# Patient Record
Sex: MALE | Race: WHITE
[De-identification: names, ages, dates, MRNs, and addresses within clinical notes are randomized per-mention and may not be internally consistent; named-entity substitution may affect disease eponyms.]

---

## 2021-09-03 ENCOUNTER — HOSPITAL ENCOUNTER (INPATIENT)
Dept: HOSPITAL 47 - EC | Age: 61
LOS: 6 days | Discharge: HOME HEALTH | DRG: 377 | End: 2021-09-09
Attending: HOSPITALIST | Admitting: HOSPITALIST
Payer: MEDICARE

## 2021-09-03 DIAGNOSIS — Z83.3: ICD-10-CM

## 2021-09-03 DIAGNOSIS — K31.819: ICD-10-CM

## 2021-09-03 DIAGNOSIS — Z86.14: ICD-10-CM

## 2021-09-03 DIAGNOSIS — J18.9: ICD-10-CM

## 2021-09-03 DIAGNOSIS — F17.210: ICD-10-CM

## 2021-09-03 DIAGNOSIS — I50.32: ICD-10-CM

## 2021-09-03 DIAGNOSIS — K22.10: ICD-10-CM

## 2021-09-03 DIAGNOSIS — I87.8: ICD-10-CM

## 2021-09-03 DIAGNOSIS — Z82.49: ICD-10-CM

## 2021-09-03 DIAGNOSIS — E87.2: ICD-10-CM

## 2021-09-03 DIAGNOSIS — K44.9: ICD-10-CM

## 2021-09-03 DIAGNOSIS — Z79.51: ICD-10-CM

## 2021-09-03 DIAGNOSIS — K42.9: ICD-10-CM

## 2021-09-03 DIAGNOSIS — L03.115: ICD-10-CM

## 2021-09-03 DIAGNOSIS — J44.0: ICD-10-CM

## 2021-09-03 DIAGNOSIS — K57.31: Primary | ICD-10-CM

## 2021-09-03 DIAGNOSIS — Z80.9: ICD-10-CM

## 2021-09-03 DIAGNOSIS — M19.012: ICD-10-CM

## 2021-09-03 DIAGNOSIS — K21.9: ICD-10-CM

## 2021-09-03 DIAGNOSIS — E87.1: ICD-10-CM

## 2021-09-03 DIAGNOSIS — D62: ICD-10-CM

## 2021-09-03 DIAGNOSIS — K80.20: ICD-10-CM

## 2021-09-03 DIAGNOSIS — J44.1: ICD-10-CM

## 2021-09-03 DIAGNOSIS — K64.0: ICD-10-CM

## 2021-09-03 DIAGNOSIS — D12.5: ICD-10-CM

## 2021-09-03 DIAGNOSIS — G89.29: ICD-10-CM

## 2021-09-03 DIAGNOSIS — E11.9: ICD-10-CM

## 2021-09-03 DIAGNOSIS — J96.01: ICD-10-CM

## 2021-09-03 DIAGNOSIS — E66.01: ICD-10-CM

## 2021-09-03 DIAGNOSIS — R00.0: ICD-10-CM

## 2021-09-03 DIAGNOSIS — Z79.899: ICD-10-CM

## 2021-09-03 LAB
ALBUMIN SERPL-MCNC: 3.5 G/DL (ref 3.5–5)
ALP SERPL-CCNC: 92 U/L (ref 38–126)
ALT SERPL-CCNC: 17 U/L (ref 4–49)
ANION GAP SERPL CALC-SCNC: 9 MMOL/L
APTT BLD: 25.7 SEC (ref 22–30)
AST SERPL-CCNC: 23 U/L (ref 17–59)
BASOPHILS # BLD AUTO: 0.1 K/UL (ref 0–0.2)
BASOPHILS NFR BLD AUTO: 0 %
BUN SERPL-SCNC: 30 MG/DL (ref 9–20)
CALCIUM SPEC-MCNC: 8.3 MG/DL (ref 8.4–10.2)
CHLORIDE SERPL-SCNC: 87 MMOL/L (ref 98–107)
CO2 SERPL-SCNC: 33 MMOL/L (ref 22–30)
EOSINOPHIL # BLD AUTO: 0 K/UL (ref 0–0.7)
EOSINOPHIL NFR BLD AUTO: 0 %
ERYTHROCYTE [DISTWIDTH] IN BLOOD BY AUTOMATED COUNT: 4.65 M/UL (ref 4.3–5.9)
ERYTHROCYTE [DISTWIDTH] IN BLOOD: 14.2 % (ref 11.5–15.5)
GLUCOSE SERPL-MCNC: 262 MG/DL (ref 74–99)
HCT VFR BLD AUTO: 43.9 % (ref 39–53)
HGB BLD-MCNC: 14.5 GM/DL (ref 13–17.5)
INR PPP: 1 (ref ?–1.2)
LIPASE SERPL-CCNC: 79 U/L (ref 23–300)
LYMPHOCYTES # SPEC AUTO: 1 K/UL (ref 1–4.8)
LYMPHOCYTES NFR SPEC AUTO: 8 %
MAGNESIUM SPEC-SCNC: 1.4 MG/DL (ref 1.6–2.3)
MCH RBC QN AUTO: 31.3 PG (ref 25–35)
MCHC RBC AUTO-ENTMCNC: 33.1 G/DL (ref 31–37)
MCV RBC AUTO: 94.3 FL (ref 80–100)
MONOCYTES # BLD AUTO: 0.5 K/UL (ref 0–1)
MONOCYTES NFR BLD AUTO: 4 %
NEUTROPHILS # BLD AUTO: 10.8 K/UL (ref 1.3–7.7)
NEUTROPHILS NFR BLD AUTO: 86 %
PLATELET # BLD AUTO: 459 K/UL (ref 150–450)
POTASSIUM SERPL-SCNC: 4.1 MMOL/L (ref 3.5–5.1)
PROT SERPL-MCNC: 7.2 G/DL (ref 6.3–8.2)
PT BLD: 10.7 SEC (ref 9–12)
SODIUM SERPL-SCNC: 129 MMOL/L (ref 137–145)
WBC # BLD AUTO: 12.6 K/UL (ref 3.8–10.6)

## 2021-09-03 PROCEDURE — 84484 ASSAY OF TROPONIN QUANT: CPT

## 2021-09-03 PROCEDURE — 93005 ELECTROCARDIOGRAM TRACING: CPT

## 2021-09-03 PROCEDURE — 74177 CT ABD & PELVIS W/CONTRAST: CPT

## 2021-09-03 PROCEDURE — 85025 COMPLETE CBC W/AUTO DIFF WBC: CPT

## 2021-09-03 PROCEDURE — 83690 ASSAY OF LIPASE: CPT

## 2021-09-03 PROCEDURE — 99285 EMERGENCY DEPT VISIT HI MDM: CPT

## 2021-09-03 PROCEDURE — 71046 X-RAY EXAM CHEST 2 VIEWS: CPT

## 2021-09-03 PROCEDURE — 88305 TISSUE EXAM BY PATHOLOGIST: CPT

## 2021-09-03 PROCEDURE — 87040 BLOOD CULTURE FOR BACTERIA: CPT

## 2021-09-03 PROCEDURE — 85730 THROMBOPLASTIN TIME PARTIAL: CPT

## 2021-09-03 PROCEDURE — 80053 COMPREHEN METABOLIC PANEL: CPT

## 2021-09-03 PROCEDURE — 83880 ASSAY OF NATRIURETIC PEPTIDE: CPT

## 2021-09-03 PROCEDURE — 45385 COLONOSCOPY W/LESION REMOVAL: CPT

## 2021-09-03 PROCEDURE — 94760 N-INVAS EAR/PLS OXIMETRY 1: CPT

## 2021-09-03 PROCEDURE — 85027 COMPLETE CBC AUTOMATED: CPT

## 2021-09-03 PROCEDURE — 43235 EGD DIAGNOSTIC BRUSH WASH: CPT

## 2021-09-03 PROCEDURE — 94640 AIRWAY INHALATION TREATMENT: CPT

## 2021-09-03 PROCEDURE — 80048 BASIC METABOLIC PNL TOTAL CA: CPT

## 2021-09-03 PROCEDURE — 83605 ASSAY OF LACTIC ACID: CPT

## 2021-09-03 PROCEDURE — 85610 PROTHROMBIN TIME: CPT

## 2021-09-03 PROCEDURE — 36415 COLL VENOUS BLD VENIPUNCTURE: CPT

## 2021-09-03 PROCEDURE — 83735 ASSAY OF MAGNESIUM: CPT

## 2021-09-03 PROCEDURE — 71045 X-RAY EXAM CHEST 1 VIEW: CPT

## 2021-09-03 PROCEDURE — 83036 HEMOGLOBIN GLYCOSYLATED A1C: CPT

## 2021-09-03 PROCEDURE — 82272 OCCULT BLD FECES 1-3 TESTS: CPT

## 2021-09-03 PROCEDURE — 99213 OFFICE O/P EST LOW 20 MIN: CPT

## 2021-09-03 RX ADMIN — MAGNESIUM SULFATE IN DEXTROSE SCH MLS/HR: 10 INJECTION, SOLUTION INTRAVENOUS at 23:43

## 2021-09-03 RX ADMIN — MAGNESIUM SULFATE IN DEXTROSE SCH MLS/HR: 10 INJECTION, SOLUTION INTRAVENOUS at 21:41

## 2021-09-03 NOTE — XR
EXAMINATION TYPE: XR chest 2V

 

DATE OF EXAM: 9/3/2021

 

COMPARISON: 11/5/2013

 

HISTORY: Short of breath

 

TECHNIQUE:

 

FINDINGS: There is a 3 cm patch of infiltrate in the right lower lobe. There is also some infiltrate 
in the lateral left upper lobe. Heart and mediastinum are normal. There are no hilar masses. There is
 no pleural effusion. Bony thorax is intact. There are chest leads.

 

IMPRESSION: There is evidence for some mild pneumonia right lower lobe and left upper lobe that is a 
change compared to old exam. Normal heart. No heart failure.

## 2021-09-03 NOTE — ED
GI Bleed HPI





- General


Chief complaint: GI Bleed


Stated complaint: GI Bleed


Time Seen by Provider: 09/03/21 18:05


Source: patient, EMS


Mode of arrival: EMS


Limitations: no limitations





- History of Present Illness


Initial comments: 





60-year-old male past medical history of heart failure, COPD, diabetes presents 

emergency department with reported bright red blood per rectum.  Patient states 

that since 11 AM he has had 4 episodes of bright red blood.  Denies previous 

history of GI bleeds.  No peptic ulcer disease.  Patient does not drink.  States

that he has been been taking Naprosyn twice daily for left shoulder pain.  

Denies alcohol use.  No fevers or chills.  Denies any abdominal pain.  No rectal

pain.  Has had an endoscopy however it was greater than 10 years ago.  No fevers

or chills.  No nausea or vomiting.  Upon evaluation of the patient in his room 

he does have notable shortness of breath.  Patient does not appear to be 

bothered by his work of breathing however he appears significantly in distress 

after moving around to complete the rectal exam. Patient states that he has a 

history of COPD and asthma.  He also has a history of heart failure.  He was 

recently placed on a new diuretic.  He denies chest pain.  No fevers, chills or 

cough.  Denies any worsening lower extremity edema. Came to the hospital today 

for wound care evaluation and felt as if he was doing well ambulating without 

shortness of breath. No alleviating, precipitating or modifying factors





- Related Data


                                Home Medications











 Medication  Instructions  Recorded  Confirmed


 


Furosemide [Lasix] 40 mg PO BID 07/19/16 09/03/21


 


Sulfamethox-Tmp 800-160Mg [Bactrim 1 tab PO Q12HR 07/19/16 09/03/21





-160 mg]   


 


Acetaminophen Tab [Tylenol Tab] 1,000 mg PO Q6HR PRN 09/03/21 09/03/21


 


Albuterol Sulfate [Ventolin HFA] 1 - 2 puff INHALATION RT-Q6H PRN 09/03/21 09/03/21


 


Naproxen 500 mg PO BID PRN 09/03/21 09/03/21


 


Oxymetazoline HCl [Afrin 0.05%] 1 spray EA NOSTRIL DAILY PRN 09/03/21 09/03/21


 


metOLazone [Zaroxolyn] 5 mg PO DAILY 09/03/21 09/03/21











                                    Allergies











Allergy/AdvReac Type Severity Reaction Status Date / Time


 


No Known Allergies Allergy   Verified 09/03/21 18:29














Review of Systems


ROS Statement: 


Those systems with pertinent positive or pertinent negative responses have been 

documented in the HPI.





ROS Other: All systems not noted in ROS Statement are negative.





Past Medical History


Past Medical History: Heart Failure, COPD, Diabetes Mellitus


History of Any Multi-Drug Resistant Organisms: MRSA


Date of last positivie culture/infection: 6/23/16


MDRO Source:: Left Foot


Past Surgical History: Hernia Repair, Orthopedic Surgery, Tonsillectomy


Additional Past Surgical History / Comment(s): lt. 5th toe amp


Past Psychological History: No Psychological Hx Reported


Smoking Status: Current every day smoker


Past Alcohol Use History: None Reported


Past Drug Use History: None Reported





General Exam


Limitations: no limitations


General appearance: alert, in no apparent distress


Head exam: Present: atraumatic, normocephalic, normal inspection


Eye exam: Present: normal appearance, PERRL, EOMI.  Absent: scleral icterus, 

conjunctival injection, periorbital swelling


ENT exam: Present: normal exam, mucous membranes moist


Respiratory exam: Present: respiratory distress, wheezes, accessory muscle use, 

decreased breath sounds


Cardiovascular Exam: Present: normal rhythm, tachycardia


GI/Abdominal exam: Present: soft, normal bowel sounds.  Absent: distended, 

tenderness, guarding, rebound, rigid


Rectal exam: Present: bloody stool.  Absent: hemorrhoids, mass, tenderness, 

prostate tenderness


Extremities exam: Present: other (b/l le erythema, brawny edema with chronic 

venous stasis)


Neurological exam: Present: alert, oriented X3, CN II-XII intact


Psychiatric exam: Present: normal affect, normal mood





Course


                                   Vital Signs











  09/03/21 09/03/21 09/03/21





  18:03 19:06 19:30


 


Temperature 98.2 F  


 


Pulse Rate 114 H 110 H 112 H


 


Respiratory 19 18 





Rate   


 


Blood Pressure 107/77 112/80 


 


O2 Sat by Pulse 98 100 





Oximetry   














  09/03/21 09/03/21 09/04/21





  19:39 21:15 01:00


 


Temperature   


 


Pulse Rate 114 H 112 H 98


 


Respiratory  18 18





Rate   


 


Blood Pressure  96/72 114/74


 


O2 Sat by Pulse  99 100





Oximetry   














  09/04/21 09/04/21 09/04/21





  02:47 07:43 07:59


 


Temperature   


 


Pulse Rate 96 100 98


 


Respiratory 18  





Rate   


 


Blood Pressure 114/77  


 


O2 Sat by Pulse 100  





Oximetry   














  09/04/21 09/04/21 09/04/21





  10:24 11:39 11:49


 


Temperature 99.0 F  


 


Pulse Rate 96 86 92


 


Respiratory 18  





Rate   


 


Blood Pressure 136/86  


 


O2 Sat by Pulse 96  





Oximetry   














Medical Decision Making





- Medical Decision Making





Upon arrival patient was placed into room 1.  Rectal exam is performed and 

demonstrates small streaks of bright red blood.  Patient hemodynamically stable.

  He is placed on 6 L of oxygen due to his increased respiratory status.  

Laboratory studies were conducted.  Hemoglobin is 14.5.  Lactic acid 2.4.  

Sodium 129.  Creatinine 1.2.  Occult is positive.  Chest x-ray does demonstrate 

mild pneumonia right lower lobe and left upper lobe.  Blood cultures obtained.  

Patient given a dose of Levaquin and Vanco.  Patient also given 80 mg Protonix. 

 Recommended admission in order to trend his troponins.  Spoke with Dr. Yarbrough 

who refused to consult on the patient. He states "I don't do GI bleeds".  

Because of this I did call and speak with Dr. Saldaña who agreed to consult on

 the patient. He will be admitted to medicine. She would like any DVT 

prophylaxis held.  Repeat CBC ordered for midnight.  DuoNeb treatments are 

ordered for every 4 hours.  Patient remained in hemodynamically stable condition

 awaiting a bed on the floor





- Lab Data


Result diagrams: 


                                 09/04/21 13:05





                                 09/04/21 02:52


                                   Lab Results











  09/03/21 09/03/21 09/03/21 Range/Units





  19:08 19:08 19:08 


 


WBC  12.6 H    (3.8-10.6)  k/uL


 


RBC  4.65    (4.30-5.90)  m/uL


 


Hgb  14.5    (13.0-17.5)  gm/dL


 


Hct  43.9    (39.0-53.0)  %


 


MCV  94.3    (80.0-100.0)  fL


 


MCH  31.3    (25.0-35.0)  pg


 


MCHC  33.1    (31.0-37.0)  g/dL


 


RDW  14.2    (11.5-15.5)  %


 


Plt Count  459 H    (150-450)  k/uL


 


MPV  7.5    


 


Neutrophils %  86    %


 


Lymphocytes %  8    %


 


Monocytes %  4    %


 


Eosinophils %  0    %


 


Basophils %  0    %


 


Neutrophils #  10.8 H    (1.3-7.7)  k/uL


 


Lymphocytes #  1.0    (1.0-4.8)  k/uL


 


Monocytes #  0.5    (0-1.0)  k/uL


 


Eosinophils #  0.0    (0-0.7)  k/uL


 


Basophils #  0.1    (0-0.2)  k/uL


 


PT    10.7  (9.0-12.0)  sec


 


INR    1.0  (<1.2)  


 


APTT    25.7  (22.0-30.0)  sec


 


Sodium     (137-145)  mmol/L


 


Potassium     (3.5-5.1)  mmol/L


 


Chloride     ()  mmol/L


 


Carbon Dioxide     (22-30)  mmol/L


 


Anion Gap     mmol/L


 


BUN     (9-20)  mg/dL


 


Creatinine     (0.66-1.25)  mg/dL


 


Est GFR (CKD-EPI)AfAm     (>60 ml/min/1.73 sqM)  


 


Est GFR (CKD-EPI)NonAf     (>60 ml/min/1.73 sqM)  


 


Glucose     (74-99)  mg/dL


 


Lactic Ac Sepsis Rflx     


 


Plasma Lactic Acid Paul     (0.7-2.0)  mmol/L


 


Calcium     (8.4-10.2)  mg/dL


 


Magnesium     (1.6-2.3)  mg/dL


 


Total Bilirubin     (0.2-1.3)  mg/dL


 


AST     (17-59)  U/L


 


ALT     (4-49)  U/L


 


Alkaline Phosphatase     ()  U/L


 


Troponin I     (0.000-0.034)  ng/mL


 


NT-Pro-B Natriuret Pep     pg/mL


 


Total Protein     (6.3-8.2)  g/dL


 


Albumin     (3.5-5.0)  g/dL


 


Lipase     ()  U/L


 


Stool Occult Blood   Positive   (Negative)  














  09/03/21 09/03/21 09/03/21 Range/Units





  19:08 19:08 19:08 


 


WBC     (3.8-10.6)  k/uL


 


RBC     (4.30-5.90)  m/uL


 


Hgb     (13.0-17.5)  gm/dL


 


Hct     (39.0-53.0)  %


 


MCV     (80.0-100.0)  fL


 


MCH     (25.0-35.0)  pg


 


MCHC     (31.0-37.0)  g/dL


 


RDW     (11.5-15.5)  %


 


Plt Count     (150-450)  k/uL


 


MPV     


 


Neutrophils %     %


 


Lymphocytes %     %


 


Monocytes %     %


 


Eosinophils %     %


 


Basophils %     %


 


Neutrophils #     (1.3-7.7)  k/uL


 


Lymphocytes #     (1.0-4.8)  k/uL


 


Monocytes #     (0-1.0)  k/uL


 


Eosinophils #     (0-0.7)  k/uL


 


Basophils #     (0-0.2)  k/uL


 


PT     (9.0-12.0)  sec


 


INR     (<1.2)  


 


APTT     (22.0-30.0)  sec


 


Sodium  129 L    (137-145)  mmol/L


 


Potassium  4.1    (3.5-5.1)  mmol/L


 


Chloride  87 L    ()  mmol/L


 


Carbon Dioxide  33 H    (22-30)  mmol/L


 


Anion Gap  9    mmol/L


 


BUN  30 H    (9-20)  mg/dL


 


Creatinine  1.24    (0.66-1.25)  mg/dL


 


Est GFR (CKD-EPI)AfAm  72    (>60 ml/min/1.73 sqM)  


 


Est GFR (CKD-EPI)NonAf  62    (>60 ml/min/1.73 sqM)  


 


Glucose  262 H    (74-99)  mg/dL


 


Lactic Ac Sepsis Rflx     


 


Plasma Lactic Acid Paul   2.4 H*   (0.7-2.0)  mmol/L


 


Calcium  8.3 L    (8.4-10.2)  mg/dL


 


Magnesium  1.4 L    (1.6-2.3)  mg/dL


 


Total Bilirubin  0.7    (0.2-1.3)  mg/dL


 


AST  23    (17-59)  U/L


 


ALT  17    (4-49)  U/L


 


Alkaline Phosphatase  92    ()  U/L


 


Troponin I    <0.012  (0.000-0.034)  ng/mL


 


NT-Pro-B Natriuret Pep     pg/mL


 


Total Protein  7.2    (6.3-8.2)  g/dL


 


Albumin  3.5    (3.5-5.0)  g/dL


 


Lipase  79    ()  U/L


 


Stool Occult Blood     (Negative)  














  09/03/21 09/03/21 Range/Units





  19:08 19:36 


 


WBC    (3.8-10.6)  k/uL


 


RBC    (4.30-5.90)  m/uL


 


Hgb    (13.0-17.5)  gm/dL


 


Hct    (39.0-53.0)  %


 


MCV    (80.0-100.0)  fL


 


MCH    (25.0-35.0)  pg


 


MCHC    (31.0-37.0)  g/dL


 


RDW    (11.5-15.5)  %


 


Plt Count    (150-450)  k/uL


 


MPV    


 


Neutrophils %    %


 


Lymphocytes %    %


 


Monocytes %    %


 


Eosinophils %    %


 


Basophils %    %


 


Neutrophils #    (1.3-7.7)  k/uL


 


Lymphocytes #    (1.0-4.8)  k/uL


 


Monocytes #    (0-1.0)  k/uL


 


Eosinophils #    (0-0.7)  k/uL


 


Basophils #    (0-0.2)  k/uL


 


PT    (9.0-12.0)  sec


 


INR    (<1.2)  


 


APTT    (22.0-30.0)  sec


 


Sodium    (137-145)  mmol/L


 


Potassium    (3.5-5.1)  mmol/L


 


Chloride    ()  mmol/L


 


Carbon Dioxide    (22-30)  mmol/L


 


Anion Gap    mmol/L


 


BUN    (9-20)  mg/dL


 


Creatinine    (0.66-1.25)  mg/dL


 


Est GFR (CKD-EPI)AfAm    (>60 ml/min/1.73 sqM)  


 


Est GFR (CKD-EPI)NonAf    (>60 ml/min/1.73 sqM)  


 


Glucose    (74-99)  mg/dL


 


Lactic Ac Sepsis Rflx   Y  


 


Plasma Lactic Acid Paul    (0.7-2.0)  mmol/L


 


Calcium    (8.4-10.2)  mg/dL


 


Magnesium    (1.6-2.3)  mg/dL


 


Total Bilirubin    (0.2-1.3)  mg/dL


 


AST    (17-59)  U/L


 


ALT    (4-49)  U/L


 


Alkaline Phosphatase    ()  U/L


 


Troponin I    (0.000-0.034)  ng/mL


 


NT-Pro-B Natriuret Pep  43   pg/mL


 


Total Protein    (6.3-8.2)  g/dL


 


Albumin    (3.5-5.0)  g/dL


 


Lipase    ()  U/L


 


Stool Occult Blood    (Negative)  














- EKG Data


EKG Comments: 





EKG demonstrates a sinus tachycardia with a ventricular rate of 112. NC interval

 152.  .  QTC of 469.  No acute ST segment elevations or depressions 

concerning for ischemic changes





Disposition


Clinical Impression: 


 Hematochezia, CAP (community acquired pneumonia)





Disposition: ADMITTED AS IP TO THIS HOSP


Condition: Stable


Is patient prescribed a controlled substance at d/c from ED?: No


Decision to Admit Reason: Admit from EC


Decision Date: 09/03/21


Decision Time: 20:59

## 2021-09-04 LAB
ANION GAP SERPL CALC-SCNC: 6 MMOL/L
BASOPHILS # BLD AUTO: 0.1 K/UL (ref 0–0.2)
BASOPHILS NFR BLD AUTO: 1 %
BUN SERPL-SCNC: 32 MG/DL (ref 9–20)
CALCIUM SPEC-MCNC: 8.4 MG/DL (ref 8.4–10.2)
CHLORIDE SERPL-SCNC: 88 MMOL/L (ref 98–107)
CO2 SERPL-SCNC: 35 MMOL/L (ref 22–30)
EOSINOPHIL # BLD AUTO: 0 K/UL (ref 0–0.7)
EOSINOPHIL NFR BLD AUTO: 0 %
ERYTHROCYTE [DISTWIDTH] IN BLOOD BY AUTOMATED COUNT: 3.75 M/UL (ref 4.3–5.9)
ERYTHROCYTE [DISTWIDTH] IN BLOOD BY AUTOMATED COUNT: 4.06 M/UL (ref 4.3–5.9)
ERYTHROCYTE [DISTWIDTH] IN BLOOD BY AUTOMATED COUNT: 4.34 M/UL (ref 4.3–5.9)
ERYTHROCYTE [DISTWIDTH] IN BLOOD: 14.2 % (ref 11.5–15.5)
ERYTHROCYTE [DISTWIDTH] IN BLOOD: 14.3 % (ref 11.5–15.5)
ERYTHROCYTE [DISTWIDTH] IN BLOOD: 14.3 % (ref 11.5–15.5)
GLUCOSE SERPL-MCNC: 194 MG/DL (ref 74–99)
HCT VFR BLD AUTO: 35.7 % (ref 39–53)
HCT VFR BLD AUTO: 38.6 % (ref 39–53)
HCT VFR BLD AUTO: 41.1 % (ref 39–53)
HGB BLD-MCNC: 12 GM/DL (ref 13–17.5)
HGB BLD-MCNC: 12.8 GM/DL (ref 13–17.5)
HGB BLD-MCNC: 13.5 GM/DL (ref 13–17.5)
LYMPHOCYTES # SPEC AUTO: 1.7 K/UL (ref 1–4.8)
LYMPHOCYTES NFR SPEC AUTO: 16 %
MCH RBC QN AUTO: 31 PG (ref 25–35)
MCH RBC QN AUTO: 31.4 PG (ref 25–35)
MCH RBC QN AUTO: 31.9 PG (ref 25–35)
MCHC RBC AUTO-ENTMCNC: 32.7 G/DL (ref 31–37)
MCHC RBC AUTO-ENTMCNC: 33.1 G/DL (ref 31–37)
MCHC RBC AUTO-ENTMCNC: 33.5 G/DL (ref 31–37)
MCV RBC AUTO: 94.8 FL (ref 80–100)
MCV RBC AUTO: 95 FL (ref 80–100)
MCV RBC AUTO: 95.3 FL (ref 80–100)
MONOCYTES # BLD AUTO: 0.5 K/UL (ref 0–1)
MONOCYTES NFR BLD AUTO: 5 %
NEUTROPHILS # BLD AUTO: 8.3 K/UL (ref 1.3–7.7)
NEUTROPHILS NFR BLD AUTO: 76 %
PLATELET # BLD AUTO: 357 K/UL (ref 150–450)
PLATELET # BLD AUTO: 383 K/UL (ref 150–450)
PLATELET # BLD AUTO: 411 K/UL (ref 150–450)
POTASSIUM SERPL-SCNC: 4.5 MMOL/L (ref 3.5–5.1)
SODIUM SERPL-SCNC: 129 MMOL/L (ref 137–145)
WBC # BLD AUTO: 10.9 K/UL (ref 3.8–10.6)
WBC # BLD AUTO: 10.9 K/UL (ref 3.8–10.6)
WBC # BLD AUTO: 8.5 K/UL (ref 3.8–10.6)

## 2021-09-04 RX ADMIN — IPRATROPIUM BROMIDE AND ALBUTEROL SULFATE SCH: .5; 3 SOLUTION RESPIRATORY (INHALATION) at 19:21

## 2021-09-04 RX ADMIN — IPRATROPIUM BROMIDE AND ALBUTEROL SULFATE PRN ML: .5; 3 SOLUTION RESPIRATORY (INHALATION) at 11:39

## 2021-09-04 RX ADMIN — PANTOPRAZOLE SODIUM SCH MG: 40 INJECTION, POWDER, FOR SOLUTION INTRAVENOUS at 08:48

## 2021-09-04 RX ADMIN — IPRATROPIUM BROMIDE AND ALBUTEROL SULFATE PRN ML: .5; 3 SOLUTION RESPIRATORY (INHALATION) at 07:43

## 2021-09-04 RX ADMIN — CEFAZOLIN SCH MLS/HR: 330 INJECTION, POWDER, FOR SOLUTION INTRAMUSCULAR; INTRAVENOUS at 15:07

## 2021-09-04 RX ADMIN — CEFAZOLIN SCH MLS/HR: 330 INJECTION, POWDER, FOR SOLUTION INTRAMUSCULAR; INTRAVENOUS at 02:42

## 2021-09-04 RX ADMIN — IPRATROPIUM BROMIDE AND ALBUTEROL SULFATE SCH ML: .5; 3 SOLUTION RESPIRATORY (INHALATION) at 15:17

## 2021-09-04 RX ADMIN — AZITHROMYCIN SCH MG: 500 TABLET, FILM COATED ORAL at 15:07

## 2021-09-04 RX ADMIN — PANTOPRAZOLE SODIUM SCH MG: 40 INJECTION, POWDER, FOR SOLUTION INTRAVENOUS at 22:01

## 2021-09-04 RX ADMIN — FORMOTEROL FUMARATE DIHYDRATE SCH: 20 SOLUTION RESPIRATORY (INHALATION) at 19:21

## 2021-09-04 RX ADMIN — BUDESONIDE SCH: 1 SUSPENSION RESPIRATORY (INHALATION) at 19:21

## 2021-09-04 NOTE — P.CNPUL
History of Present Illness


Consult date: 09/04/21


Requesting physician: Yumiko Aden


Reason for consult: dyspnea, abnormal CXR/CT


Chief complaint: Rectal bleeding, shortness of breath


History of present illness: 





This is a 60-year-old male patient who follows with Dr. Arora as his primary

care provider.  He has history of diabetes mellitus, diastolic congestive heart 

failure, chronic obstructive pulmonary disease, chronic and ongoing tobacco 

dependence.  He presented to the emergency room last evening with reports of 

bright red blood per rectum.  He had approximate 4 episode yesterday.  No 

previous history of GI bleed.  Denies excessive alcohol use.  His been taking 

Naprosyn twice daily for left shoulder pain.  Chest x-ray revealed some mild 

infiltrates in the right lower lobe and left upper lobe.  We're consulted for 

the same.  He is seen in the emergency room.  Currently sitting up on the 

stretcher.  Awake and alert in no acute distress.  He is requiring 5 L high flow

nasal cannula to maintain O2 saturations in the 90s.  He does have home oxygen. 

He has a nebulizer with albuterol treatments.  He had not been seen by pulmon

ologist in the past.  He has been smoking for approximately 42 years.  White 

count 8.5.  Hemoglobin 12.0.  Sodium 129.  Potassium 4.5.  Bicarb 35.  

Creatinine 1.15.  Initial lactic 2.9.  Currently 1.5.  ProBNP 43.  Stool for 

occult blood positive.  He's been initiated on vancomycin, DuoNeb inhalations, 

Protonix.  0.9 normal saline at 75 ML's per hour.





Review of Systems





REVIEW OF SYSTEMS:


CONSTITUTIONAL: Denies any recent significant weight loss or weight gain.


EYES: Denies change in vision.


EARS, NOSE, MOUTH, THROAT: Denies headaches, denies sore throat.


CARDIOVASCULAR: Denies chest pain, palpitations or syncopal episodes.


RESPIRATORY: Positive for shortness of breath, cough, congestion no hemoptysis.


GASTROINTESTINAL: Positive for bright red blood per rectum


GENITOURINARY: Denies hematuria, denies infections.


MUSKULOSKELETAL: Denies pain, denies swelling.


INTEGUMENTARY: Denies rash, denies eczema.


NEUROLOGICAL: Denies recent memory loss, no recent seizure activity. 


PSYCHIATRIC: Denies anxiety, denies depression.


HEMATOLOGIC/LYMPHATIC: Denies anemia, denies enlarged lymph nodes.








Past Medical History


Past Medical History: Heart Failure, COPD, Diabetes Mellitus


History of Any Multi-Drug Resistant Organisms: MRSA


Date of last positivie culture/infection: 6/23/16


MDRO Source:: Left Foot


Past Surgical History: Hernia Repair, Orthopedic Surgery, Tonsillectomy


Additional Past Surgical History / Comment(s): lt. 5th toe amp


Past Psychological History: No Psychological Hx Reported


Smoking Status: Current every day smoker


Past Alcohol Use History: None Reported


Past Drug Use History: None Reported





Medications and Allergies


                                Home Medications











 Medication  Instructions  Recorded  Confirmed  Type


 


Furosemide [Lasix] 40 mg PO BID 07/19/16 09/03/21 History


 


Sulfamethox-Tmp 800-160Mg [Bactrim 1 tab PO Q12HR 07/19/16 09/03/21 History





-160 mg]    


 


Acetaminophen Tab [Tylenol Tab] 1,000 mg PO Q6HR PRN 09/03/21 09/03/21 History


 


Albuterol Sulfate [Ventolin HFA] 1 - 2 puff INHALATION RT-Q6H PRN 09/03/21 09/03/21 History


 


Naproxen 500 mg PO BID PRN 09/03/21 09/03/21 History


 


Oxymetazoline HCl [Afrin 0.05%] 1 spray EA NOSTRIL DAILY PRN 09/03/21 09/03/21 

History


 


metOLazone [Zaroxolyn] 5 mg PO DAILY 09/03/21 09/03/21 History








                                    Allergies











Allergy/AdvReac Type Severity Reaction Status Date / Time


 


No Known Allergies Allergy   Verified 09/03/21 18:29














Physical Exam


Vitals: 


                                   Vital Signs











  Temp Pulse Resp BP Pulse Ox


 


 09/04/21 11:49   92   


 


 09/04/21 11:39   86   


 


 09/04/21 10:24  99.0 F  96  18  136/86  96


 


 09/04/21 07:59   98   


 


 09/04/21 07:43   100   


 


 09/04/21 02:47   96  18  114/77  100


 


 09/04/21 01:00   98  18  114/74  100


 


 09/03/21 21:15   112 H  18  96/72  99


 


 09/03/21 19:39   114 H   


 


 09/03/21 19:30   112 H   


 


 09/03/21 19:06   110 H  18  112/80  100


 


 09/03/21 18:03  98.2 F  114 H  19  107/77  98








                                Intake and Output











 09/03/21 09/04/21 09/04/21





 22:59 06:59 14:59


 


Output Total 150  


 


Balance -150  


 


Output:   


 


  Stool 150  


 


Other:   


 


  # Bowel Movements 1  


 


  Weight 145.15 kg  














GENERAL EXAM: Alert, pleasant 60-year-old gentleman, on 5 L nasal cannula, 

fairly comfortable in no apparent distress.


HEAD: Normocephalic.


EYES: Normal reaction of pupils, equal size.


NOSE: Clear with pink turbinates.


THROAT: No erythema or exudates.


NECK: No masses, no JVD.


CHEST: No chest wall deformity.


LUNGS: Equal air entry with scattered rhonchi, end expiratory wheeze, 

diminished.


CVS: S1 and S2 normal with no audible murmur, regular rhythm.


ABDOMEN: No hepatosplenomegaly, normal bowel sounds, no guarding or rigidity.


SPINE: No scoliosis or deformity


SKIN: No rashes


CENTRAL NERVOUS SYSTEM: No focal deficits, tone is normal in all 4 extremities.


EXTREMITIES: There is no peripheral edema.  No clubbing, no cyanosis.  

Peripheral pulses are intact.





Results





- Laboratory Findings


CBC and BMP: 


                                 09/04/21 13:05





                                 09/04/21 02:52


PT/INR, D-dimer











PT  10.7 sec (9.0-12.0)   09/03/21  19:08    


 


INR  1.0  (<1.2)   09/03/21  19:08    








Abnormal lab findings: 


                                  Abnormal Labs











  09/03/21 09/03/21 09/03/21





  19:08 19:08 19:08


 


WBC  12.6 H  


 


RBC   


 


Hgb   


 


Hct   


 


Plt Count  459 H  


 


Neutrophils #  10.8 H  


 


Sodium   129 L 


 


Chloride   87 L 


 


Carbon Dioxide   33 H 


 


BUN   30 H 


 


Glucose   262 H 


 


Plasma Lactic Acid Paul    2.4 H*


 


Calcium   8.3 L 


 


Magnesium   1.4 L 














  09/03/21 09/04/21 09/04/21





  22:45 02:46 02:52


 


WBC   10.9 H 


 


RBC   4.06 L 


 


Hgb   12.8 L 


 


Hct   38.6 L 


 


Plt Count   


 


Neutrophils #   


 


Sodium    129 L


 


Chloride    88 L


 


Carbon Dioxide    35 H


 


BUN    32 H


 


Glucose    194 H


 


Plasma Lactic Acid Paul  2.9 H*  


 


Calcium   


 


Magnesium   














  09/04/21 09/04/21





  02:52 13:05


 


WBC  10.9 H 


 


RBC   3.75 L


 


Hgb   12.0 L


 


Hct   35.7 L


 


Plt Count  


 


Neutrophils #  8.3 H 


 


Sodium  


 


Chloride  


 


Carbon Dioxide  


 


BUN  


 


Glucose  


 


Plasma Lactic Acid Paul  


 


Calcium  


 


Magnesium  














- Diagnostic Findings


Chest x-ray: image reviewed





Assessment and Plan


Assessment: 





1 Acute lower GI bleed with bright red blood per rectum





2 Acute hypoxemic respiratory failure secondary to an acute community-acquired 

pneumonia





3 Acute exacerbation of chronic obstructive pulmonary disease secondary to above





4 Chronic and ongoing tobacco dependence of greater than 40 years





5 History of diastolic congestive heart failure, maintained on Lasix and 

Zaroxolyn in the outpatient setting





6 Chronic left shoulder pain taking Naprosyn twice daily





7 Diabetes mellitus





Plan:





The patient was seen and evaluated by Dr. Marin


Chest x-ray and labs reviewed


Discontinue vancomycin


Initiate ceftriaxone and azithromycin


Initiate DuoNeb inhalations, Pulmicort and Perforomist inhalations, IV Solu-

Medrol


Educated regarding the importance of complete smoking cessation


Continue to need to monitor hemoglobin and rectal bleeding


Remains on IV Protonix


Titrate the FiO2 as tolerated


We will continue to follow and make further recommendations based on his 

clinical status





I, the cosigning physician, performed a history & physical examination of the 

patient. Lungs sounds with bilateral scattered rhonchi, end expiratory wheeze, 

diminished.  Maintaining good O2 saturations in the 90s on 5 L/m per nasal 

cannula.  I discussed the assessment and plan of care with my nurse 

practitioner, Sandra Bustillos. I attest to the above infiltration as dictated by her.


Time with Patient: Greater than 30

## 2021-09-04 NOTE — CT
EXAMINATION TYPE: CT abdomen pelvis w con

 

DATE OF EXAM: 9/4/2021

 

COMPARISON: None

 

HISTORY: GI Bleed

 

CT DLP: 2731.8 mGycm

Automated exposure control for dose reduction was used.

 

CONTRAST: 

Performed with IV Contrast, patient injected with 80 mL of Isovue 300.

 

 

Images obtained from the diaphragm to the floor the pelvis with oral and IV contrast.

 

Heart is normal. There is no pericardial effusion. Lung bases are clear. There is no pleural effusion
. There is minimal subsegmental atelectasis right lung base. Liver spleen stomach pancreas gallbladde
r appear intact. There are small calcified gallstones. Bile ducts are not dilated.

 

There is no adrenal mass. Kidneys show satisfactory contrast opacification. There is no hydronephrosi
s. Delayed images show very little contrast in the renal collecting systems. There is no retroperiton
eal adenopathy. Ureters are not dilated. Bladder distends smoothly. There is no inguinal hernia. Ther
e is fat containing umbilical hernia that measures 5.6 cm.

 

There is no mesenteric edema. There is no ascites or free air. There is no sign of a bowel obstructio
n. There is oral contrast material extending to the rectum. There is no sign of intestinal wall thick
ening. There is very short appendix which is not dilated. There is atherosclerotic vascular calcifica
tion.

 

Lumbar vertebra have normal alignment. There is no compression fracture. Bony pelvis is intact. Hip j
oints are intact. Sacroiliac joints are intact. There is 3.5 cm exostosis on the lateral right ilium.
 This could be osteochondroma.

 

IMPRESSION:

No evidence of renal stone or obstruction. Decreased contrast seen on the delayed images that could r
elate to some degree of renal failure.

 

No evidence of bowel obstruction. I do not see a cause for GI bleeding.

 

Umbilical hernia. Cholelithiasis.

## 2021-09-04 NOTE — P.HPIM
History of Present Illness


H&P Date: 09/04/21


Chief Complaint: Rectal bleed





60-year-old male patient who follows with Dr. Arora as his primary care 

provider.  He has history of diabetes mellitus, diastolic congestive heart 

failure, chronic obstructive pulmonary disease, chronic and ongoing tobacco 

dependence.  He presented to the emergency room last evening with reports of 

bright red blood per rectum.  He had approximate 4 episode yesterday.  No 

previous history of GI bleed.  Denies excessive alcohol use.  His been taking 

Naprosyn twice daily for left shoulder pain.  Chest x-ray revealed some mild 

infiltrates in the right lower lobe and left upper lobe.  We're consulted for 

the same.  He is seen in the emergency room.  Currently sitting up on the 

stretcher.  Awake and alert in no acute distress.  He is requiring 5 L high flow

nasal cannula to maintain O2 saturations in the 90s.  He does have home oxygen. 

He has a nebulizer with albuterol treatments.  He had not been seen by 

pulmonologist in the past.  He has been smoking for approximately 42 years.  

White count 8.5.  Hemoglobin 12.0.  Sodium 129.  Potassium 4.5.  Bicarb 35.  

Creatinine 1.15.  Initial lactic 2.9.  Currently 1.5.  ProBNP 43.  Stool for 

occult blood positive. 





Review of Systems











REVIEW OF SYSTEMS: 


CONSTITUTIONAL: No fever, no malaise, no fatigue. 


HEENT: No recent visual problems or hearing problems. Denied any sore throat. 


CARDIOVASCULAR: No chest pain, orthopnea, PND, no palpitations, no syncope. 


PULMONARY: No shortness of breath, no cough, no hemoptysis. 


GASTROINTESTINAL: No diarrhea, no nausea, no vomiting, no abdominal pain. 


NEUROLOGICAL: No headaches, no weakness, no numbness. 


HEMATOLOGICAL: Denies any bleeding or petechiae. 


GENITOURINARY: Denies any burning micturition, frequency, or urgency. 


MUSCULOSKELETAL/RHEUMATOLOGICAL: Denies any joint pain, swelling, or any muscle 

pain. 


ENDOCRINE: Denies any polyuria or polydipsia. 





The rest of the 14-point review of systems is negative.





Past Medical History


Past Medical History: Heart Failure, COPD, Diabetes Mellitus


History of Any Multi-Drug Resistant Organisms: MRSA


Date of last positivie culture/infection: 6/23/16


MDRO Source:: Left Foot


Past Surgical History: Hernia Repair, Orthopedic Surgery, Tonsillectomy


Additional Past Surgical History / Comment(s): lt. 5th toe amp


Past Psychological History: No Psychological Hx Reported


Smoking Status: Current every day smoker


Past Alcohol Use History: None Reported


Past Drug Use History: None Reported





Medications and Allergies


                                Home Medications











 Medication  Instructions  Recorded  Confirmed  Type


 


Furosemide [Lasix] 40 mg PO BID 07/19/16 09/03/21 History


 


Sulfamethox-Tmp 800-160Mg [Bactrim 1 tab PO Q12HR 07/19/16 09/03/21 History





-160 mg]    


 


Acetaminophen Tab [Tylenol Tab] 1,000 mg PO Q6HR PRN 09/03/21 09/03/21 History


 


Albuterol Sulfate [Ventolin HFA] 1 - 2 puff INHALATION RT-Q6H PRN 09/03/21 09/03/21 History


 


Naproxen 500 mg PO BID PRN 09/03/21 09/03/21 History


 


Oxymetazoline HCl [Afrin 0.05%] 1 spray EA NOSTRIL DAILY PRN 09/03/21 09/03/21 

History


 


metOLazone [Zaroxolyn] 5 mg PO DAILY 09/03/21 09/03/21 History








                                    Allergies











Allergy/AdvReac Type Severity Reaction Status Date / Time


 


No Known Allergies Allergy   Verified 09/03/21 18:29














Physical Exam


Vitals: 


                                   Vital Signs











  Temp Pulse Resp BP Pulse Ox


 


 09/04/21 11:49   92   


 


 09/04/21 11:39   86   


 


 09/04/21 10:24  99.0 F  96  18  136/86  96


 


 09/04/21 07:59   98   


 


 09/04/21 07:43   100   


 


 09/04/21 02:47   96  18  114/77  100


 


 09/04/21 01:00   98  18  114/74  100


 


 09/03/21 21:15   112 H  18  96/72  99


 


 09/03/21 19:39   114 H   


 


 09/03/21 19:30   112 H   


 


 09/03/21 19:06   110 H  18  112/80  100


 


 09/03/21 18:03  98.2 F  114 H  19  107/77  98








                                Intake and Output











 09/03/21 09/04/21 09/04/21





 22:59 06:59 14:59


 


Output Total 150  


 


Balance -150  


 


Output:   


 


  Stool 150  


 


Other:   


 


  # Bowel Movements 1  


 


  Weight 145.15 kg  














GENERAL EXAM: Alert, pleasant 60-year-old gentleman, on 5 L nasal cannula, fairl

y comfortable in no apparent distress.


HEAD: Normocephalic.


EYES: Normal reaction of pupils, equal size.


NOSE: Clear with pink turbinates.


THROAT: No erythema or exudates.


NECK: No masses, no JVD.


CHEST: No chest wall deformity.


LUNGS: Equal air entry with scattered rhonchi, end expiratory wheeze, d

iminished.


CVS: S1 and S2 normal with no audible murmur, regular rhythm.


ABDOMEN: No hepatosplenomegaly, normal bowel sounds, no guarding or rigidity.


SPINE: No scoliosis or deformity


SKIN: No rashes


CENTRAL NERVOUS SYSTEM: No focal deficits, tone is normal in all 4 extremities.


EXTREMITIES: There is no peripheral edema.  No clubbing, no cyanosis.  

Peripheral pulses are intact.





Results


CBC & Chem 7: 


                                 09/04/21 13:05





                                 09/04/21 02:52


Labs: 


                  Abnormal Lab Results - Last 24 Hours (Table)











  09/03/21 09/03/21 09/03/21 Range/Units





  19:08 19:08 19:08 


 


WBC  12.6 H    (3.8-10.6)  k/uL


 


RBC     (4.30-5.90)  m/uL


 


Hgb     (13.0-17.5)  gm/dL


 


Hct     (39.0-53.0)  %


 


Plt Count  459 H    (150-450)  k/uL


 


Neutrophils #  10.8 H    (1.3-7.7)  k/uL


 


Sodium   129 L   (137-145)  mmol/L


 


Chloride   87 L   ()  mmol/L


 


Carbon Dioxide   33 H   (22-30)  mmol/L


 


BUN   30 H   (9-20)  mg/dL


 


Glucose   262 H   (74-99)  mg/dL


 


Plasma Lactic Acid Paul    2.4 H*  (0.7-2.0)  mmol/L


 


Calcium   8.3 L   (8.4-10.2)  mg/dL


 


Magnesium   1.4 L   (1.6-2.3)  mg/dL














  09/03/21 09/04/21 09/04/21 Range/Units





  22:45 02:46 02:52 


 


WBC   10.9 H   (3.8-10.6)  k/uL


 


RBC   4.06 L   (4.30-5.90)  m/uL


 


Hgb   12.8 L   (13.0-17.5)  gm/dL


 


Hct   38.6 L   (39.0-53.0)  %


 


Plt Count     (150-450)  k/uL


 


Neutrophils #     (1.3-7.7)  k/uL


 


Sodium    129 L  (137-145)  mmol/L


 


Chloride    88 L  ()  mmol/L


 


Carbon Dioxide    35 H  (22-30)  mmol/L


 


BUN    32 H  (9-20)  mg/dL


 


Glucose    194 H  (74-99)  mg/dL


 


Plasma Lactic Acid Paul  2.9 H*    (0.7-2.0)  mmol/L


 


Calcium     (8.4-10.2)  mg/dL


 


Magnesium     (1.6-2.3)  mg/dL














  09/04/21 09/04/21 Range/Units





  02:52 13:05 


 


WBC  10.9 H   (3.8-10.6)  k/uL


 


RBC   3.75 L  (4.30-5.90)  m/uL


 


Hgb   12.0 L  (13.0-17.5)  gm/dL


 


Hct   35.7 L  (39.0-53.0)  %


 


Plt Count    (150-450)  k/uL


 


Neutrophils #  8.3 H   (1.3-7.7)  k/uL


 


Sodium    (137-145)  mmol/L


 


Chloride    ()  mmol/L


 


Carbon Dioxide    (22-30)  mmol/L


 


BUN    (9-20)  mg/dL


 


Glucose    (74-99)  mg/dL


 


Plasma Lactic Acid Paul    (0.7-2.0)  mmol/L


 


Calcium    (8.4-10.2)  mg/dL


 


Magnesium    (1.6-2.3)  mg/dL














Assessment and Plan


Assessment: 





1.  Acute lower GI bleed; patient had one episode of bright red blood per 

rectum; did have to preceding bowel movements without any bleed


- We will monitor H&H closely; Protonix 40 mg IV daily; we will plan to type 

crossmatch and transfuse if hemoglobin is less than 8.0


- Gen. surgery has been consulted; no GI service will be





2.  Acute hypoxemic respiratory failure; multifactorial; secondary to CPAP, acut

e exacerbation COPD





3.  Community-acquired pneumonia


- Patient received IV Levaquin and vancomycin in ED; pulmonary is consulted and 

have discontinued vancomycin and patient is initiated on ceftriaxone and 

azithromycin


- We will monitor CBC, CMP and pro-calcitonin; blood cultures and sputum culture

s obtained in ED





4.  Acute exacerbation COPD; continue with DuoNeb nebulizer treatments along 

with Pulmicort and Perforomist inhalations; IV Solu-Medrol


- Patient is on antibiotic therapy with Rocephin and ceftriaxone





5.  Diastolic CHF; not in exacerbation; patient remains on diuretic therapy with

 Lasix and Zaroxolyn





6.  Diabetes mellitus; monitor Accu-Cheks every before meals and at bedtime with

 insulin sliding scale





7.  Osteoarthritis; chronic left shoulder pain; patient currently taking N

aprosyn twice a day dose 





DVT prophylaxis; SCDs only due to GI bleed


CODE STATUS; full code

## 2021-09-04 NOTE — P.GSCN
History of Present Illness


Consult date: 09/04/21


History of present illness: 








CHIEF COMPLAINT: Hematochezia





HISTORY OF PRESENT ILLNESS: The patient is a 60 year old male is admitted to the

emergency room with concern for gastrointestinal bleeding.  Reports blood from 

his rectum.  Additional workup from ER included chest x-ray which demonstrated 

pneumonia.  Patient's hemoglobin has been within normal limits.  Due to patient 

concern for GI bleed including presence of occult stool positive blood, general 

surgery is consulted for further assessment.  Patient has not had prior 

colonoscopy in over 10 years.





Family is at bedside and confirms his colonoscopy was without polyps. He also 

reports isela-umbilical pain and hernia precipitating his bloody bowel movements.

He reports at least 4 toilet bowl full of blood. He denies any prior episodes.





PAST MEDICAL HISTORY: 


See list and reviewed





PAST SURGICAL HISTORY: 


See list and reviewed





MEDICATIONS: 


See list and reviewed





ALLERGIES: 


See list and reviewed





SOCIAL HISTORY: See list and reviewed





FAMILY HISTORY:  See list and reviewed





REVIEW OF ORGAN SYSTEMS:


CONSTITUTIONAL:  No fevers or chills.  Has morbid obesity, BMI 41.1.


EYES: Denies any trouble with vision. No glasses.


HEENT:  No difficulties with hearing. No nosebleeds.  No difficulty swallowing. 


RESPIRATORY:  Current pneumonia.  Has troubles with breathing or dyspnea on 

exertion.  Has chronic obstructive pulmonary disease.  Chronic tobacco abuse.


CARDIOVASCULAR: Has congestive heart failure on Zaroxolyn.


GASTROINTESTINAL: Denies fatty food intolerance.  Denies change in bowel habits 

and gas bloat.  


GENITOURINARY:  Denies any blood in urine or increased urinary frequency.  


NEUROLOGICAL:  Denies any numbness or tingling along the distal extremities. No 

seizure disorders or headaches.


MUSCULOSKELETAL:  Has  back pain, stiffness or joint arthritis. 


SKIN: No current skin cancer. No rash.


PSYCHIATRIC:  Denies current depression or suicidal thoughts.


ENDOCRINE:  Denies current thyroid disorders.   Has diabetes type 2.


HEME/LYMPHATIC: Denies any lumps and bumps around the neck. No recent deep 

venous thrombosis.


ALLERGY/IMMUNOLOGY:  No immunoglobulin therapy. No immune deficiencies.


BREAST: Denies current breast lumps, pain or nipple discharge.





PHYSICAL EXAM:


VITALS: Reviewed


CONSTITUTIONAL:  Well developed and in no acute distress. 


EYES:  Conjuctivae without sclera icterus.  Extraocular movements grossly 

intact. 


HEAD, EARS, NOSE, THROAT: Moist buccal mucosa. Head is atraumatic, 

normocephalic. Hears conversational speech. No nasal drainage. 


NECK:  Supple. No JV distention. No thyroidomegaly. Thick neck.


RESPIRATORY:  Non-labored respirations and equal bilateral excursions. No gross 

wheezes. 


CARDIOVASCULAR:  Regular rate and rhythm.  Extremities without moderate edema. 

Palpable 2+ radial pulses.


ABDOMEN:  Obese, no peritonitis. Umbilical hernia with thin skin, over 3 cm.


LYMPH: No neck lymphadenopathy. 


MUSCULOSKELETAL:  Nail and fingers with good capillary refill.


SKIN:  Warm and well perfused with good skin turgor.


NEUROLOGIC: Cranial nerves II through XII grossly intact. Sensation upper and 

extremities intact. No focal or lateralizing signs. 


PSYCH:  Appropriate affect.  Alert and oriented to person, place and time. 

Displays appropriate insight.





CLINCAL LABS: Reviewed.  WBC down 12.6-10.6.  Hemoglobin down 14.5-13.5.  Sodium

low 129.  





RADIOLOGY: Report reviewed a chest x-ray demonstrating pneumonia.





EKG: Abnormal with right bundle jeff block and septal infarct





ASSESSMENT: 


1.  Gastrointestinal bleeding 


2.  Congestive heart failure with pneumonia


3.  Hyponatremia 


4.  Lactic acidosis on admission


5.  Pneumonia


6.  Umbilical hernia





PLAN: 


1.   His clinical history including presenting events are suspicious for 

bleeding diverticulitis that presents in similar nature. Recommend CT of the 

abdomen and pelvis for diverticulitis/diverticulosis.


2.   He did present with bleeding of large amount per his description and may 

benefit from upper lower endoscopies for acute gastrointestinal bleeding


3.   He is elevated risk with congestive heart failure and volume overload from 

GI prep.








Thank you for this kind consultation.





Past Medical History


Past Medical History: Heart Failure, COPD, Diabetes Mellitus


History of Any Multi-Drug Resistant Organisms: MRSA


Year Discovered:: 6/23/16


MDRO Source:: Left Foot


Past Surgical History: Hernia Repair, Orthopedic Surgery, Tonsillectomy


Additional Past Surgical History / Comment(s): lt. 5th toe amp


Past Psychological History: No Psychological Hx Reported


Smoking Status: Current every day smoker


Past Alcohol Use History: None Reported


Past Drug Use History: None Reported





Medications and Allergies


                                Home Medications











 Medication  Instructions  Recorded  Confirmed  Type


 


Furosemide [Lasix] 40 mg PO BID 07/19/16 09/03/21 History


 


Sulfamethox-Tmp 800-160Mg [Bactrim 1 tab PO Q12HR 07/19/16 09/03/21 History





-160 mg]    


 


Acetaminophen Tab [Tylenol Tab] 1,000 mg PO Q6HR PRN 09/03/21 09/03/21 History


 


Albuterol Sulfate [Ventolin HFA] 1 - 2 puff INHALATION RT-Q6H PRN 09/03/21 09/ 03/21 History


 


Naproxen 500 mg PO BID PRN 09/03/21 09/03/21 History


 


Oxymetazoline HCl [Afrin 0.05%] 1 spray EA NOSTRIL DAILY PRN 09/03/21 09/03/21 

History


 


metOLazone [Zaroxolyn] 5 mg PO DAILY 09/03/21 09/03/21 History








                                    Allergies











Allergy/AdvReac Type Severity Reaction Status Date / Time


 


No Known Allergies Allergy   Verified 09/03/21 18:29














Surgical - Exam


                                   Vital Signs











Temp Pulse Resp BP Pulse Ox


 


 98.2 F   114 H  19   107/77   98 


 


 09/03/21 18:03  09/03/21 18:03  09/03/21 18:03  09/03/21 18:03  09/03/21 18:03














Results





- Labs





                                 09/04/21 13:05





                                 09/04/21 02:52


                  Abnormal Lab Results - Last 24 Hours (Table)











  09/03/21 09/03/21 09/03/21 Range/Units





  19:08 19:08 19:08 


 


WBC  12.6 H    (3.8-10.6)  k/uL


 


RBC     (4.30-5.90)  m/uL


 


Hgb     (13.0-17.5)  gm/dL


 


Hct     (39.0-53.0)  %


 


Plt Count  459 H    (150-450)  k/uL


 


Neutrophils #  10.8 H    (1.3-7.7)  k/uL


 


Sodium   129 L   (137-145)  mmol/L


 


Chloride   87 L   ()  mmol/L


 


Carbon Dioxide   33 H   (22-30)  mmol/L


 


BUN   30 H   (9-20)  mg/dL


 


Glucose   262 H   (74-99)  mg/dL


 


Plasma Lactic Acid Paul    2.4 H*  (0.7-2.0)  mmol/L


 


Calcium   8.3 L   (8.4-10.2)  mg/dL


 


Magnesium   1.4 L   (1.6-2.3)  mg/dL














  09/03/21 09/04/21 09/04/21 Range/Units





  22:45 02:46 02:52 


 


WBC   10.9 H   (3.8-10.6)  k/uL


 


RBC   4.06 L   (4.30-5.90)  m/uL


 


Hgb   12.8 L   (13.0-17.5)  gm/dL


 


Hct   38.6 L   (39.0-53.0)  %


 


Plt Count     (150-450)  k/uL


 


Neutrophils #     (1.3-7.7)  k/uL


 


Sodium    129 L  (137-145)  mmol/L


 


Chloride    88 L  ()  mmol/L


 


Carbon Dioxide    35 H  (22-30)  mmol/L


 


BUN    32 H  (9-20)  mg/dL


 


Glucose    194 H  (74-99)  mg/dL


 


Plasma Lactic Acid Paul  2.9 H*    (0.7-2.0)  mmol/L


 


Calcium     (8.4-10.2)  mg/dL


 


Magnesium     (1.6-2.3)  mg/dL














  09/04/21 Range/Units





  02:52 


 


WBC  10.9 H  (3.8-10.6)  k/uL


 


RBC   (4.30-5.90)  m/uL


 


Hgb   (13.0-17.5)  gm/dL


 


Hct   (39.0-53.0)  %


 


Plt Count   (150-450)  k/uL


 


Neutrophils #  8.3 H  (1.3-7.7)  k/uL


 


Sodium   (137-145)  mmol/L


 


Chloride   ()  mmol/L


 


Carbon Dioxide   (22-30)  mmol/L


 


BUN   (9-20)  mg/dL


 


Glucose   (74-99)  mg/dL


 


Plasma Lactic Acid Paul   (0.7-2.0)  mmol/L


 


Calcium   (8.4-10.2)  mg/dL


 


Magnesium   (1.6-2.3)  mg/dL








                                 Diabetes panel











  09/03/21 09/04/21 Range/Units





  19:08 02:52 


 


Sodium  129 L  129 L  (137-145)  mmol/L


 


Potassium  4.1  4.5  (3.5-5.1)  mmol/L


 


Chloride  87 L  88 L  ()  mmol/L


 


Carbon Dioxide  33 H  35 H  (22-30)  mmol/L


 


BUN  30 H  32 H  (9-20)  mg/dL


 


Creatinine  1.24  1.15  (0.66-1.25)  mg/dL


 


Glucose  262 H  194 H  (74-99)  mg/dL


 


Calcium  8.3 L  8.4  (8.4-10.2)  mg/dL


 


AST  23   (17-59)  U/L


 


ALT  17   (4-49)  U/L


 


Alkaline Phosphatase  92   ()  U/L


 


Total Protein  7.2   (6.3-8.2)  g/dL


 


Albumin  3.5   (3.5-5.0)  g/dL








                                  Calcium panel











  09/03/21 09/04/21 Range/Units





  19:08 02:52 


 


Calcium  8.3 L  8.4  (8.4-10.2)  mg/dL


 


Albumin  3.5   (3.5-5.0)  g/dL








                                 Pituitary panel











  09/03/21 09/04/21 Range/Units





  19:08 02:52 


 


Sodium  129 L  129 L  (137-145)  mmol/L


 


Potassium  4.1  4.5  (3.5-5.1)  mmol/L


 


Chloride  87 L  88 L  ()  mmol/L


 


Carbon Dioxide  33 H  35 H  (22-30)  mmol/L


 


BUN  30 H  32 H  (9-20)  mg/dL


 


Creatinine  1.24  1.15  (0.66-1.25)  mg/dL


 


Glucose  262 H  194 H  (74-99)  mg/dL


 


Calcium  8.3 L  8.4  (8.4-10.2)  mg/dL








                                  Adrenal panel











  09/03/21 09/04/21 Range/Units





  19:08 02:52 


 


Sodium  129 L  129 L  (137-145)  mmol/L


 


Potassium  4.1  4.5  (3.5-5.1)  mmol/L


 


Chloride  87 L  88 L  ()  mmol/L


 


Carbon Dioxide  33 H  35 H  (22-30)  mmol/L


 


BUN  30 H  32 H  (9-20)  mg/dL


 


Creatinine  1.24  1.15  (0.66-1.25)  mg/dL


 


Glucose  262 H  194 H  (74-99)  mg/dL


 


Calcium  8.3 L  8.4  (8.4-10.2)  mg/dL


 


Total Bilirubin  0.7   (0.2-1.3)  mg/dL


 


AST  23   (17-59)  U/L


 


ALT  17   (4-49)  U/L


 


Alkaline Phosphatase  92   ()  U/L


 


Total Protein  7.2   (6.3-8.2)  g/dL


 


Albumin  3.5   (3.5-5.0)  g/dL














Assessment and Plan


(1) CAP (community acquired pneumonia)


Current Visit: Yes   Status: Acute   Code(s): J18.9 - PNEUMONIA, UNSPECIFIED 

ORGANISM   SNOMED Code(s): 926976597


   





(2) Hematochezia


Current Visit: Yes   Status: Acute   Code(s): K92.1 - MELENA   SNOMED Code(s): 

841993686


   





(3) Morbid obesity with BMI of 40.0-44.9, adult


Current Visit: No   Status: Acute   Code(s): E66.01 - MORBID (SEVERE) OBESITY 

DUE TO EXCESS CALORIES   SNOMED Code(s): 363249934


   





(4) Type 2 diabetes mellitus


Current Visit: No   Status: Acute   Code(s): E11.9 - TYPE 2 DIABETES MELLITUS 

WITHOUT COMPLICATIONS   SNOMED Code(s): 06244905


   





(5) Umbilical hernia


Current Visit: Yes   Status: Acute   Code(s): K42.9 - UMBILICAL HERNIA WITHOUT 

OBSTRUCTION OR GANGRENE   SNOMED Code(s): 604630535


   





(6) Diverticulosis


Current Visit: Yes   Status: Acute   Code(s): K57.90 - DVRTCLOS OF INTEST, PART 

UNSP, W/O PERF OR ABSCESS W/O BLEED   SNOMED Code(s): 564714588

## 2021-09-05 LAB
ANION GAP SERPL CALC-SCNC: 5 MMOL/L
BASOPHILS # BLD AUTO: 0.1 K/UL (ref 0–0.2)
BASOPHILS NFR BLD AUTO: 1 %
BUN SERPL-SCNC: 20 MG/DL (ref 9–20)
CALCIUM SPEC-MCNC: 8.8 MG/DL (ref 8.4–10.2)
CHLORIDE SERPL-SCNC: 88 MMOL/L (ref 98–107)
CO2 SERPL-SCNC: 38 MMOL/L (ref 22–30)
EOSINOPHIL # BLD AUTO: 0.1 K/UL (ref 0–0.7)
EOSINOPHIL NFR BLD AUTO: 1 %
ERYTHROCYTE [DISTWIDTH] IN BLOOD BY AUTOMATED COUNT: 3.81 M/UL (ref 4.3–5.9)
ERYTHROCYTE [DISTWIDTH] IN BLOOD: 14 % (ref 11.5–15.5)
GLUCOSE SERPL-MCNC: 136 MG/DL (ref 74–99)
HCT VFR BLD AUTO: 35.7 % (ref 39–53)
HGB BLD-MCNC: 11.7 GM/DL (ref 13–17.5)
LYMPHOCYTES # SPEC AUTO: 1.5 K/UL (ref 1–4.8)
LYMPHOCYTES NFR SPEC AUTO: 18 %
MCH RBC QN AUTO: 30.7 PG (ref 25–35)
MCHC RBC AUTO-ENTMCNC: 32.8 G/DL (ref 31–37)
MCV RBC AUTO: 93.6 FL (ref 80–100)
MONOCYTES # BLD AUTO: 0.5 K/UL (ref 0–1)
MONOCYTES NFR BLD AUTO: 6 %
NEUTROPHILS # BLD AUTO: 6.1 K/UL (ref 1.3–7.7)
NEUTROPHILS NFR BLD AUTO: 72 %
PLATELET # BLD AUTO: 398 K/UL (ref 150–450)
POTASSIUM SERPL-SCNC: 3.9 MMOL/L (ref 3.5–5.1)
SODIUM SERPL-SCNC: 131 MMOL/L (ref 137–145)
WBC # BLD AUTO: 8.4 K/UL (ref 3.8–10.6)

## 2021-09-05 RX ADMIN — CEFAZOLIN SCH: 330 INJECTION, POWDER, FOR SOLUTION INTRAMUSCULAR; INTRAVENOUS at 21:15

## 2021-09-05 RX ADMIN — AZITHROMYCIN SCH MG: 500 TABLET, FILM COATED ORAL at 09:31

## 2021-09-05 RX ADMIN — IPRATROPIUM BROMIDE AND ALBUTEROL SULFATE SCH ML: .5; 3 SOLUTION RESPIRATORY (INHALATION) at 19:38

## 2021-09-05 RX ADMIN — FORMOTEROL FUMARATE DIHYDRATE SCH MCG: 20 SOLUTION RESPIRATORY (INHALATION) at 19:38

## 2021-09-05 RX ADMIN — CEFAZOLIN SCH MLS/HR: 330 INJECTION, POWDER, FOR SOLUTION INTRAMUSCULAR; INTRAVENOUS at 06:36

## 2021-09-05 RX ADMIN — IPRATROPIUM BROMIDE AND ALBUTEROL SULFATE SCH ML: .5; 3 SOLUTION RESPIRATORY (INHALATION) at 11:59

## 2021-09-05 RX ADMIN — FORMOTEROL FUMARATE DIHYDRATE SCH MCG: 20 SOLUTION RESPIRATORY (INHALATION) at 08:34

## 2021-09-05 RX ADMIN — BUDESONIDE SCH MG: 1 SUSPENSION RESPIRATORY (INHALATION) at 19:38

## 2021-09-05 RX ADMIN — PANTOPRAZOLE SODIUM SCH MG: 40 INJECTION, POWDER, FOR SOLUTION INTRAVENOUS at 21:14

## 2021-09-05 RX ADMIN — CEFAZOLIN SCH: 330 INJECTION, POWDER, FOR SOLUTION INTRAMUSCULAR; INTRAVENOUS at 04:24

## 2021-09-05 RX ADMIN — IPRATROPIUM BROMIDE AND ALBUTEROL SULFATE SCH ML: .5; 3 SOLUTION RESPIRATORY (INHALATION) at 08:34

## 2021-09-05 RX ADMIN — PANTOPRAZOLE SODIUM SCH MG: 40 INJECTION, POWDER, FOR SOLUTION INTRAVENOUS at 09:31

## 2021-09-05 RX ADMIN — IPRATROPIUM BROMIDE AND ALBUTEROL SULFATE SCH ML: .5; 3 SOLUTION RESPIRATORY (INHALATION) at 16:06

## 2021-09-05 RX ADMIN — BUDESONIDE SCH MG: 1 SUSPENSION RESPIRATORY (INHALATION) at 08:34

## 2021-09-05 RX ADMIN — NICOTINE SCH PATCH: 14 PATCH, EXTENDED RELEASE TRANSDERMAL at 09:31

## 2021-09-05 NOTE — P.PN
Subjective


Progress Note Date: 09/05/21





60-year-old male patient who follows with Dr. Arora as his primary care 

provider.  He has history of diabetes mellitus, diastolic congestive heart 

failure, chronic obstructive pulmonary disease, chronic and ongoing tobacco 

dependence.  He presented to the emergency room last evening with reports of br

ight red blood per rectum.  He had approximate 4 episode yesterday.  No previous

history of GI bleed.  Denies excessive alcohol use.  His been taking Naprosyn 

twice daily for left shoulder pain.  Chest x-ray revealed some mild infiltrates 

in the right lower lobe and left upper lobe.  We're consulted for the same.  He 

is seen in the emergency room.  Currently sitting up on the stretcher.  Awake 

and alert in no acute distress.  He is requiring 5 L high flow nasal cannula to 

maintain O2 saturations in the 90s.  He does have home oxygen.  He has a 

nebulizer with albuterol treatments.  He had not been seen by pulmonologist in 

the past.  He has been smoking for approximately 42 years.  White count 8.5.  

Hemoglobin 12.0.  Sodium 129.  Potassium 4.5.  Bicarb 35.  Creatinine 1.15.  

Initial lactic 2.9.  Currently 1.5.  ProBNP 43.  Stool for occult blood 

positive. 





09/05/2021


Patient is seen and evaluated in room with family members at bedside; report 

some improvement in breathing; has been evaluated by general surgery and is 

recommended colonoscopy


Vital signs remained stable temperature 97.9, pulse 80, respiration 18 and blood

pressure 130/73; lab review shows WBC 8.4, hemoglobin 11.7, platelet count of 

390,000, sodium 131, potassium 3.9, BUN/creatinine of 20/0.85 with lactic acid 

levels of 1.5; chest x-ray reveals right lower lobe and left upper lobe 

pneumonia


Pulmonary is following and recommending to continue with current IV antibiotic 

therapy in form of ceftriaxone and azithromycin; Pulmicort, formoterol, 

albuterol sulfate and prednisone has been added for acute exacerbation COPD


Patient has been evaluated by general surgery and is recommended upper and lower

endoscopy; given risk for CHF patient will receive low-volume prep; continue 

monitor CBC





Objective





- Vital Signs


Vital signs: 


                                   Vital Signs











Temp  97.9 F   09/05/21 08:00


 


Pulse  88   09/05/21 08:58


 


Resp  18   09/05/21 08:00


 


BP  130/73   09/05/21 08:00


 


Pulse Ox  99   09/05/21 08:00








                                 Intake & Output











 09/04/21 09/05/21 09/05/21





 18:59 06:59 18:59


 


Intake Total   880


 


Balance   880


 


Weight  143 kg 


 


Intake:   


 


  Oral   880


 


Other:   


 


  Voiding Method  Toilet 


 


  # Voids  1 


 


  # Bowel Movements  1 














- Exam





- Constitutional


General appearance: Present: average body habitus, cooperative, no acute 

distress


- EENT


Eyes: Present: anicteric sclerae, EOMI, PERRLA, normal appearance


ENT: Present: hearing grossly normal, normal oropharynx


Ears: bilateral: normal


- Neck


Neck: Present: normal ROM.  Absent: lymphadenopathy, rigidity, thyromegaly


Carotids: negative: bruit present


Thyroid: bilateral: normal size, negative: enlarged, nodule


- Respiratory


Respiratory: bilateral: CTA, negative: rales, rhonchi, wheezing


- Cardiovascular


Rhythm: regular


Heart sounds: normal: S1, S2


Abnormal Heart Sounds: Absent: systolic murmur, diastolic murmur


- Gastrointestinal


General gastrointestinal: Present: normal bowel sounds, soft.  Absent: 

distended, organomegaly, tenderness


- Genitourinary


Genitourinary Comment(s): deferred


- Integumentary


Integumentary: Present: normal turgor.  Absent: jaundiced, rash, ulcer


- Neurologic


Neurologic: Present: CNII-XII intact.  Absent: focal deficits


- Musculoskeletal


Musculoskeletal: Present: gait normal, strength equal bilaterally


- Psychiatric


Psychiatric: Present: A&O x's 3, appropriate affect, intact judgment & insight








- Labs


CBC & Chem 7: 


                                 09/05/21 07:47





                                 09/05/21 07:47


Labs: 


                  Abnormal Lab Results - Last 24 Hours (Table)











  09/04/21 09/05/21 09/05/21 Range/Units





  13:05 07:47 07:47 


 


RBC  3.75 L   3.81 L  (4.30-5.90)  m/uL


 


Hgb  12.0 L   11.7 L  (13.0-17.5)  gm/dL


 


Hct  35.7 L   35.7 L  (39.0-53.0)  %


 


Sodium   131 L   (137-145)  mmol/L


 


Chloride   88 L   ()  mmol/L


 


Carbon Dioxide   38 H   (22-30)  mmol/L


 


Glucose   136 H   (74-99)  mg/dL








                      Microbiology - Last 24 Hours (Table)











 09/03/21 21:26 Blood Culture - Preliminary





 Blood    No Growth after 24 hours


 


 09/03/21 21:10 Blood Culture - Preliminary





 Blood    No Growth after 24 hours














Assessment and Plan


Assessment: 





1.  Acute lower GI bleed; patient had one episode of bright red blood per 

rectum; did have to preceding bowel movements without any bleed


- We will monitor H&H closely; Protonix 40 mg IV daily; we will plan to type 

crossmatch and transfuse if hemoglobin is less than 8.0


- Gen. surgery has been consulted; no GI service will be





2.  Acute hypoxemic respiratory failure; multifactorial; secondary to CPAP, 

acute exacerbation COPD





3.  Community-acquired pneumonia


- Patient received IV Levaquin and vancomycin in ED; pulmonary is consulted and 

have discontinued vancomycin and patient is initiated on ceftriaxone and 

azithromycin


- We will monitor CBC, CMP and pro-calcitonin; blood cultures and sputum 

cultures obtained in ED





4.  Acute exacerbation COPD; continue with DuoNeb nebulizer treatments along 

with Pulmicort and Perforomist inhalations; IV Solu-Medrol


- Patient is on antibiotic therapy with Rocephin and ceftriaxone





5.  Diastolic CHF; not in exacerbation; patient remains on diuretic therapy with

Lasix and Zaroxolyn





6.  Diabetes mellitus; monitor Accu-Cheks every before meals and at bedtime with

insulin sliding scale





7.  Osteoarthritis; chronic left shoulder pain; patient currently taking 

Naprosyn twice a day dose 





DVT prophylaxis; SCDs only due to GI bleed


CODE STATUS; full code

## 2021-09-05 NOTE — P.PN
Subjective


Progress Note Date: 09/05/21


Principal diagnosis: 





Shortness of breath.





This is a 60-year-old male patient who follows with Dr. Arora as his primary

care provider.  He has history of diabetes mellitus, diastolic congestive heart 

failure, chronic obstructive pulmonary disease, chronic and ongoing tobacco 

dependence.  He presented to the emergency room last evening with reports of 

bright red blood per rectum.  He had approximate 4 episode yesterday.  No 

previous history of GI bleed.  Denies excessive alcohol use.  His been taking 

Naprosyn twice daily for left shoulder pain.  Chest x-ray revealed some mild 

infiltrates in the right lower lobe and left upper lobe.  We're consulted for 

the same.  He is seen in the emergency room.  Currently sitting up on the 

stretcher.  Awake and alert in no acute distress.  He is requiring 5 L high flow

nasal cannula to maintain O2 saturations in the 90s.  He does have home oxygen. 

He has a nebulizer with albuterol treatments.  He had not been seen by pulmonlily chakraborty in the past.  He has been smoking for approximately 42 years.  White count 

8.5.  Hemoglobin 12.0.  Sodium 129.  Potassium 4.5.  Bicarb 35.  Creatinine 

1.15.  Initial lactic 2.9.  Currently 1.5.  ProBNP 43.  Stool for occult blood 

positive.  He's been initiated on vancomycin, DuoNeb inhalations, Protonix.  0.9

normal saline at 75 ML's per hour.





Progress note dated 09/05/2021.





60-year-old male, seen in consultation.  He has a history of diabetes mellitus, 

diastolic CHF, COPD, and chronic and ongoing tobacco dependence.  He presented 

to the emergency room, with bright red bleeding per rectum.  Also, the patient 

was complaining of being short of breath.  His COPD, was likely active.  Curr

ently, he's feeling better.  He does continue to smoke but states he is really 

trying seriously to quit.  Laboratory data includes a white count 8.4, 

hemoglobin 11.7, hematocrit 35.7, and a platelet count of 390,000.  Sodium 131, 

potassium 3.9, chlorides 88, CO2 38, anion gap is 5, BUN is 20, with creatinine 

0.85.  Lactate when last checked was 1.5.  Chest x-ray showed pneumonia 

involving the right lower lobe and left upper lobe.





Objective





- Vital Signs


Vital signs: 


                                   Vital Signs











Temp  97.9 F   09/05/21 08:00


 


Pulse  88   09/05/21 12:10


 


Resp  18   09/05/21 08:00


 


BP  130/73   09/05/21 08:00


 


Pulse Ox  99   09/05/21 08:00








                                 Intake & Output











 09/04/21 09/05/21 09/05/21





 18:59 06:59 18:59


 


Intake Total   880


 


Balance   880


 


Weight  143 kg 


 


Intake:   


 


  Oral   880


 


Other:   


 


  Voiding Method  Toilet 


 


  # Voids  1 


 


  # Bowel Movements  1 














- Exam





No acute distress, oriented 3.  Nasal cannula in place.





HEENT examination is grossly unremarkable.  





Neck supple.  Full range of motion.  No adenopathy thyromegaly or neck vein 

distention.





Cardiovascular examination reveals regular rhythm rate.  S1-S2 normal.  No S3 or

S4.  No discernible murmur noted.  Heart sounds are distant.  Heart rate 88 bpm.





Lungs reveal bilateral coarse expiratory rhonchi and expiratory wheezes.  Breath

sounds equal bilaterally.  There is mild prolongation.  No crackles.





Abdomen soft bowel sounds are heard.  No masses or tenderness.





Extremities are intact.  No cyanosis clubbing or edema.





Skin is without rash or lesion.





Neurologic examination is brief but nonfocal.





- Labs


CBC & Chem 7: 


                                 09/05/21 07:47





                                 09/05/21 07:47


Labs: 


                  Abnormal Lab Results - Last 24 Hours (Table)











  09/04/21 09/05/21 09/05/21 Range/Units





  13:05 07:47 07:47 


 


RBC  3.75 L   3.81 L  (4.30-5.90)  m/uL


 


Hgb  12.0 L   11.7 L  (13.0-17.5)  gm/dL


 


Hct  35.7 L   35.7 L  (39.0-53.0)  %


 


Sodium   131 L   (137-145)  mmol/L


 


Chloride   88 L   ()  mmol/L


 


Carbon Dioxide   38 H   (22-30)  mmol/L


 


Glucose   136 H   (74-99)  mg/dL








                      Microbiology - Last 24 Hours (Table)











 09/03/21 21:26 Blood Culture - Preliminary





 Blood    No Growth after 24 hours


 


 09/03/21 21:10 Blood Culture - Preliminary





 Blood    No Growth after 24 hours














Assessment and Plan


Assessment: 





1 Acute lower GI bleed with bright red blood per rectum.





2 Acute hypoxemic respiratory failure secondary to an acute community-acquired 

pneumonia, right lower lobe and left upper lobe.





3 Acute exacerbation of chronic obstructive pulmonary disease secondary to 

above.





4 Chronic and ongoing tobacco dependence of greater than 40 years.





5 History of diastolic congestive heart failure, maintained on Lasix and 

Zaroxolyn in the outpatient setting.





6 Chronic left shoulder pain. 





7 Diabetes mellitus.


Plan: 





Plan dated 09/05/2021.





The patient remains on antibiotics in form of ceftriaxone and azithromycin.  The

patient is also receiving Pulmicort, formoterol, albuterol sulfate, and 

ipratropium bromide.  The patient is taking prednisone 40 mg a day.  His 

breathing is better today than it was yesterday.  We will continue to follow.  

His GI bleed is being followed by the primary service, and surgery.


Time with Patient: Less than 30

## 2021-09-05 NOTE — P.PN
Subjective


Progress Note Date: 09/05/21








CHIEF COMPLAINT: Hematochezia





HISTORY OF PRESENT ILLNESS: The patient is a 60 year old male is admitted to the

emergency room with concern for gastrointestinal bleeding 2 days ago. This 

afternoon, he has blood in stools. No acute abdominal pain. 





REVIEW OF ORGAN SYSTEMS: Has shortness of breath. No active chest pain. Reports 

persistent blood in stools








PHYSICAL EXAM:


VITALS: Reviewed


CONSTITUTIONAL:  Well developed and in no acute distress. 


EYES:  Conjuctivae without sclera icterus.  Extraocular movements grossly 

intact. 


HEAD, EARS, NOSE, THROAT: Moist buccal mucosa. Head is atraumatic, normoc

ephalic. Hears conversational speech. No nasal drainage. 


RESPIRATORY:  Labored respirations and equal bilateral excursions. No gross 

wheezes. 


CARDIOVASCULAR:  Regular rate and rhythm.  


ABDOMEN:  Obese, no peritonitis. Umbilical hernia with thin skin, over 3 cm.


MUSCULOSKELETAL:  Nail and fingers with good capillary refill.


SKIN:  Warm and well perfused with good skin turgor.


NEUROLOGIC: Cranial nerves II through XII grossly intact. Sensation upper and 

extremities intact. No focal or lateralizing signs. 


PSYCH:  Appropriate affect.  Alert and oriented to person, place and time. 

Displays appropriate insight.





CLINCAL LABS: Reviewed.  WBC down 12.6-10.6.  Hemoglobin down 14.5-13.5 now 11.7

   





RADIOLOGY: CT of the abdomen and pelvis independently reviewed with incarcerated

umbilical hernia containing fat. Features of descending diverticulosis without 

diverticuilitis. Gallstones found along the dependent portion of the 

gallbladder. This is my independent interpretation. 





ASSESSMENT: 


1.  Gastrointestinal bleeding 


2.  Congestive heart failure with pneumonia


3.  Hyponatremia 


4.  Lactic acidosis on admission


5.  Pneumonia


6.  Umbilical hernia


7.  Gallstones


8.  Acute blood loss anemia





PLAN:


1. He has new findings of acute blood loss anemia with continued GI bleed. 

Recommend upper and lower endoscopy. He is elevated risk with congestive heart 

failure.


2. Continue clear liquid diet and low volume prep


3.  He has new gallstones diagnosis and reviewed with him as well 





Objective





- Vital Signs


Vital signs: 


                                   Vital Signs











Temp  97.9 F   09/05/21 08:00


 


Pulse  88   09/05/21 12:10


 


Resp  20   09/05/21 12:00


 


BP  143/75   09/05/21 12:00


 


Pulse Ox  96   09/05/21 12:00








                                 Intake & Output











 09/04/21 09/05/21 09/05/21





 18:59 06:59 18:59


 


Intake Total   880


 


Balance   880


 


Weight  143 kg 


 


Intake:   


 


  Oral   880


 


Other:   


 


  Voiding Method  Toilet 


 


  # Voids  1 


 


  # Bowel Movements  1 














- Labs


CBC & Chem 7: 


                                 09/05/21 07:47





                                 09/05/21 07:47


Labs: 


                  Abnormal Lab Results - Last 24 Hours (Table)











  09/04/21 09/05/21 09/05/21 Range/Units





  13:05 07:47 07:47 


 


RBC  3.75 L   3.81 L  (4.30-5.90)  m/uL


 


Hgb  12.0 L   11.7 L  (13.0-17.5)  gm/dL


 


Hct  35.7 L   35.7 L  (39.0-53.0)  %


 


Sodium   131 L   (137-145)  mmol/L


 


Chloride   88 L   ()  mmol/L


 


Carbon Dioxide   38 H   (22-30)  mmol/L


 


Glucose   136 H   (74-99)  mg/dL








                      Microbiology - Last 24 Hours (Table)











 09/03/21 21:26 Blood Culture - Preliminary





 Blood    No Growth after 24 hours


 


 09/03/21 21:10 Blood Culture - Preliminary





 Blood    No Growth after 24 hours














Assessment and Plan


(1) CAP (community acquired pneumonia)


Current Visit: Yes   Status: Acute   Code(s): J18.9 - PNEUMONIA, UNSPECIFIED 

ORGANISM   SNOMED Code(s): 102518939


   





(2) Hematochezia


Current Visit: Yes   Status: Acute   Code(s): K92.1 - MELENA   SNOMED Code(s): 

515516022


   





(3) Morbid obesity with BMI of 40.0-44.9, adult


Current Visit: No   Status: Acute   Code(s): E66.01 - MORBID (SEVERE) OBESITY 

DUE TO EXCESS CALORIES   SNOMED Code(s): 187671798


   





(4) Type 2 diabetes mellitus


Current Visit: No   Status: Acute   Code(s): E11.9 - TYPE 2 DIABETES MELLITUS 

WITHOUT COMPLICATIONS   SNOMED Code(s): 53550154


   





(5) Umbilical hernia


Current Visit: Yes   Status: Acute   Code(s): K42.9 - UMBILICAL HERNIA WITHOUT 

OBSTRUCTION OR GANGRENE   SNOMED Code(s): 701481180


   





(6) Diverticulosis


Current Visit: Yes   Status: Acute   Code(s): K57.90 - DVRTCLOS OF INTEST, PART 

UNSP, W/O PERF OR ABSCESS W/O BLEED   SNOMED Code(s): 409020624


   





(7) Acute blood loss anemia


Current Visit: Yes   Status: Acute   Code(s): D62 - ACUTE POSTHEMORRHAGIC ANEMIA

  SNOMED Code(s): 125787309


   





(8) Gallstones


Current Visit: Yes   Status: Acute   Code(s): K80.20 - CALCULUS OF GALLBLADDER 

W/O CHOLECYSTITIS W/O OBSTRUCTION   SNOMED Code(s): 885287628

## 2021-09-06 LAB
ANION GAP SERPL CALC-SCNC: 4 MMOL/L
BASOPHILS # BLD AUTO: 0 K/UL (ref 0–0.2)
BASOPHILS NFR BLD AUTO: 1 %
BUN SERPL-SCNC: 11 MG/DL (ref 9–20)
CALCIUM SPEC-MCNC: 8.8 MG/DL (ref 8.4–10.2)
CHLORIDE SERPL-SCNC: 91 MMOL/L (ref 98–107)
CO2 SERPL-SCNC: 39 MMOL/L (ref 22–30)
EOSINOPHIL # BLD AUTO: 0 K/UL (ref 0–0.7)
EOSINOPHIL NFR BLD AUTO: 0 %
ERYTHROCYTE [DISTWIDTH] IN BLOOD BY AUTOMATED COUNT: 3.5 M/UL (ref 4.3–5.9)
ERYTHROCYTE [DISTWIDTH] IN BLOOD: 14.3 % (ref 11.5–15.5)
GLUCOSE BLD-MCNC: 124 MG/DL (ref 75–99)
GLUCOSE BLD-MCNC: 131 MG/DL (ref 75–99)
GLUCOSE BLD-MCNC: 179 MG/DL (ref 75–99)
GLUCOSE BLD-MCNC: 183 MG/DL (ref 75–99)
GLUCOSE SERPL-MCNC: 121 MG/DL (ref 74–99)
HCT VFR BLD AUTO: 32.8 % (ref 39–53)
HGB BLD-MCNC: 10.9 GM/DL (ref 13–17.5)
LYMPHOCYTES # SPEC AUTO: 1.6 K/UL (ref 1–4.8)
LYMPHOCYTES NFR SPEC AUTO: 23 %
MAGNESIUM SPEC-SCNC: 1.9 MG/DL (ref 1.6–2.3)
MCH RBC QN AUTO: 31 PG (ref 25–35)
MCHC RBC AUTO-ENTMCNC: 33.1 G/DL (ref 31–37)
MCV RBC AUTO: 93.6 FL (ref 80–100)
MONOCYTES # BLD AUTO: 0.5 K/UL (ref 0–1)
MONOCYTES NFR BLD AUTO: 7 %
NEUTROPHILS # BLD AUTO: 4.6 K/UL (ref 1.3–7.7)
NEUTROPHILS NFR BLD AUTO: 66 %
PLATELET # BLD AUTO: 412 K/UL (ref 150–450)
POTASSIUM SERPL-SCNC: 3.4 MMOL/L (ref 3.5–5.1)
SODIUM SERPL-SCNC: 134 MMOL/L (ref 137–145)
WBC # BLD AUTO: 7 K/UL (ref 3.8–10.6)

## 2021-09-06 PROCEDURE — 0DBN8ZX EXCISION OF SIGMOID COLON, VIA NATURAL OR ARTIFICIAL OPENING ENDOSCOPIC, DIAGNOSTIC: ICD-10-PCS

## 2021-09-06 PROCEDURE — 0DJ08ZZ INSPECTION OF UPPER INTESTINAL TRACT, VIA NATURAL OR ARTIFICIAL OPENING ENDOSCOPIC: ICD-10-PCS

## 2021-09-06 RX ADMIN — PANTOPRAZOLE SODIUM SCH MG: 40 INJECTION, POWDER, FOR SOLUTION INTRAVENOUS at 08:16

## 2021-09-06 RX ADMIN — FORMOTEROL FUMARATE DIHYDRATE SCH MCG: 20 SOLUTION RESPIRATORY (INHALATION) at 19:47

## 2021-09-06 RX ADMIN — POTASSIUM CHLORIDE SCH MEQ: 20 TABLET, EXTENDED RELEASE ORAL at 15:38

## 2021-09-06 RX ADMIN — INSULIN ASPART SCH: 100 INJECTION, SOLUTION INTRAVENOUS; SUBCUTANEOUS at 12:56

## 2021-09-06 RX ADMIN — IPRATROPIUM BROMIDE AND ALBUTEROL SULFATE SCH ML: .5; 3 SOLUTION RESPIRATORY (INHALATION) at 11:25

## 2021-09-06 RX ADMIN — AZITHROMYCIN SCH MG: 500 TABLET, FILM COATED ORAL at 15:38

## 2021-09-06 RX ADMIN — INSULIN ASPART SCH: 100 INJECTION, SOLUTION INTRAVENOUS; SUBCUTANEOUS at 17:13

## 2021-09-06 RX ADMIN — NICOTINE SCH PATCH: 14 PATCH, EXTENDED RELEASE TRANSDERMAL at 08:16

## 2021-09-06 RX ADMIN — POTASSIUM CHLORIDE SCH MEQ: 20 TABLET, EXTENDED RELEASE ORAL at 17:01

## 2021-09-06 RX ADMIN — IPRATROPIUM BROMIDE AND ALBUTEROL SULFATE SCH ML: .5; 3 SOLUTION RESPIRATORY (INHALATION) at 08:08

## 2021-09-06 RX ADMIN — IPRATROPIUM BROMIDE AND ALBUTEROL SULFATE SCH ML: .5; 3 SOLUTION RESPIRATORY (INHALATION) at 15:13

## 2021-09-06 RX ADMIN — IPRATROPIUM BROMIDE AND ALBUTEROL SULFATE SCH ML: .5; 3 SOLUTION RESPIRATORY (INHALATION) at 19:47

## 2021-09-06 RX ADMIN — BUDESONIDE SCH MG: 1 SUSPENSION RESPIRATORY (INHALATION) at 19:47

## 2021-09-06 RX ADMIN — INSULIN ASPART SCH: 100 INJECTION, SOLUTION INTRAVENOUS; SUBCUTANEOUS at 06:09

## 2021-09-06 RX ADMIN — INSULIN ASPART SCH: 100 INJECTION, SOLUTION INTRAVENOUS; SUBCUTANEOUS at 20:18

## 2021-09-06 RX ADMIN — BUDESONIDE SCH MG: 1 SUSPENSION RESPIRATORY (INHALATION) at 08:07

## 2021-09-06 RX ADMIN — FORMOTEROL FUMARATE DIHYDRATE SCH MCG: 20 SOLUTION RESPIRATORY (INHALATION) at 08:08

## 2021-09-06 NOTE — P.PN
Subjective


Progress Note Date: 09/06/21


Principal diagnosis: 





Bilateral pneumonia





This is a 60-year-old male patient who follows with Dr. Arora as his primary

care provider.  He has history of diabetes mellitus, diastolic congestive heart 

failure, chronic obstructive pulmonary disease, chronic and ongoing tobacco d

ependence.  He presented to the emergency room last evening with reports of 

bright red blood per rectum.  He had approximate 4 episode yesterday.  No 

previous history of GI bleed.  Denies excessive alcohol use.  His been taking 

Naprosyn twice daily for left shoulder pain.  Chest x-ray revealed some mild 

infiltrates in the right lower lobe and left upper lobe.  We're consulted for 

the same.  He is seen in the emergency room.  Currently sitting up on the 

stretcher.  Awake and alert in no acute distress.  He is requiring 5 L high flow

nasal cannula to maintain O2 saturations in the 90s.  He does have home oxygen. 

He has a nebulizer with albuterol treatments.  He had not been seen by 

pulmonologist in the past.  He has been smoking for approximately 42 years.  

White count 8.5.  Hemoglobin 12.0.  Sodium 129.  Potassium 4.5.  Bicarb 35.  

Creatinine 1.15.  Initial lactic 2.9.  Currently 1.5.  ProBNP 43.  Stool for 

occult blood positive.  He's been initiated on vancomycin, DuoNeb inhalations, 

Protonix.  0.9 normal saline at 75 ML's per hour.





Progress note dated 09/05/2021.





60-year-old male, seen in consultation.  He has a history of diabetes mellitus, 

diastolic CHF, COPD, and chronic and ongoing tobacco dependence.  He presented 

to the emergency room, with bright red bleeding per rectum.  Also, the patient 

was complaining of being short of breath.  His COPD, was likely active.  Curre

ntly, he's feeling better.  He does continue to smoke but states he is really 

trying seriously to quit.  Laboratory data includes a white count 8.4, 

hemoglobin 11.7, hematocrit 35.7, and a platelet count of 390,000.  Sodium 131, 

potassium 3.9, chlorides 88, CO2 38, anion gap is 5, BUN is 20, with creatinine 

0.85.  Lactate when last checked was 1.5.  Chest x-ray showed pneumonia 

involving the right lower lobe and left upper lobe.








The patient is seen today 09/06/2021 in follow-up on the regular medical floor. 

He is currently sitting up in a chair at the bedside.  Awake and alert in no 

acute distress.  Feeling a bit better today compared to yesterday.  He is 

maintaining O2 saturations up to 100% on 3 L/m per nasal cannula.  He's been 

afebrile.  Hemodynamically stable.  Follow-up chest x-ray reveals persistent 

left upper lobe density and mild strandy basilar densities.  White count 7.0.  

Hemoglobin 10.9.  Sodium 134.  Potassium 3.4.  Creatinine 0.73.  Plan is for 

EGD/colonoscopy today.  He remains on ceftriaxone and azithromycin along with 

DuoNeb inhalations, Pulmicort and Perforomist inhalations, NicoDerm patch, oral 

prednisone. 





Objective





- Vital Signs


Vital signs: 


                                   Vital Signs











Temp  98.7 F   09/06/21 08:00


 


Pulse  76   09/06/21 11:36


 


Resp  18   09/06/21 11:34


 


BP  105/66   09/06/21 11:34


 


Pulse Ox  100   09/06/21 11:34








                                 Intake & Output











 09/05/21 09/06/21 09/06/21





 18:59 06:59 18:59


 


Intake Total 2078 240 0


 


Output Total 475 500 


 


Balance 1603 -260 0


 


Weight  133.1 kg 


 


Intake:   


 


  Oral 2078 240 0


 


Output:   


 


  Urine 475 500 


 


Other:   


 


  Voiding Method  Toilet 





  Urinal 


 


  # Voids   2


 


  # Bowel Movements  2 














- Exam





GENERAL EXAM: Alert, pleasant 60-year-old gentleman, on 3 L nasal cannula, 

comfortable in no apparent distress.


HEAD: Normocephalic.


EYES: Normal reaction of pupils, equal size.


NOSE: Clear with pink turbinates.


THROAT: No erythema or exudates.


NECK: No masses, no JVD.


CHEST: No chest wall deformity.


LUNGS: Equal air entry with bilateral end expiratory wheeze, few scattered 

rhonchi, diminished.


CVS: S1 and S2 normal with no audible murmur, regular rhythm.


ABDOMEN: No hepatosplenomegaly, normal bowel sounds, no guarding or rigidity.


SPINE: No scoliosis or deformity


SKIN: No rashes


CENTRAL NERVOUS SYSTEM: No focal deficits, tone is normal in all 4 extremities.


EXTREMITIES: There is no peripheral edema.  No clubbing, no cyanosis.  

Peripheral pulses are intact.





- Labs


CBC & Chem 7: 


                                 09/06/21 08:10





                                 09/06/21 08:10


Labs: 


                  Abnormal Lab Results - Last 24 Hours (Table)











  09/06/21 09/06/21 09/06/21 Range/Units





  06:01 08:10 08:10 


 


RBC   3.50 L   (4.30-5.90)  m/uL


 


Hgb   10.9 L   (13.0-17.5)  gm/dL


 


Hct   32.8 L   (39.0-53.0)  %


 


Sodium    134 L  (137-145)  mmol/L


 


Potassium    3.4 L  (3.5-5.1)  mmol/L


 


Chloride    91 L  ()  mmol/L


 


Carbon Dioxide    39 H  (22-30)  mmol/L


 


Glucose    121 H  (74-99)  mg/dL


 


POC Glucose (mg/dL)  131 H    (75-99)  mg/dL














  09/06/21 Range/Units





  12:04 


 


RBC   (4.30-5.90)  m/uL


 


Hgb   (13.0-17.5)  gm/dL


 


Hct   (39.0-53.0)  %


 


Sodium   (137-145)  mmol/L


 


Potassium   (3.5-5.1)  mmol/L


 


Chloride   ()  mmol/L


 


Carbon Dioxide   (22-30)  mmol/L


 


Glucose   (74-99)  mg/dL


 


POC Glucose (mg/dL)  124 H  (75-99)  mg/dL








                      Microbiology - Last 24 Hours (Table)











 09/03/21 21:26 Blood Culture - Preliminary





 Blood    No Growth after 48 hours


 


 09/03/21 21:10 Blood Culture - Preliminary





 Blood    No Growth after 48 hours














Assessment and Plan


Assessment: 





1 Acute lower GI bleed with bright red blood per rectum, plan is for 

EGD/colonoscopy





2 Acute hypoxemic respiratory failure secondary to an acute community-acquired 

pneumonia





3 Acute exacerbation of chronic obstructive pulmonary disease secondary to above





4 Chronic and ongoing tobacco dependence of greater than 40 years





5 History of diastolic congestive heart failure, maintained on Lasix and 

Zaroxolyn in the outpatient setting





6 Chronic left shoulder pain taking Naprosyn twice daily





7 Diabetes mellitus





Plan:





The patient was seen and evaluated by Dr. Marin


Chest x-ray and labs reviewed


Continue ceftriaxone and azithromycin


Continue DuoNeb inhalations, Pulmicort and Perforomist inhalations, prednisone


Again educated regarding the importance of complete smoking cessation


Plan is for EGD/colonoscopy


Remains on IV Protonix


Titrate the FiO2 as tolerated


We will continue to follow





I, the cosigning physician, performed a history & physical examination of the 

patient. Lungs sounds with bilateral scattered rhonchi, end expiratory wheeze, 

diminished.  Maintaining good O2 saturations in the 90s on 3 L/m per nasal 

cannula.  I discussed the assessment and plan of care with my nurse 

practitioner, Sandra Bustillos. I attest to the above note as dictated by her.

## 2021-09-06 NOTE — P.PCN
Date of Procedure: 09/06/21


Description of Procedure: 























PREOPERATIVE DIAGNOSIS:


Gastrointestinal bleeding


Acute blood loss anemia


Acute congestive heart failure with exacerbation


Acute chronic obstructive pulmonary disease with exacerbation





POSTOPERATIVE DIAGNOSIS:


Sigmoid diverticulosis with pandiverticulosis


Sigmoid colon adenoma





OPERATION:


Colonoscopy to the ileocecal valve and appendiceal orifice, cecum


Colonoscopy with hot snare polypectomy





SURGEON: Michelle Saldaña MD.





ANESTHESIA: MAC.





INDICATIONS:


The patient is an 60-year-old male who presented with blood in stools acutely 

including 4 g drop in hemoglobin during his hospitalization.  Lower endoscopy 

was offered for diagnosis and treatment.  Benefits and risks were described and 

informed consent was obtained.





DESCRIPTION OF PROCEDURE:


The patient had undergone low-volume 2 L prep due to congestive heart failure.  

The patient had been brought into the operating room and laid in the left 

lateral decubitus position.  After adequate intravenous sedation, the rectum was

examined with 2% lidocaine jelly. No external hemorrhoids were encountered. The 

rectal tone was within normal limits. No lesions were palpated in the rectal 

vault. An Olympus colonoscope was advanced until the cecum, ileocecal valve and 

appendiceal orifice were clearly viewed.  The prep was fair.  Sigmoid 

diverticulosis was encountered with pandiverticulosis.  Colonic polyps were 

found and removed.  No evidence of focal colitis was found.  No stigmata of 

bleeding was identified.  Retroflexion of the scope demonstrated grade 1 

internal hemorrhoids without active bleeding or inflammation. The colon was 

desufflated. The patient had tolerated the procedure well. 





Withdrawal time was over 6 minutes.





FINDINGS:


Aronchick preparation quality scale 3 (1-5)


Internal hemorrhoids, grade 1


No external hemorrhoids


No arteriovenous malformations.


Sigmoid diverticulosis with pandiverticulosis


No active stigmata of bleeding


Removal of 1 polyps:


- Snare polypectomy 20 cm from the anal verge, large 15 mm tubular adenoma 

polyp.


No focal colitis.





RECOMMENDATIONS:


1.  Recommend repeat lower endoscopy in 2 years, 2023


2.  May start regular diet


3.  Any further recurrent bleeding may benefit from nuclear bleeding scan or 

small bowel endoscopy





Plan - Discharge Summary


Discharge Rx Participant: No


New Discharge Prescriptions: 


No Action


   Sulfamethox-Tmp 800-160Mg [Bactrim -160 mg] 1 tab PO Q12HR


   Furosemide [Lasix] 40 mg PO BID


   Albuterol Sulfate [Ventolin HFA] 1 - 2 puff INHALATION RT-Q6H PRN


     PRN Reason: Shortness Of Breath


   Acetaminophen Tab [Tylenol Tab] 1,000 mg PO Q6HR PRN


     PRN Reason: Pain


   Oxymetazoline HCl [Afrin 0.05%] 1 spray EA NOSTRIL DAILY PRN


     PRN Reason: Allergy Symptoms


   Naproxen 500 mg PO BID PRN


     PRN Reason: Pain


   metOLazone [Zaroxolyn] 5 mg PO DAILY


Discharge Medication List





Furosemide [Lasix] 40 mg PO BID 07/19/16 [History]


Sulfamethox-Tmp 800-160Mg [Bactrim -160 mg] 1 tab PO Q12HR 07/19/16 

[History]


Acetaminophen Tab [Tylenol Tab] 1,000 mg PO Q6HR PRN 09/03/21 [History]


Albuterol Sulfate [Ventolin HFA] 1 - 2 puff INHALATION RT-Q6H PRN 09/03/21 

[History]


Naproxen 500 mg PO BID PRN 09/03/21 [History]


Oxymetazoline HCl [Afrin 0.05%] 1 spray EA NOSTRIL DAILY PRN 09/03/21 [History]


metOLazone [Zaroxolyn] 5 mg PO DAILY 09/03/21 [History]








Follow up Appointment(s)/Referral(s): 


Jesica Arora MD [Primary Care Provider] - 1-2 days

## 2021-09-06 NOTE — XR
EXAMINATION TYPE: XR chest 1V portable

 

DATE OF EXAM: 9/6/2021

 

COMPARISON: 9/3/2021

 

HISTORY: Shortness of breath

 

TECHNIQUE:  Frontal and lateral views of the chest are obtained.

 

FINDINGS:

 

Scattered senescent parenchymal changes noted. Hyperinflation compatible with COPD. 

 

Persistent left upper lobe density with mild strandy basilar density.

 

Heart size is stable.

 

Mediastinal structures are stable and grossly unremarkable.

 

No evidence for hilar prominence.

 

Degenerative changes dorsal spine. 

 

IMPRESSION:

1. Persistent left upper lobe density with mild strandy basilar density.

## 2021-09-06 NOTE — P.PN
Subjective





60-year-old male patient who follows with Dr. Arora as his primary care 

provider.  He has history of diabetes mellitus, diastolic congestive heart 

failure, chronic obstructive pulmonary disease, chronic and ongoing tobacco 

dependence.  He presented to the emergency room last evening with reports of 

bright red blood per rectum.  He had approximate 4 episode yesterday.  No 

previous history of GI bleed.  Denies excessive alcohol use.  His been taking 

Naprosyn twice daily for left shoulder pain.  Chest x-ray revealed some mild 

infiltrates in the right lower lobe and left upper lobe.  We're consulted for 

the same.  He is seen in the emergency room.  Currently sitting up on the 

stretcher.  Awake and alert in no acute distress.  He is requiring 5 L high flow

nasal cannula to maintain O2 saturations in the 90s.  He does have home oxygen. 

He has a nebulizer with albuterol treatments.  He had not been seen by 

pulmonologist in the past.  He has been smoking for approximately 42 years.  

White count 8.5.  Hemoglobin 12.0.  Sodium 129.  Potassium 4.5.  Bicarb 35.  

Creatinine 1.15.  Initial lactic 2.9.  Currently 1.5.  ProBNP 43.  Stool for 

occult blood positive. 





09/05/2021


Patient is seen and evaluated in room with family members at bedside; report 

some improvement in breathing; has been evaluated by general surgery and is 

recommended colonoscopy


Vital signs remained stable temperature 97.9, pulse 80, respiration 18 and blood

pressure 130/73; lab review shows WBC 8.4, hemoglobin 11.7, platelet count of 39

0,000, sodium 131, potassium 3.9, BUN/creatinine of 20/0.85 with lactic acid 

levels of 1.5; chest x-ray reveals right lower lobe and left upper lobe 

pneumonia


Pulmonary is following and recommending to continue with current IV antibiotic 

therapy in form of ceftriaxone and azithromycin; Pulmicort, formoterol, 

albuterol sulfate and prednisone has been added for acute exacerbation COPD


Patient has been evaluated by general surgery and is recommended upper and lower

endoscopy; given risk for CHF patient will receive low-volume prep; continue 

monitor CBC








Subjective:


9/6/21


This is a pleasant 60 years old male who presents with bright red blood per 

rectum on 9/3 and has been evaluated by surgery team and he underwent 

colonoscopy today showing internal hemorrhoids with sigmoid diverticulosis and 

colonic adenoma status post polypectomy.  Currently he is been continued on IV 

Protonix 40 mg twice a day


His been diagnosed with left upper lobe pneumonia, he is been followed closely 

by pulmonary team and placed on Zithromax and ceftriaxone.


Patient states that his breathing is better today.  No significant cough or 

exertional dyspnea.  Complaining of from bilateral leg redness stating that they

are improving, he still has redness swelling and tenderness and warmth on the 

right leg with some scaling on both legs which looks chronic.


He is on home oxygen and 2 L milliliters per hour


The correct with Zithromax and ceftriaxone as well as prednisone 40 mg.


His hyponatremia is improving and today sodium is 134.  Diabetic gallstone 

disease evaluated by surgery team


.














Objective





- Vital Signs


Vital signs: 


                                   Vital Signs











Temp  98.7 F   09/06/21 08:00


 


Pulse  76   09/06/21 11:36


 


Resp  18   09/06/21 11:34


 


BP  105/66   09/06/21 11:34


 


Pulse Ox  100   09/06/21 11:34








                                 Intake & Output











 09/05/21 09/06/21 09/06/21





 18:59 06:59 18:59


 


Intake Total 2078 240 0


 


Output Total 475 500 


 


Balance 1603 -260 0


 


Weight  133.1 kg 


 


Intake:   


 


  Oral 2078 240 0


 


Output:   


 


  Urine 475 500 


 


Other:   


 


  Voiding Method  Toilet 





  Urinal 


 


  # Voids   2


 


  # Bowel Movements  2 














- Exam





GENERAL: The patient is alert and oriented x3, not in any acute distress. Well 

developed, well nourished. 


HEENT: Pupils are round and equally reacting to light. EOMI. No scleral icterus.

No conjunctival pallor. Normocephalic, atraumatic. No pharyngeal erythema. No th

yromegaly. 


CARDIOVASCULAR: S1 and S2 present. No murmurs, rubs, or gallops. 


PULMONARY: Chest is clear to auscultation, no wheezing or crackles. 


ABDOMEN: Soft, nontender, nondistended, normoactive bowel sounds. No palpable 

organomegaly. 


MUSCULOSKELETAL: No joint swelling or deformity. 


EXTREMITIES: No cyanosis, clubbing, or pedal edema.  Bilateral leg scaling, 

right leg is red and swollen and warm


NEUROLOGICAL: Gross neurological examination did not reveal any focal deficits. 


SKIN: No rashes. no petechiae.





- Labs


CBC & Chem 7: 


                                 09/06/21 08:10





                                 09/06/21 08:10


Labs: 


                  Abnormal Lab Results - Last 24 Hours (Table)











  09/06/21 09/06/21 09/06/21 Range/Units





  06:01 08:10 08:10 


 


RBC   3.50 L   (4.30-5.90)  m/uL


 


Hgb   10.9 L   (13.0-17.5)  gm/dL


 


Hct   32.8 L   (39.0-53.0)  %


 


Sodium    134 L  (137-145)  mmol/L


 


Potassium    3.4 L  (3.5-5.1)  mmol/L


 


Chloride    91 L  ()  mmol/L


 


Carbon Dioxide    39 H  (22-30)  mmol/L


 


Glucose    121 H  (74-99)  mg/dL


 


POC Glucose (mg/dL)  131 H    (75-99)  mg/dL














  09/06/21 Range/Units





  12:04 


 


RBC   (4.30-5.90)  m/uL


 


Hgb   (13.0-17.5)  gm/dL


 


Hct   (39.0-53.0)  %


 


Sodium   (137-145)  mmol/L


 


Potassium   (3.5-5.1)  mmol/L


 


Chloride   ()  mmol/L


 


Carbon Dioxide   (22-30)  mmol/L


 


Glucose   (74-99)  mg/dL


 


POC Glucose (mg/dL)  124 H  (75-99)  mg/dL








                      Microbiology - Last 24 Hours (Table)











 09/03/21 21:26 Blood Culture - Preliminary





 Blood    No Growth after 48 hours


 


 09/03/21 21:10 Blood Culture - Preliminary





 Blood    No Growth after 48 hours














Assessment and Plan


Assessment: 





Assessment: 





1.  Acute lower GI bleed; status post colonoscopy showing diverticulosis, 

internal hemorrhoids grade 1 status post polypectomy


- We will monitor H&H closely; Protonix 40 mg IV daily; we will plan to type 

crossmatch and transfuse if hemoglobin is less than 8.0


- Hemoglobin results.





2.  Acute hypoxemic respiratory failure; multifactorial; secondary to CPAP, 

acute exacerbation COPD.





3.  Community-acquired pneumonia


- Continue on ceftriaxone and azithromycin


- Pulmonary team on the case





4.  Acute exacerbation COPD; continue with DuoNeb nebulizer treatments along 

with Pulmicort and Perforomist inhalations; IV Solu-Medrol


- Patient is on antibiotic therapy with Rocephin and ceftriaxone


- Pulmonary team on the case





5.  Right leg cellulitis.  Likely on Rocephin, infectious disease team is 

consulted





6.  Diabetes mellitus; monitor Accu-Cheks every before meals and at bedtime with

insulin sliding scale





7.  Osteoarthritis; chronic left shoulder pain; patient currently taking 

Naprosyn twice a day dose 





8. Diastolic CHF; not in exacerbation; patient remains on diuretic therapy with 

Lasix and Zaroxolyn





DVT prophylaxis; SCDs only due to GI bleed


CODE STATUS; full code

## 2021-09-06 NOTE — P.PCN
Date of Procedure: 09/06/21


Description of Procedure: 











PREOPERATIVE DIAGNOSIS:


Gastrointestinal bleeding with melena


Congestive heart failure, acute exacerbation


Chronic obstructive pulmonary disease, acute exacerbation


Acute blood loss anemia


Morbid obesity due to excess calories, BMI 37.7





POSTOPERATIVE DIAGNOSIS:


Gastrointestinal bleeding with melena


Congestive heart failure, acute exacerbation


Chronic obstructive pulmonary disease, acute exacerbation


Acute blood loss anemia


Morbid obesity due to excess calories, BMI 37.7





OPERATION:


Esophagogastroduodenoscopy 





SURGEON: Michelle Saldaña MD





ANESTHESIA: MAC.





INDICATIONS:


The patient is a 60-year-old male who presents with gastrointestinal bleeding 

with melena.  Upper endoscopy was offered part of his anemia workup.  Benefits 

and risks of the procedure were described. Informed consent was obtained.





DESCRIPTION:


The patient was brought into the endoscopy suite and laid in the left lateral 

decubitus position. An Olympus gastroscope was passed along the posterior 

oropharynx down to the distal esophagus where the squamocolumnar junction was 

encountered at 43 cm from the incisors. The stomach was entered and bile reflux 

was found.  Additional findings are listed below.The first through third portion

of the duodenum was examined and unremarkable.  Less than 3 mm small AV 

malformation at the third portion of the duodenum was identified without active 

bleeding.  Retroflexion of the scope confirmed  Hill grade 2 lower esophageal 

valve. The squamocolumnar junction demonstrated LA grade B erosive esophagitis. 

The stomach was desufflated. The patient tolerated the procedure well.





FINDINGS:


Squamocolumnar junction 43 cm from the incisors.


Diaphragmatic hiatus at 43 cm.


Hill grade 2 lower esophageal valve.


LA grade B erosive esophagitis.


No active duodenitis.


No duodenal ulcers


No gastric ulcer


Small AVM 3 mm at the third portion of duodenum without active bleeding





RECOMMENDATIONS:


1. Upper endoscopy as needed.


2. Proceed with colonoscopy

## 2021-09-06 NOTE — P.PN
Subjective


Progress Note Date: 09/06/21








CHIEF COMPLAINT: Hematochezia





HISTORY OF PRESENT ILLNESS: The patient is a 60 year old male is admitted to the

emergency room with concern for gastrointestinal bleeding including acute 

exacerbation of chronic obstructive pulmonary disease.  Patient reports no 

further bleeding last night.  Denies abdominal pain.





REVIEW OF ORGAN SYSTEMS: Has shortness of breath. No active chest pain.  No 

fevers or chills.








PHYSICAL EXAM:


VITALS: Reviewed


CONSTITUTIONAL:  Well developed and in no acute distress. 


EYES:  Conjuctivae without sclera icterus.  Extraocular movements grossly 

intact. 


HEAD, EARS, NOSE, THROAT: Moist buccal mucosa. Head is atraumatic, 

normocephalic. Hears conversational speech. No nasal drainage. 


RESPIRATORY:  Labored respirations and equal bilateral excursions. No gross 

wheezes. 


CARDIOVASCULAR:  Regular rate and rhythm.  


ABDOMEN:  Nontender.


MUSCULOSKELETAL:  Nail and fingers with good capillary refill.


SKIN:  Warm and well perfused with good skin turgor.


NEUROLOGIC: Cranial nerves II through XII grossly intact. Sensation upper and 

extremities intact. No focal or lateralizing signs. 


PSYCH:  Appropriate affect.  Alert and oriented to person, place and time. 

Displays appropriate insight.





CLINCAL LABS: Reviewed. Hemoglobin down 11.7 to 10.9.  Hemoglobin dropped 4 g 

since admission from 14.5.





ASSESSMENT: 


1.  Gastrointestinal bleeding 


2.  Congestive heart failure with pneumonia


3.  Hyponatremia 


4.  Lactic acidosis on admission


5.  Pneumonia


6.  Umbilical hernia


7.  Gallstones


8.  Acute blood loss anemia





PLAN:


1.  Will proceed with upper and lower endoscopy due to persistent anemia 


2.  Benefits and risks described.  





Objective





- Vital Signs


Vital signs: 


                                   Vital Signs











Temp  98.7 F   09/06/21 08:00


 


Pulse  76   09/06/21 11:36


 


Resp  18   09/06/21 11:34


 


BP  105/66   09/06/21 11:34


 


Pulse Ox  100   09/06/21 11:34








                                 Intake & Output











 09/05/21 09/06/21 09/06/21





 18:59 06:59 18:59


 


Intake Total 2078 240 


 


Output Total 475 500 


 


Balance 1603 -260 


 


Weight  133.1 kg 


 


Intake:   


 


  Oral 2078 240 


 


Output:   


 


  Urine 475 500 


 


Other:   


 


  Voiding Method  Toilet 





  Urinal 


 


  # Voids   2


 


  # Bowel Movements  2 














- Labs


CBC & Chem 7: 


                                 09/06/21 08:10





                                 09/06/21 08:10


Labs: 


                  Abnormal Lab Results - Last 24 Hours (Table)











  09/06/21 09/06/21 09/06/21 Range/Units





  06:01 08:10 08:10 


 


RBC   3.50 L   (4.30-5.90)  m/uL


 


Hgb   10.9 L   (13.0-17.5)  gm/dL


 


Hct   32.8 L   (39.0-53.0)  %


 


Sodium    134 L  (137-145)  mmol/L


 


Potassium    3.4 L  (3.5-5.1)  mmol/L


 


Chloride    91 L  ()  mmol/L


 


Carbon Dioxide    39 H  (22-30)  mmol/L


 


Glucose    121 H  (74-99)  mg/dL


 


POC Glucose (mg/dL)  131 H    (75-99)  mg/dL














  09/06/21 Range/Units





  12:04 


 


RBC   (4.30-5.90)  m/uL


 


Hgb   (13.0-17.5)  gm/dL


 


Hct   (39.0-53.0)  %


 


Sodium   (137-145)  mmol/L


 


Potassium   (3.5-5.1)  mmol/L


 


Chloride   ()  mmol/L


 


Carbon Dioxide   (22-30)  mmol/L


 


Glucose   (74-99)  mg/dL


 


POC Glucose (mg/dL)  124 H  (75-99)  mg/dL








                      Microbiology - Last 24 Hours (Table)











 09/03/21 21:26 Blood Culture - Preliminary





 Blood    No Growth after 48 hours


 


 09/03/21 21:10 Blood Culture - Preliminary





 Blood    No Growth after 48 hours














Assessment and Plan


(1) CAP (community acquired pneumonia)


Current Visit: Yes   Status: Acute   Code(s): J18.9 - PNEUMONIA, UNSPECIFIED 

ORGANISM   SNOMED Code(s): 957103676


   





(2) Hematochezia


Current Visit: Yes   Status: Acute   Code(s): K92.1 - MELENA   SNOMED Code(s): 

499757147


   





(3) Morbid obesity with BMI of 40.0-44.9, adult


Current Visit: No   Status: Acute   Code(s): E66.01 - MORBID (SEVERE) OBESITY 

DUE TO EXCESS CALORIES   SNOMED Code(s): 000490680


   





(4) Type 2 diabetes mellitus


Current Visit: No   Status: Acute   Code(s): E11.9 - TYPE 2 DIABETES MELLITUS 

WITHOUT COMPLICATIONS   SNOMED Code(s): 01692507


   





(5) Umbilical hernia


Current Visit: Yes   Status: Acute   Code(s): K42.9 - UMBILICAL HERNIA WITHOUT 

OBSTRUCTION OR GANGRENE   SNOMED Code(s): 606009744


   





(6) Diverticulosis


Current Visit: Yes   Status: Acute   Code(s): K57.90 - DVRTCLOS OF INTEST, PART 

UNSP, W/O PERF OR ABSCESS W/O BLEED   SNOMED Code(s): 572286944


   





(7) Acute blood loss anemia


Current Visit: Yes   Status: Acute   Code(s): D62 - ACUTE POSTHEMORRHAGIC ANEMIA

  SNOMED Code(s): 810684941


   





(8) Gallstones


Current Visit: Yes   Status: Acute   Code(s): K80.20 - CALCULUS OF GALLBLADDER 

W/O CHOLECYSTITIS W/O OBSTRUCTION   SNOMED Code(s): 827554781

## 2021-09-06 NOTE — P.CONS
History of Present Illness





- Reason for Consult


Consult date: 09/06/21


Pneumonia and cellulitis


Requesting physician: Jordon E Sheet





- Chief Complaint


bleeding per rectum x 1 day





- History of Present Illness


History of present illness : Patient is 60-year-old  male presenting to

the ER on 9/3/2021 for evaluation of bright red blood per rectum patient denies 

having any abdominal pain patient denies any nausea no vomiting patient did have

underlying COPD however the patient denies having any chest pain shortness of 

breath or cough patient on presentation to the hospital was afebrile patient did

have mild elevated white count on admission of 12.7 that has subsequently 

normalized kidney function has been normal lactic acid was elevated on admission

subsequent normalized stool for occult blood was positive blood culture has been

negative patient did have a chest x-ray concern for right lower lobe pneumonia 

patient has been evaluated by pulmonary currently being treated with Omnicef and

Zithromax patient did have a CT abdominal pelvis lung bases were clear minimal 

atelectasis infectious disease was consulted today with concern for possible 

pneumonia and cellulitis patient currently denies having any redness to lower 

extremity did have mild swelling, patient mention he to follow-up in the wound 

care center for lower extremity wound however mention he does not have any 

problem with his leg wound at this point and refused further dressing to be 

taken off








Review of system:


 CONSTITUTIONAL:  Positive for weakness however no fever.


EYES:  No complaint.


ENT:  No complaint.


RESPIRATORY: As per history of present illness.


CARDIOVASCULAR:  No complaint.


GENITOURINARY:  No complaint.


GASTROINTESTINAL: As per history of present illness MUSCULOSKELETAL: No 

complaint.


INTEGUMENTARY: No complaint.


PSYCHOLOGIC: No complaint.


ENDOCRINE: No complaint.


NEUROLOGIC:  No complaint.








Past medical history : Reviewed, documented below


Past surgical history : Reviewed, documented below


Social history: Reviewed, documented below


Medications: Reviewed, as documented below








EXAMINATION:


Vital sigans=  Reviewed and documented below


GENERAL DESCRIPTION: Middle-aged male lying in bed, no distress. No tachypnea or

accessory muscle of respiration use.


HEENT: Shows Pallor , no scleral icterus. Oral mucous membrane is dry.


NECK: Trachea central, no thyromegaly.


LUNGS: Unlabored breathing.  Decrease intensity of breath sounds. No wheeze or 

crackle.


HEART: S1, S2, regular rate and rhythm.


ABDOMEN: Soft, no tenderness , guarding or rigidity


EXTREMITIES: Legs are currently wrapped no drainage on the dressing.


SKIN: No rash, no masses palpable.


NEUROLOGICAL: The patient is awake, alert, oriented x3, mood and affect normal.





LABS AND RADIOLOGY: Reviewed results see below





Assessment : 1-patient presented to hospital with acute hematochezia in this 

patient who is status post colonoscopy with evidence of diverticulosis and sigmo

id colon adenoma no evidence of any active bleeding, patient did have underlying

COPD and did have some abnormality on the chest x-ray however CT did not show 

any evidence of consolidation and the patient is clinically not behaving as 

pneumonia with no fever or elevated white count,


2-patient did have a single wound however mention his wounds are healing well 

and refused for the dressing to be taken off limiting evaluation








Plan: 1-we will check a CRP and procalcitonin


2-May continue Omnicef and Zithromax however if procalcitonin is normal 

recommend to discontinue antibiotics 





We will follow on clinical condition and cultures to further adjust medication 

if needed


Thank you for this consultation we will follow the patient along with you








Past Medical History


Past Medical History: Heart Failure, COPD, Diabetes Mellitus


History of Any Multi-Drug Resistant Organisms: MRSA


Year Discovered:: 6/23/16


MDRO Source:: Left Foot


Past Surgical History: Hernia Repair, Orthopedic Surgery, Tonsillectomy


Additional Past Surgical History / Comment(s): lt. 5th toe amp


Past Psychological History: No Psychological Hx Reported


Smoking Status: Current every day smoker


Past Alcohol Use History: None Reported


Past Drug Use History: None Reported


Additional Drug Use History / Comment(s): quit drinking 10 years ago





- Past Family History


  ** Sister(s)


Family Medical History: Cancer





  ** Father


Family Medical History: Congestive Heart Failure (CHF), Diabetes Mellitus





  ** Mother


Family Medical History: Renal Disease





Medications and Allergies


                                Home Medications











 Medication  Instructions  Recorded  Confirmed  Type


 


Furosemide [Lasix] 40 mg PO BID 07/19/16 09/03/21 History


 


Sulfamethox-Tmp 800-160Mg [Bactrim 1 tab PO Q12HR 07/19/16 09/03/21 History





-160 mg]    


 


Acetaminophen Tab [Tylenol Tab] 1,000 mg PO Q6HR PRN 09/03/21 09/03/21 History


 


Albuterol Sulfate [Ventolin HFA] 1 - 2 puff INHALATION RT-Q6H PRN 09/03/21 09/03/21 History


 


Naproxen 500 mg PO BID PRN 09/03/21 09/03/21 History


 


Oxymetazoline HCl [Afrin 0.05%] 1 spray EA NOSTRIL DAILY PRN 09/03/21 09/03/21 

History


 


metOLazone [Zaroxolyn] 5 mg PO DAILY 09/03/21 09/03/21 History








                                    Allergies











Allergy/AdvReac Type Severity Reaction Status Date / Time


 


No Known Allergies Allergy   Verified 09/03/21 18:29














Physical Exam


Vitals: 


                                   Vital Signs











  Temp Pulse Pulse Resp BP Pulse Ox


 


 09/06/21 20:13   80    


 


 09/06/21 20:00   76    


 


 09/06/21 19:59   76    


 


 09/06/21 19:47   72    


 


 09/06/21 15:35  98.8 F   97  18  100/64  90 L


 


 09/06/21 15:25   80    


 


 09/06/21 15:13   80    


 


 09/06/21 14:00    82  18  


 


 09/06/21 11:36   76    


 


 09/06/21 11:34    82  18  105/66  100


 


 09/06/21 11:25   72    


 


 09/06/21 08:30   84    


 


 09/06/21 08:24   88    


 


 09/06/21 08:23   88    


 


 09/06/21 08:08   84    


 


 09/06/21 08:00  98.7 F   80  18  124/76  100


 


 09/06/21 03:48  97.8 F   84  20  135/76  99


 


 09/06/21 00:00  98.1 F   80  18  132/77  93 L








                                Intake and Output











 09/06/21 09/06/21 09/07/21





 14:59 22:59 06:59


 


Intake Total 500 240 


 


Balance 500 240 


 


Intake:   


 


    


 


  Oral 0 240 


 


Other:   


 


  # Voids 2 1 














Results


CBC & Chem 7: 


                                 09/06/21 08:10





                                 09/06/21 08:10


Labs: 


                  Abnormal Lab Results - Last 24 Hours (Table)











  09/06/21 09/06/21 09/06/21 Range/Units





  06:01 08:10 08:10 


 


RBC   3.50 L   (4.30-5.90)  m/uL


 


Hgb   10.9 L   (13.0-17.5)  gm/dL


 


Hct   32.8 L   (39.0-53.0)  %


 


Sodium    134 L  (137-145)  mmol/L


 


Potassium    3.4 L  (3.5-5.1)  mmol/L


 


Chloride    91 L  ()  mmol/L


 


Carbon Dioxide    39 H  (22-30)  mmol/L


 


Glucose    121 H  (74-99)  mg/dL


 


POC Glucose (mg/dL)  131 H    (75-99)  mg/dL














  09/06/21 09/06/21 09/06/21 Range/Units





  12:04 16:38 20:08 


 


RBC     (4.30-5.90)  m/uL


 


Hgb     (13.0-17.5)  gm/dL


 


Hct     (39.0-53.0)  %


 


Sodium     (137-145)  mmol/L


 


Potassium     (3.5-5.1)  mmol/L


 


Chloride     ()  mmol/L


 


Carbon Dioxide     (22-30)  mmol/L


 


Glucose     (74-99)  mg/dL


 


POC Glucose (mg/dL)  124 H  179 H  183 H  (75-99)  mg/dL








                      Microbiology - Last 24 Hours (Table)











 09/03/21 21:10 Blood Culture - Preliminary





 Blood    No Growth after 72 hours


 


 09/03/21 21:26 Blood Culture - Preliminary





 Blood    No Growth after 72 hours

## 2021-09-07 LAB
ANION GAP SERPL CALC-SCNC: 7 MMOL/L
BASOPHILS # BLD AUTO: 0 K/UL (ref 0–0.2)
BASOPHILS NFR BLD AUTO: 0 %
BUN SERPL-SCNC: 13 MG/DL (ref 9–20)
CALCIUM SPEC-MCNC: 9.2 MG/DL (ref 8.4–10.2)
CHLORIDE SERPL-SCNC: 95 MMOL/L (ref 98–107)
CO2 SERPL-SCNC: 32 MMOL/L (ref 22–30)
EOSINOPHIL # BLD AUTO: 0 K/UL (ref 0–0.7)
EOSINOPHIL NFR BLD AUTO: 0 %
ERYTHROCYTE [DISTWIDTH] IN BLOOD BY AUTOMATED COUNT: 3.37 M/UL (ref 4.3–5.9)
ERYTHROCYTE [DISTWIDTH] IN BLOOD: 14.4 % (ref 11.5–15.5)
GLUCOSE BLD-MCNC: 141 MG/DL (ref 75–99)
GLUCOSE BLD-MCNC: 194 MG/DL (ref 75–99)
GLUCOSE BLD-MCNC: 262 MG/DL (ref 75–99)
GLUCOSE BLD-MCNC: 313 MG/DL (ref 75–99)
GLUCOSE SERPL-MCNC: 139 MG/DL (ref 74–99)
HBA1C MFR BLD: 6.6 % (ref 4–6)
HCT VFR BLD AUTO: 31.9 % (ref 39–53)
HGB BLD-MCNC: 10.6 GM/DL (ref 13–17.5)
LYMPHOCYTES # SPEC AUTO: 1.7 K/UL (ref 1–4.8)
LYMPHOCYTES NFR SPEC AUTO: 20 %
MAGNESIUM SPEC-SCNC: 1.7 MG/DL (ref 1.6–2.3)
MCH RBC QN AUTO: 31.5 PG (ref 25–35)
MCHC RBC AUTO-ENTMCNC: 33.3 G/DL (ref 31–37)
MCV RBC AUTO: 94.8 FL (ref 80–100)
MONOCYTES # BLD AUTO: 0.5 K/UL (ref 0–1)
MONOCYTES NFR BLD AUTO: 6 %
NEUTROPHILS # BLD AUTO: 6 K/UL (ref 1.3–7.7)
NEUTROPHILS NFR BLD AUTO: 71 %
PLATELET # BLD AUTO: 418 K/UL (ref 150–450)
POTASSIUM SERPL-SCNC: 3.9 MMOL/L (ref 3.5–5.1)
SODIUM SERPL-SCNC: 134 MMOL/L (ref 137–145)
WBC # BLD AUTO: 8.4 K/UL (ref 3.8–10.6)

## 2021-09-07 RX ADMIN — INSULIN ASPART SCH UNIT: 100 INJECTION, SOLUTION INTRAVENOUS; SUBCUTANEOUS at 17:26

## 2021-09-07 RX ADMIN — CEFAZOLIN SCH: 330 INJECTION, POWDER, FOR SOLUTION INTRAMUSCULAR; INTRAVENOUS at 02:27

## 2021-09-07 RX ADMIN — HYDROCORTISONE SCH APPLIC: 25 CREAM TOPICAL at 20:26

## 2021-09-07 RX ADMIN — ACETAMINOPHEN PRN MG: 500 TABLET ORAL at 23:09

## 2021-09-07 RX ADMIN — PANTOPRAZOLE SODIUM SCH: 40 TABLET, DELAYED RELEASE ORAL at 02:27

## 2021-09-07 RX ADMIN — BUDESONIDE SCH MG: 1 SUSPENSION RESPIRATORY (INHALATION) at 07:46

## 2021-09-07 RX ADMIN — IPRATROPIUM BROMIDE AND ALBUTEROL SULFATE SCH ML: .5; 3 SOLUTION RESPIRATORY (INHALATION) at 11:34

## 2021-09-07 RX ADMIN — AZITHROMYCIN SCH MG: 500 TABLET, FILM COATED ORAL at 08:11

## 2021-09-07 RX ADMIN — FORMOTEROL FUMARATE DIHYDRATE SCH MCG: 20 SOLUTION RESPIRATORY (INHALATION) at 07:46

## 2021-09-07 RX ADMIN — INSULIN ASPART SCH UNIT: 100 INJECTION, SOLUTION INTRAVENOUS; SUBCUTANEOUS at 12:01

## 2021-09-07 RX ADMIN — IPRATROPIUM BROMIDE AND ALBUTEROL SULFATE SCH ML: .5; 3 SOLUTION RESPIRATORY (INHALATION) at 19:55

## 2021-09-07 RX ADMIN — INSULIN ASPART SCH UNIT: 100 INJECTION, SOLUTION INTRAVENOUS; SUBCUTANEOUS at 20:26

## 2021-09-07 RX ADMIN — IPRATROPIUM BROMIDE AND ALBUTEROL SULFATE SCH ML: .5; 3 SOLUTION RESPIRATORY (INHALATION) at 07:46

## 2021-09-07 RX ADMIN — NICOTINE SCH PATCH: 14 PATCH, EXTENDED RELEASE TRANSDERMAL at 08:09

## 2021-09-07 RX ADMIN — CEFAZOLIN SCH: 330 INJECTION, POWDER, FOR SOLUTION INTRAMUSCULAR; INTRAVENOUS at 20:14

## 2021-09-07 RX ADMIN — BUDESONIDE SCH MG: 1 SUSPENSION RESPIRATORY (INHALATION) at 19:55

## 2021-09-07 RX ADMIN — INSULIN ASPART SCH: 100 INJECTION, SOLUTION INTRAVENOUS; SUBCUTANEOUS at 05:47

## 2021-09-07 RX ADMIN — PANTOPRAZOLE SODIUM SCH MG: 40 TABLET, DELAYED RELEASE ORAL at 08:11

## 2021-09-07 RX ADMIN — FORMOTEROL FUMARATE DIHYDRATE SCH MCG: 20 SOLUTION RESPIRATORY (INHALATION) at 19:56

## 2021-09-07 RX ADMIN — PANTOPRAZOLE SODIUM SCH MG: 40 TABLET, DELAYED RELEASE ORAL at 20:26

## 2021-09-07 RX ADMIN — HYDROCORTISONE SCH APPLIC: 25 CREAM TOPICAL at 14:11

## 2021-09-07 RX ADMIN — IPRATROPIUM BROMIDE AND ALBUTEROL SULFATE SCH ML: .5; 3 SOLUTION RESPIRATORY (INHALATION) at 15:57

## 2021-09-07 NOTE — P.PN
Subjective





60-year-old male patient who follows with Dr. Arora as his primary care 

provider.  He has history of diabetes mellitus, diastolic congestive heart 

failure, chronic obstructive pulmonary disease, chronic and ongoing tobacco 

dependence.  He presented to the emergency room last evening with reports of 

bright red blood per rectum.  He had approximate 4 episode yesterday.  No 

previous history of GI bleed.  Denies excessive alcohol use.  His been taking 

Naprosyn twice daily for left shoulder pain.  Chest x-ray revealed some mild 

infiltrates in the right lower lobe and left upper lobe.  We're consulted for 

the same.  He is seen in the emergency room.  Currently sitting up on the 

stretcher.  Awake and alert in no acute distress.  He is requiring 5 L high flow

nasal cannula to maintain O2 saturations in the 90s.  He does have home oxygen. 

He has a nebulizer with albuterol treatments.  He had not been seen by 

pulmonologist in the past.  He has been smoking for approximately 42 years.  

White count 8.5.  Hemoglobin 12.0.  Sodium 129.  Potassium 4.5.  Bicarb 35.  

Creatinine 1.15.  Initial lactic 2.9.  Currently 1.5.  ProBNP 43.  Stool for 

occult blood positive. 





09/05/2021


Patient is seen and evaluated in room with family members at bedside; report 

some improvement in breathing; has been evaluated by general surgery and is 

recommended colonoscopy


Vital signs remained stable temperature 97.9, pulse 80, respiration 18 and blood

pressure 130/73; lab review shows WBC 8.4, hemoglobin 11.7, platelet count of 39

0,000, sodium 131, potassium 3.9, BUN/creatinine of 20/0.85 with lactic acid 

levels of 1.5; chest x-ray reveals right lower lobe and left upper lobe 

pneumonia


Pulmonary is following and recommending to continue with current IV antibiotic 

therapy in form of ceftriaxone and azithromycin; Pulmicort, formoterol, 

albuterol sulfate and prednisone has been added for acute exacerbation COPD


Patient has been evaluated by general surgery and is recommended upper and lower

endoscopy; given risk for CHF patient will receive low-volume prep; continue 

monitor CBC








Subjective:


9/6/21


This is a pleasant 60 years old male who presents with bright red blood per 

rectum on 9/3 and has been evaluated by surgery team and he underwent 

colonoscopy today showing internal hemorrhoids with sigmoid diverticulosis and 

colonic adenoma status post polypectomy.  Currently he is been continued on IV 

Protonix 40 mg twice a day


His been diagnosed with left upper lobe pneumonia, he is been followed closely 

by pulmonary team and placed on Zithromax and ceftriaxone.


Patient states that his breathing is better today.  No significant cough or 

exertional dyspnea.  Complaining of from bilateral leg redness stating that they

are improving, he still has redness swelling and tenderness and warmth on the 

right leg with some scaling on both legs which looks chronic.


He is on home oxygen and 2 L milliliters per hour


The correct with Zithromax and ceftriaxone as well as prednisone 40 mg.


His hyponatremia is improving and today sodium is 134.  Diabetic gallstone 

disease evaluated by surgery team








09/07/2021


Reason well, he still have some mild dyspnea with mild wheezing breath no chest 

pain.  Patient was talking freely on room air today although he's I used 2 L/m 

of oxygen via nasal cannula at home.  I then that he is hemodynamically stable, 

labs are stable and his hemoglobin stable at 10.6.


Refused infectious disease team to check his right leg and he was to follow up 

as an outpatient


Meter team switched his antibiotics to oral pills of Zithromax and cefdionir, 


He has some episodes of blood per rectum 2, most likely secondary to 

hemorrhoids. hydrocortisone cream was ordered.


Polyp biopsy is pending, hemoglobin A1c is pending


Possible discharge in 24-48 hours if remains stable and his hemoglobin is stable








Objective





- Vital Signs


Vital signs: 


                                   Vital Signs











Temp  98 F   09/07/21 11:30


 


Pulse  80   09/07/21 12:48


 


Resp  16   09/07/21 12:48


 


BP  133/78   09/07/21 11:30


 


Pulse Ox  94 L  09/07/21 11:30








                                 Intake & Output











 09/06/21 09/07/21 09/07/21





 18:59 06:59 18:59


 


Intake Total 740  740


 


Output Total   300


 


Balance 740  440


 


Weight  132.9 kg 


 


Intake:   


 


    


 


  Oral 240  740


 


Output:   


 


  Stool   300


 


Other:   


 


  Voiding Method  Toilet Toilet





  Urinal Urinal


 


  # Voids 2 1 














- Exam





GENERAL: The patient is alert and oriented x3, not in any acute distress. Well 

developed, well nourished. 


HEENT: Pupils are round and equally reacting to light. EOMI. No scleral icterus.

No conjunctival pallor. Normocephalic, atraumatic. No pharyngeal erythema. No 

thyromegaly. 


CARDIOVASCULAR: S1 and S2 present. No murmurs, rubs, or gallops. 


PULMONARY: Chest is clear to auscultation, no wheezing or crackles. 


ABDOMEN: Soft, nontender, nondistended, normoactive bowel sounds. No palpable 

organomegaly. 


MUSCULOSKELETAL: No joint swelling or deformity. 


EXTREMITIES: No cyanosis, clubbing, or pedal edema.  Bilateral leg scaling, 

right leg is red and swollen and warm


NEUROLOGICAL: Gross neurological examination did not reveal any focal deficits. 


SKIN: No rashes. no petechiae.





- Labs


CBC & Chem 7: 


                                 09/07/21 09:07





                                 09/07/21 09:07


Labs: 


                  Abnormal Lab Results - Last 24 Hours (Table)











  09/06/21 09/06/21 09/07/21 Range/Units





  16:38 20:08 06:02 


 


RBC     (4.30-5.90)  m/uL


 


Hgb     (13.0-17.5)  gm/dL


 


Hct     (39.0-53.0)  %


 


Sodium     (137-145)  mmol/L


 


Chloride     ()  mmol/L


 


Carbon Dioxide     (22-30)  mmol/L


 


Glucose     (74-99)  mg/dL


 


POC Glucose (mg/dL)  179 H  183 H  141 H  (75-99)  mg/dL














  09/07/21 09/07/21 09/07/21 Range/Units





  09:07 09:07 11:28 


 


RBC  3.37 L    (4.30-5.90)  m/uL


 


Hgb  10.6 L    (13.0-17.5)  gm/dL


 


Hct  31.9 L    (39.0-53.0)  %


 


Sodium   134 L   (137-145)  mmol/L


 


Chloride   95 L   ()  mmol/L


 


Carbon Dioxide   32 H   (22-30)  mmol/L


 


Glucose   139 H   (74-99)  mg/dL


 


POC Glucose (mg/dL)    194 H  (75-99)  mg/dL








                      Microbiology - Last 24 Hours (Table)











 09/03/21 21:10 Blood Culture - Preliminary





 Blood    No Growth after 72 hours


 


 09/03/21 21:26 Blood Culture - Preliminary





 Blood    No Growth after 72 hours














Assessment and Plan


Assessment: 





Assessment: 





1.  Acute lower GI bleed; status post colonoscopy showing diverticulosis, intern

al hemorrhoids grade 1 status post polypectomy


- We will monitor H&H closely; Protonix 40 mg IV daily; we will plan to type 

crossmatch and transfuse if hemoglobin is less than 8.0


- Hemoglobin results.


- Could be due to internal hemorrhoids,start hydrocortisone topical





2.  Acute hypoxemic respiratory failure; multifactorial; secondary to CPAP, 

acute exacerbation COPD.





3.  Community-acquired pneumonia


- Continue on ceftriaxone and azithromycin


- Pulmonary team on the case





4.  Acute exacerbation COPD; continue with DuoNeb nebulizer treatments along 

with Pulmicort and Perforomist inhalations; IV Solu-Medrol


- Patient is on antibiotic therapy with Rocephin and ceftriaxone


- Pulmonary team on the case





5.  Right leg cellulitis.  Likely on Rocephin, infectious disease team is 

consulted





6.  Diabetes mellitus; monitor Accu-Cheks every before meals and at bedtime with

insulin sliding scale





7.  Osteoarthritis; chronic left shoulder pain; patient currently taking 

Naprosyn twice a day dose 





8. Diastolic CHF; not in exacerbation; patient remains on diuretic therapy with 

Lasix and Zaroxolyn





DVT prophylaxis; SCDs only due to GI bleed


CODE STATUS; full code

## 2021-09-07 NOTE — P.PN
<Cherelle Mary - Last Filed: 09/07/21 11:05>





Subjective


Progress Note Date: 09/07/21





CHIEF COMPLAINT: GI bleed





HISTORY OF PRESENT ILLNESS: Patient presented with blood in stools including a 4

g drop in hemoglobin during his hospitalization.  He is status post colonoscopy 

with snare of polypectomy.  Results showed sigmoid diverticulosis with 

pandiverticulosis and sigmoid colon adenoma.  Patient reporting no further blood

in stools.  Denies any abdominal pain.  He still reports some shortness of 

breath.  Afebrile.  WBC is 8.4 hemoglobin 10.6





PHYSICAL EXAM: 


VITAL SIGNS: Reviewed


GENERAL: Well-developed in no acute distress. 


HEENT:  No sclera icterus. Extraocular movements grossly intact.  Moist buccal 

mucosa. 


Head is atraumatic, normocephalic. Hears conversational speech. No nasal 

drainage.


NECK:  Supple without lymphadenopathy.


CHEST:  Non-labored respirations and equal bilateral excursions. 


CARDIOVASCULAR:   Palpable 2+ radial pulses.


ABDOMEN:  Soft.  Nondistended. Nontender.


MUSCULOSKELETAL:  No clubbing or cyanosis.


NEUROLOGIC:  No focal or lateralizing signs.  Cranial nerves II through XII 

grossly intact.


PSYCH:  Appropriate affect.  Alert and oriented to person, place and time.


SKIN: Well perfused.  Good skin turgor. 





ASSESSMENT: 


1.  Acute GI bleed


2.  Acute blood loss anemia


3.  Status post colonoscopy with polypectomy.  Results showed sigmoid 

diverticulosis with pandiverticulosis and sigmoid colon adenoma


4.  Acute congestive heart failure with exacerbation


5.  Acute chronic obstructive pulmonary disease with exacerbation





PLAN: 


-Recommend repeat lower endoscopy in 2 years, 2023


-Continue regular diet


-Patient is okay for discharge from surgical standpoint when cleared medically





Physician Assistant note has been reviewed by physician. Signing provider agrees

with the documented findings, assessment, and plan of care. 





Objective





- Vital Signs


Vital signs: 


                                   Vital Signs











Temp  98 F   09/07/21 07:17


 


Pulse  85   09/07/21 08:08


 


Resp  18   09/07/21 08:08


 


BP  148/73   09/07/21 07:17


 


Pulse Ox  95   09/07/21 07:47








                                 Intake & Output











 09/06/21 09/07/21 09/07/21





 18:59 06:59 18:59


 


Intake Total 740  240


 


Output Total   150


 


Balance 740  90


 


Weight  132.9 kg 


 


Intake:   


 


    


 


  Oral 240  240


 


Output:   


 


  Stool   150


 


Other:   


 


  Voiding Method  Toilet Toilet





  Urinal Urinal


 


  # Voids 2 1 














- Labs


CBC & Chem 7: 


                                 09/07/21 09:07





                                 09/07/21 09:07


Labs: 


                  Abnormal Lab Results - Last 24 Hours (Table)











  09/06/21 09/06/21 09/06/21 Range/Units





  12:04 16:38 20:08 


 


RBC     (4.30-5.90)  m/uL


 


Hgb     (13.0-17.5)  gm/dL


 


Hct     (39.0-53.0)  %


 


Sodium     (137-145)  mmol/L


 


Chloride     ()  mmol/L


 


Carbon Dioxide     (22-30)  mmol/L


 


Glucose     (74-99)  mg/dL


 


POC Glucose (mg/dL)  124 H  179 H  183 H  (75-99)  mg/dL














  09/07/21 09/07/21 09/07/21 Range/Units





  06:02 09:07 09:07 


 


RBC   3.37 L   (4.30-5.90)  m/uL


 


Hgb   10.6 L   (13.0-17.5)  gm/dL


 


Hct   31.9 L   (39.0-53.0)  %


 


Sodium    134 L  (137-145)  mmol/L


 


Chloride    95 L  ()  mmol/L


 


Carbon Dioxide    32 H  (22-30)  mmol/L


 


Glucose    139 H  (74-99)  mg/dL


 


POC Glucose (mg/dL)  141 H    (75-99)  mg/dL








                      Microbiology - Last 24 Hours (Table)











 09/03/21 21:10 Blood Culture - Preliminary





 Blood    No Growth after 72 hours


 


 09/03/21 21:26 Blood Culture - Preliminary





 Blood    No Growth after 72 hours














<Michelle Saldaña N - Last Filed: 09/08/21 11:29>





Subjective








CHIEF COMPLAINT: Hematochezia





HISTORY OF PRESENT ILLNESS: The patient is a 60 year old male with 

gastrointestinal bleeding status post upper and lower endoscopy.  He denies any 

further bleeding from his upper or lower endoscopy.  A polyp was removed donnell gonzalez.





REVIEW OF ORGAN SYSTEMS: Has shortness of breath. No active chest pain.  No 

fevers or chills.








PHYSICAL EXAM:


VITALS: Reviewed


CONSTITUTIONAL:  Well developed and in no acute distress. 


EYES:  Conjuctivae without sclera icterus.  Extraocular movements grossly 

intact. 


HEAD, EARS, NOSE, THROAT: Moist buccal mucosa. Head is atraumatic, 

normocephalic. Hears conversational speech. No nasal drainage. 


RESPIRATORY:  Labored respirations and equal bilateral excursions. No gross 

wheezes. 


CARDIOVASCULAR:  Regular rate and rhythm.  


ABDOMEN:  Nontender.  Protuberant.


MUSCULOSKELETAL:  Nail and fingers with good capillary refill.


SKIN:  Warm and well perfused with good skin turgor.


NEUROLOGIC: No focal or lateralizing signs. 


PSYCH:  Appropriate affect.  Alert and oriented to person, place and time. 

Displays appropriate insight.





CLINCAL LABS: Reviewed. Hemoglobin down 10.9-10.6.





ASSESSMENT: 


1.  Gastrointestinal bleeding 


2.  Congestive heart failure with pneumonia


3.  Hyponatremia 


4.  Lactic acidosis on admission


5.  Pneumonia


6.  Umbilical hernia


7.  Gallstones


8.  Acute blood loss anemia


9.  Sigmoid diverticulosis





PLAN:


1.  He has risk of rebleed especially with sigmoid diverticulosis.


2.  Diet as tolerated.


3.  Repeat colonoscopy in 2 years, 2023





Objective





- Vital Signs


Vital signs: 


                                   Vital Signs











Temp  97.1 F L  09/08/21 08:00


 


Pulse  85   09/08/21 08:27


 


Resp  18   09/08/21 08:00


 


BP  135/71   09/08/21 08:00


 


Pulse Ox  100   09/08/21 08:03








                                 Intake & Output











 09/07/21 09/08/21 09/08/21





 18:59 06:59 18:59


 


Intake Total 980 780 240


 


Output Total 300  


 


Balance 680 780 240


 


Weight  133.3 kg 


 


Intake:   


 


  Oral 980 780 240


 


Output:   


 


  Stool 300  


 


Other:   


 


  Voiding Method Toilet Toilet Toilet





 Urinal  


 


  # Voids  1 


 


  # Bowel Movements  1 














- Labs


CBC & Chem 7: 


                                 09/07/21 09:07





                                 09/07/21 09:07


Labs: 


                  Abnormal Lab Results - Last 24 Hours (Table)











  09/07/21 09/07/21 09/07/21 Range/Units





  09:07 11:28 16:50 


 


POC Glucose (mg/dL)   194 H  313 H  (75-99)  mg/dL


 


Hemoglobin A1c  6.6 H    (4.0-6.0)  %














  09/07/21 09/08/21 Range/Units





  19:56 06:11 


 


POC Glucose (mg/dL)  262 H  165 H  (75-99)  mg/dL


 


Hemoglobin A1c    (4.0-6.0)  %








                      Microbiology - Last 24 Hours (Table)











 09/03/21 21:26 Blood Culture - Preliminary





 Blood    No Growth after 96 hours


 


 09/03/21 21:10 Blood Culture - Preliminary





 Blood    No Growth after 96 hours














Assessment and Plan


(1) CAP (community acquired pneumonia)


Current Visit: Yes   Status: Acute   Code(s): J18.9 - PNEUMONIA, UNSPECIFIED 

ORGANISM   SNOMED Code(s): 156136893


   





(2) Hematochezia


Current Visit: Yes   Status: Acute   Code(s): K92.1 - MELENA   SNOMED Code(s): 

225041674


   





(3) Morbid obesity with BMI of 40.0-44.9, adult


Current Visit: No   Status: Acute   Code(s): E66.01 - MORBID (SEVERE) OBESITY 

DUE TO EXCESS CALORIES   SNOMED Code(s): 766604517


   





(4) Type 2 diabetes mellitus


Current Visit: No   Status: Acute   Code(s): E11.9 - TYPE 2 DIABETES MELLITUS 

WITHOUT COMPLICATIONS   SNOMED Code(s): 75048877


   





(5) Umbilical hernia


Current Visit: Yes   Status: Acute   Code(s): K42.9 - UMBILICAL HERNIA WITHOUT 

OBSTRUCTION OR GANGRENE   SNOMED Code(s): 837526301


   





(6) Diverticulosis


Current Visit: Yes   Status: Acute   Code(s): K57.90 - DVRTCLOS OF INTEST, PART 

UNSP, W/O PERF OR ABSCESS W/O BLEED   SNOMED Code(s): 405529685


   





(7) Acute blood loss anemia


Current Visit: Yes   Status: Acute   Code(s): D62 - ACUTE POSTHEMORRHAGIC ANEMIA

  SNOMED Code(s): 994357717


   





(8) Gallstones


Current Visit: Yes   Status: Acute   Code(s): K80.20 - CALCULUS OF GALLBLADDER 

W/O CHOLECYSTITIS W/O OBSTRUCTION   SNOMED Code(s): 416233781

## 2021-09-07 NOTE — P.PN
Subjective


Progress Note Date: 09/07/21


Principal diagnosis: 


 Acute lower GI bleed with bright red blood per rectum, acute hypoxic 

respiratory failure





This is a 60-year-old male patient who follows with Dr. Arora as his primary

care provider.  He has history of diabetes mellitus, diastolic congestive heart 

failure, chronic obstructive pulmonary disease, chronic and ongoing tobacco 

dependence.  He presented to the emergency room last evening with reports of 

bright red blood per rectum.  He had approximate 4 episode yesterday.  No 

previous history of GI bleed.  Denies excessive alcohol use.  His been taking 

Naprosyn twice daily for left shoulder pain.  Chest x-ray revealed some mild 

infiltrates in the right lower lobe and left upper lobe.  We're consulted for 

the same.  He is seen in the emergency room.  Currently sitting up on the 

stretcher.  Awake and alert in no acute distress.  He is requiring 5 L high flow

nasal cannula to maintain O2 saturations in the 90s.  He does have home oxygen. 

He has a nebulizer with albuterol treatments.  He had not been seen by 

pulmonologist in the past.  He has been smoking for approximately 42 years.  

White count 8.5.  Hemoglobin 12.0.  Sodium 129.  Potassium 4.5.  Bicarb 35.  

Creatinine 1.15.  Initial lactic 2.9.  Currently 1.5.  ProBNP 43.  Stool for 

occult blood positive.  He's been initiated on vancomycin, DuoNeb inhalations, 

Protonix.  0.9 normal saline at 75 ML's per hour.





Progress note dated 09/05/2021.





60-year-old male, seen in consultation.  He has a history of diabetes mellitus, 

diastolic CHF, COPD, and chronic and ongoing tobacco dependence.  He presented 

to the emergency room, with bright red bleeding per rectum.  Also, the patient 

was complaining of being short of breath.  His COPD, was likely active.  

Currently, he's feeling better.  He does continue to smoke but states he is 

really trying seriously to quit.  Laboratory data includes a white count 8.4, 

hemoglobin 11.7, hematocrit 35.7, and a platelet count of 390,000.  Sodium 131, 

potassium 3.9, chlorides 88, CO2 38, anion gap is 5, BUN is 20, with creatinine 

0.85.  Lactate when last checked was 1.5.  Chest x-ray showed pneumonia 

involving the right lower lobe and left upper lobe.








The patient is seen today 09/06/2021 in follow-up on the regular medical floor. 

He is currently sitting up in a chair at the bedside.  Awake and alert in no 

acute distress.  Feeling a bit better today compared to yesterday.  He is 

maintaining O2 saturations up to 100% on 3 L/m per nasal cannula.  He's been 

afebrile.  Hemodynamically stable.  Follow-up chest x-ray reveals persistent 

left upper lobe density and mild strandy basilar densities.  White count 7.0.  

Hemoglobin 10.9.  Sodium 134.  Potassium 3.4.  Creatinine 0.73.  Plan is for 

EGD/colonoscopy today.  He remains on ceftriaxone and azithromycin along with 

DuoNeb inhalations, Pulmicort and Perforomist inhalations, NicoDerm patch, oral 

prednisone.





On 09/07/2021 patient seen in follow-up on selective care unit.  He is awake and

alert, in no acute distress, he is on 2 L of oxygen, with a pulse ox 94%, he is 

breathing comfortably, denies any chest discomfort, no hemoptysis, his 

hemoglobin is stable, 10.6 on today's labs, white blood cell count is 8.4, his 

platelet count was 418, sodium is 134, potassium 3.9, CO2 32, renal profile is 

within normal limits.  Cultures have shown no growth.  He is on a combination of

azithromycin and oral Omnicef, he is on oral prednisone, his 0.9 normal saline 

running at a rate of 20 ML per hour.  He underwent colonoscopy, general surgery 

is following. 








Objective





- Vital Signs


Vital signs: 


                                   Vital Signs











Temp  97.6 F   09/07/21 15:51


 


Pulse  91   09/07/21 16:10


 


Resp  16   09/07/21 15:51


 


BP  139/77   09/07/21 15:51


 


Pulse Ox  92 L  09/07/21 16:01








                                 Intake & Output











 09/06/21 09/07/21 09/07/21





 18:59 06:59 18:59


 


Intake Total 740  740


 


Output Total   300


 


Balance 740  440


 


Weight  132.9 kg 


 


Intake:   


 


    


 


  Oral 240  740


 


Output:   


 


  Stool   300


 


Other:   


 


  Voiding Method  Toilet Toilet





  Urinal Urinal


 


  # Voids 2 1 














- Exam


 GENERAL EXAM: Alert, very pleasant, 60-year-old white male, liters of oxygen a 

pulse ox of 93% comfortable in no apparent distress.


HEAD: Normocephalic/atraumatic.


EYES: Normal reaction of pupils, equal size.  Conjunctiva pink, sclera white.


NOSE: Clear with pink turbinates.


THROAT: No erythema or exudates.


NECK: No masses, no JVD, no thyroid enlargement, no adenopathy.


CHEST: No chest wall deformity.  Symmetrical expansion. 


LUNGS: Equal air entry with no crackles, wheeze, rhonchi or dullness.


CVS: Regular rate and rhythm, normal S1 and S2, no gallops, no murmurs, no rubs


ABDOMEN: Soft, nontender.  No hepatosplenomegaly, normal bowel sounds, no 

guarding or rigidity.


EXTREMITIES: No clubbing, no edema, no cyanosis, 2+ pulses and upper and lower 

extremities.


MUSCULOSKELETAL: Muscle strength and tone normal.


SPINE: No scoliosis or deformity


SKIN: No rashes


CENTRAL NERVOUS SYSTEM: Alert and oriented -3.  No focal deficits, tone is 

normal in all 4 extremities.


PSYCHIATRIC: Alert and oriented -3.  Appropriate affect.  Intact judgment and 

insight.











- Labs


CBC & Chem 7: 


                                 09/07/21 09:07





                                 09/07/21 09:07


Labs: 


                  Abnormal Lab Results - Last 24 Hours (Table)











  09/06/21 09/07/21 09/07/21 Range/Units





  20:08 06:02 09:07 


 


RBC    3.37 L  (4.30-5.90)  m/uL


 


Hgb    10.6 L  (13.0-17.5)  gm/dL


 


Hct    31.9 L  (39.0-53.0)  %


 


Sodium     (137-145)  mmol/L


 


Chloride     ()  mmol/L


 


Carbon Dioxide     (22-30)  mmol/L


 


Glucose     (74-99)  mg/dL


 


POC Glucose (mg/dL)  183 H  141 H   (75-99)  mg/dL














  09/07/21 09/07/21 09/07/21 Range/Units





  09:07 11:28 16:50 


 


RBC     (4.30-5.90)  m/uL


 


Hgb     (13.0-17.5)  gm/dL


 


Hct     (39.0-53.0)  %


 


Sodium  134 L    (137-145)  mmol/L


 


Chloride  95 L    ()  mmol/L


 


Carbon Dioxide  32 H    (22-30)  mmol/L


 


Glucose  139 H    (74-99)  mg/dL


 


POC Glucose (mg/dL)   194 H  313 H  (75-99)  mg/dL








                      Microbiology - Last 24 Hours (Table)











 09/03/21 21:10 Blood Culture - Preliminary





 Blood    No Growth after 72 hours


 


 09/03/21 21:26 Blood Culture - Preliminary





 Blood    No Growth after 72 hours














Assessment and Plan


Plan: 


 Assessment:





#1.  Acute GI blood loss anemia, with bright red blood per rectum, patient is 

status post colonoscopy, please refer to the report





#2.  Acute hypoxic respiratory failure secondary with persistent left upper lobe

density with mild strandy basilar density, possibility of pneumonia is less 

likely, however patient is empirically covered with azithromycin and Cefdinir 





#3.  Acute exacerbation of COPD





#4.  Chronic and ongoing tobacco dependence of greater than 40 years





#5.  History of diastolic CHF maintained on Lasix and Zaroxolyn in the 

outpatient setting





#6.  Chronic left shoulder pain





#7.  Diabetes multiple stenting





Plan:





Patient is breathing comfortably


No fever or chills


We can stop the antibiotics


No clear evidence of pneumonia on the chest x-ray or CT abdomen





I performed a history & physical examination of the patient and discussed their 

management with my nurse practitioner, Oly Ramsey.  I reviewed the nurse 

practitioner's note and agree with the documented findings and plan of care.  

Lung sounds are positive for diminished breath sounds throughout the lung 

fields.  The findings and the impression was discussed with the patient.  I 

attest to the documentation by the nurse practitioner. 





Time with Patient: Less than 30

## 2021-09-07 NOTE — PN
PROGRESS NOTE



DATE OF SERVICE:

09/07/2021



REASON FOR FOLLOWUP:

Pneumonia, lower extremity cellulitis.



INTERVAL HISTORY:

The patient is afebrile.  He seems to be slightly upset.  He did have another episode

of bleeding per rectum.  The patient denies having any chest pain, shortness of breath

or cough. No abdominal pain.  He denies any problem with his lower extremity.



PHYSICAL EXAMINATION:

Blood pressure 139/77, pulse of 80, temperature 97.6. He is 96% on 2 L nasal cannula.

GENERAL DESCRIPTION: General description is a middle-aged male up in the chair in no

distress.

RESPIRATORY SYSTEM: Unlabored breathing. Decreased intensity of breath sounds. No

wheeze.

HEART: S1, S2. Regular rate and rhythm.

ABDOMEN: Soft. No tenderness.

LOWER EXTREMITIES: Currently dressed. Patient refused to take his dressing off in the

presence of his nurse _____



LABS:

Hemoglobin is 10.6, white count 8.4, BUN of 13, creatinine 0.72.



DIAGNOSTIC IMPRESSION AND PLAN:

Patient admitted to hospital with _____ did have an abnormal x-ray.  Clinically not

behaving as pneumonia, and the patient has refused to take his dressing off. Cannot

assess for cellulitis with no fever or elevated white count.  Recommend _____

antibiotic on discharge. Continue supportive care.





MMODL / IJN: [id number] / Job#: [id number]

## 2021-09-08 LAB
BASOPHILS # BLD AUTO: 0 K/UL (ref 0–0.2)
BASOPHILS NFR BLD AUTO: 0 %
EOSINOPHIL # BLD AUTO: 0.1 K/UL (ref 0–0.7)
EOSINOPHIL NFR BLD AUTO: 1 %
ERYTHROCYTE [DISTWIDTH] IN BLOOD BY AUTOMATED COUNT: 2.84 M/UL (ref 4.3–5.9)
ERYTHROCYTE [DISTWIDTH] IN BLOOD: 14.6 % (ref 11.5–15.5)
GLUCOSE BLD-MCNC: 165 MG/DL (ref 75–99)
GLUCOSE BLD-MCNC: 205 MG/DL (ref 75–99)
GLUCOSE BLD-MCNC: 218 MG/DL (ref 75–99)
GLUCOSE BLD-MCNC: 245 MG/DL (ref 75–99)
HCT VFR BLD AUTO: 26.9 % (ref 39–53)
HGB BLD-MCNC: 9.1 GM/DL (ref 13–17.5)
LYMPHOCYTES # SPEC AUTO: 1.9 K/UL (ref 1–4.8)
LYMPHOCYTES NFR SPEC AUTO: 22 %
MCH RBC QN AUTO: 32 PG (ref 25–35)
MCHC RBC AUTO-ENTMCNC: 33.7 G/DL (ref 31–37)
MCV RBC AUTO: 94.8 FL (ref 80–100)
MONOCYTES # BLD AUTO: 0.5 K/UL (ref 0–1)
MONOCYTES NFR BLD AUTO: 6 %
NEUTROPHILS # BLD AUTO: 6.1 K/UL (ref 1.3–7.7)
NEUTROPHILS NFR BLD AUTO: 69 %
PLATELET # BLD AUTO: 441 K/UL (ref 150–450)
WBC # BLD AUTO: 8.8 K/UL (ref 3.8–10.6)

## 2021-09-08 RX ADMIN — INSULIN ASPART SCH UNIT: 100 INJECTION, SOLUTION INTRAVENOUS; SUBCUTANEOUS at 12:12

## 2021-09-08 RX ADMIN — FORMOTEROL FUMARATE DIHYDRATE SCH MCG: 20 SOLUTION RESPIRATORY (INHALATION) at 19:31

## 2021-09-08 RX ADMIN — IPRATROPIUM BROMIDE AND ALBUTEROL SULFATE SCH ML: .5; 3 SOLUTION RESPIRATORY (INHALATION) at 08:03

## 2021-09-08 RX ADMIN — Medication SCH MG: at 12:36

## 2021-09-08 RX ADMIN — HYDROCORTISONE SCH APPLIC: 25 CREAM TOPICAL at 20:13

## 2021-09-08 RX ADMIN — IPRATROPIUM BROMIDE AND ALBUTEROL SULFATE SCH ML: .5; 3 SOLUTION RESPIRATORY (INHALATION) at 19:31

## 2021-09-08 RX ADMIN — CEFAZOLIN SCH: 330 INJECTION, POWDER, FOR SOLUTION INTRAMUSCULAR; INTRAVENOUS at 21:35

## 2021-09-08 RX ADMIN — PANTOPRAZOLE SODIUM SCH MG: 40 TABLET, DELAYED RELEASE ORAL at 20:13

## 2021-09-08 RX ADMIN — INSULIN ASPART SCH UNIT: 100 INJECTION, SOLUTION INTRAVENOUS; SUBCUTANEOUS at 06:39

## 2021-09-08 RX ADMIN — BUDESONIDE SCH: 1 SUSPENSION RESPIRATORY (INHALATION) at 19:21

## 2021-09-08 RX ADMIN — IPRATROPIUM BROMIDE AND ALBUTEROL SULFATE SCH ML: .5; 3 SOLUTION RESPIRATORY (INHALATION) at 15:46

## 2021-09-08 RX ADMIN — FORMOTEROL FUMARATE DIHYDRATE SCH: 20 SOLUTION RESPIRATORY (INHALATION) at 19:21

## 2021-09-08 RX ADMIN — IPRATROPIUM BROMIDE AND ALBUTEROL SULFATE SCH ML: .5; 3 SOLUTION RESPIRATORY (INHALATION) at 11:19

## 2021-09-08 RX ADMIN — BUDESONIDE SCH MG: 1 SUSPENSION RESPIRATORY (INHALATION) at 19:31

## 2021-09-08 RX ADMIN — INSULIN ASPART SCH UNIT: 100 INJECTION, SOLUTION INTRAVENOUS; SUBCUTANEOUS at 16:44

## 2021-09-08 RX ADMIN — IPRATROPIUM BROMIDE AND ALBUTEROL SULFATE SCH: .5; 3 SOLUTION RESPIRATORY (INHALATION) at 19:21

## 2021-09-08 RX ADMIN — HYDROCORTISONE SCH APPLIC: 25 CREAM TOPICAL at 07:48

## 2021-09-08 RX ADMIN — INSULIN ASPART SCH UNIT: 100 INJECTION, SOLUTION INTRAVENOUS; SUBCUTANEOUS at 20:14

## 2021-09-08 RX ADMIN — NICOTINE SCH PATCH: 14 PATCH, EXTENDED RELEASE TRANSDERMAL at 07:48

## 2021-09-08 RX ADMIN — PANTOPRAZOLE SODIUM SCH MG: 40 TABLET, DELAYED RELEASE ORAL at 07:48

## 2021-09-08 RX ADMIN — FORMOTEROL FUMARATE DIHYDRATE SCH MCG: 20 SOLUTION RESPIRATORY (INHALATION) at 08:03

## 2021-09-08 RX ADMIN — BUDESONIDE SCH MG: 1 SUSPENSION RESPIRATORY (INHALATION) at 08:03

## 2021-09-08 RX ADMIN — ACETAMINOPHEN PRN MG: 500 TABLET ORAL at 23:04

## 2021-09-08 RX ADMIN — Medication SCH MG: at 16:45

## 2021-09-08 RX ADMIN — Medication SCH MG: at 16:49

## 2021-09-08 RX ADMIN — CEFDINIR SCH MG: 300 CAPSULE ORAL at 20:13

## 2021-09-08 NOTE — P.PN
Subjective


Progress Note Date: 09/08/21


Principal diagnosis: 


 Acute lower GI bleed with bright red blood per rectum, acute hypoxic 

respiratory failure





This is a 60-year-old male patient who follows with Dr. Arora as his primary

care provider.  He has history of diabetes mellitus, diastolic congestive heart 

failure, chronic obstructive pulmonary disease, chronic and ongoing tobacco 

dependence.  He presented to the emergency room last evening with reports of 

bright red blood per rectum.  He had approximate 4 episode yesterday.  No 

previous history of GI bleed.  Denies excessive alcohol use.  His been taking 

Naprosyn twice daily for left shoulder pain.  Chest x-ray revealed some mild 

infiltrates in the right lower lobe and left upper lobe.  We're consulted for 

the same.  He is seen in the emergency room.  Currently sitting up on the 

stretcher.  Awake and alert in no acute distress.  He is requiring 5 L high flow

nasal cannula to maintain O2 saturations in the 90s.  He does have home oxygen. 

He has a nebulizer with albuterol treatments.  He had not been seen by 

pulmonologist in the past.  He has been smoking for approximately 42 years.  

White count 8.5.  Hemoglobin 12.0.  Sodium 129.  Potassium 4.5.  Bicarb 35.  

Creatinine 1.15.  Initial lactic 2.9.  Currently 1.5.  ProBNP 43.  Stool for 

occult blood positive.  He's been initiated on vancomycin, DuoNeb inhalations, 

Protonix.  0.9 normal saline at 75 ML's per hour.





Progress note dated 09/05/2021.





60-year-old male, seen in consultation.  He has a history of diabetes mellitus, 

diastolic CHF, COPD, and chronic and ongoing tobacco dependence.  He presented 

to the emergency room, with bright red bleeding per rectum.  Also, the patient 

was complaining of being short of breath.  His COPD, was likely active.  

Currently, he's feeling better.  He does continue to smoke but states he is 

really trying seriously to quit.  Laboratory data includes a white count 8.4, 

hemoglobin 11.7, hematocrit 35.7, and a platelet count of 390,000.  Sodium 131, 

potassium 3.9, chlorides 88, CO2 38, anion gap is 5, BUN is 20, with creatinine 

0.85.  Lactate when last checked was 1.5.  Chest x-ray showed pneumonia 

involving the right lower lobe and left upper lobe.








The patient is seen today 09/06/2021 in follow-up on the regular medical floor. 

He is currently sitting up in a chair at the bedside.  Awake and alert in no 

acute distress.  Feeling a bit better today compared to yesterday.  He is 

maintaining O2 saturations up to 100% on 3 L/m per nasal cannula.  He's been 

afebrile.  Hemodynamically stable.  Follow-up chest x-ray reveals persistent 

left upper lobe density and mild strandy basilar densities.  White count 7.0.  

Hemoglobin 10.9.  Sodium 134.  Potassium 3.4.  Creatinine 0.73.  Plan is for 

EGD/colonoscopy today.  He remains on ceftriaxone and azithromycin along with 

DuoNeb inhalations, Pulmicort and Perforomist inhalations, NicoDerm patch, oral 

prednisone.





On 09/07/2021 patient seen in follow-up on selective care unit.  He is awake and

alert, in no acute distress, he is on 2 L of oxygen, with a pulse ox 94%, he is 

breathing comfortably, denies any chest discomfort, no hemoptysis, his 

hemoglobin is stable, 10.6 on today's labs, white blood cell count is 8.4, his 

platelet count was 418, sodium is 134, potassium 3.9, CO2 32, renal profile is 

within normal limits.  Cultures have shown no growth.  He is on a combination of

azithromycin and oral Omnicef, he is on oral prednisone, his 0.9 normal saline 

running at a rate of 20 ML per hour.  He underwent colonoscopy, general surgery 

is following.





Today's evaluation on 09/08/2021, this currently sitting up on the edge of the 

bed, breathing comfortably, he is on minimal oxygen, 2 L and this is what the 

patient usually wears at home on a regular basis socks is 100%, he is afebrile, 

occasional cough, lung sounds overall are positive for slight prolongation of 

the expiratory phase, a few rhonchi that clear with coughing.  No fever or 

chills overnight, no complaints of chest pain.  No abdominal pain, he has not 

passed any bowel movements today, he is status post colonoscopy by general 

surgery please refer to the report.  Her work is pending, his last hemoglobin 

from yesterday was 10.7, patient has not required blood transfusions this 

admission.


 








Objective





- Vital Signs


Vital signs: 


                                   Vital Signs











Temp  97.1 F L  09/08/21 08:00


 


Pulse  85   09/08/21 08:27


 


Resp  18   09/08/21 08:00


 


BP  135/71   09/08/21 08:00


 


Pulse Ox  100   09/08/21 08:03








                                 Intake & Output











 09/07/21 09/08/21 09/08/21





 18:59 06:59 18:59


 


Intake Total 980 780 240


 


Output Total 300  


 


Balance 680 780 240


 


Weight  133.3 kg 


 


Intake:   


 


  Oral 980 780 240


 


Output:   


 


  Stool 300  


 


Other:   


 


  Voiding Method Toilet Toilet Toilet





 Urinal  


 


  # Voids  1 


 


  # Bowel Movements  1 














- Exam


 GENERAL EXAM: Alert, very pleasant, 60-year-old white male, on 2 liters of 

oxygen a pulse ox of 93% comfortable in no apparent distress.


HEAD: Normocephalic/atraumatic.


EYES: Normal reaction of pupils, equal size.  Conjunctiva pink, sclera white.


NOSE: Clear with pink turbinates.


THROAT: No erythema or exudates.


NECK: No masses, no JVD, no thyroid enlargement, no adenopathy.


CHEST: No chest wall deformity.  Symmetrical expansion. 


LUNGS: Equal air entry with no crackles, wheeze, rhonchi or dullness.


CVS: Regular rate and rhythm, normal S1 and S2, no gallops, no murmurs, no rubs


ABDOMEN: Soft, nontender.  No hepatosplenomegaly, normal bowel sounds, no 

guarding or rigidity.


EXTREMITIES: No clubbing, no edema, no cyanosis, 2+ pulses and upper and lower 

extremities.


MUSCULOSKELETAL: Muscle strength and tone normal.


SPINE: No scoliosis or deformity


SKIN: No rashes


CENTRAL NERVOUS SYSTEM: Alert and oriented -3.  No focal deficits, tone is 

normal in all 4 extremities.


PSYCHIATRIC: Alert and oriented -3.  Appropriate affect.  Intact judgment and 

insight.











- Labs


CBC & Chem 7: 


                                 09/08/21 09:24





                                 09/07/21 09:07


Labs: 


                  Abnormal Lab Results - Last 24 Hours (Table)











  09/07/21 09/07/21 09/07/21 Range/Units





  09:07 11:28 16:50 


 


RBC     (4.30-5.90)  m/uL


 


Hgb     (13.0-17.5)  gm/dL


 


Hct     (39.0-53.0)  %


 


POC Glucose (mg/dL)   194 H  313 H  (75-99)  mg/dL


 


Hemoglobin A1c  6.6 H    (4.0-6.0)  %














  09/07/21 09/08/21 09/08/21 Range/Units





  19:56 06:11 09:24 


 


RBC    2.84 L  (4.30-5.90)  m/uL


 


Hgb    9.1 L D  (13.0-17.5)  gm/dL


 


Hct    26.9 L  (39.0-53.0)  %


 


POC Glucose (mg/dL)  262 H  165 H   (75-99)  mg/dL


 


Hemoglobin A1c     (4.0-6.0)  %








                      Microbiology - Last 24 Hours (Table)











 09/03/21 21:26 Blood Culture - Preliminary





 Blood    No Growth after 96 hours


 


 09/03/21 21:10 Blood Culture - Preliminary





 Blood    No Growth after 96 hours














Assessment and Plan


Plan: 


 Assessment:





#1.  Acute GI blood loss anemia, with bright red blood per rectum, patient is 

status post colonoscopy, please refer to the report





#2.  Acute hypoxic respiratory failure secondary with persistent left upper lobe

density with mild strandy basilar density, possibility of pneumonia is less 

likely, however patient is empirically covered with azithromycin and Cefdinir 





#3.  Acute exacerbation of COPD





#4.  Chronic and ongoing tobacco dependence of greater than 40 years





#5.  History of diastolic CHF maintained on Lasix and Zaroxolyn in the 

outpatient setting





#6.  Chronic left shoulder pain





#7.  Diabetes multiple stenting





Plan:





No acute events overnight,


Breathing comfortably,


Patient is on 2 L of oxygen which is what he usually wears at home for history 

of COPD


fever or chills, no significant cough or phlegm production


Hemodynamically stable,


No further rectal bleeding today


Today's blood Work is pending


Increase activity as tolerated


Patient may require antibiotics for areas of cellulitis in his bilateral lower 

extremities, will defer to ID service for that decision


Could be considered for discharge home from pulmonary perspective


Outpatient follow-up with Dr. Marin in the office in 7-10 days





I performed a history & physical examination of the patient and discussed their 

management with my nurse practitioner, Oly Ramsey.  I reviewed the nurse 

practitioner's note and agree with the documented findings and plan of care.  

Lung sounds are positive for diminished breath sounds throughout the lung 

fields.  The findings and the impression was discussed with the patient.  I 

attest to the documentation by the nurse practitioner. 





Time with Patient: Less than 30

## 2021-09-08 NOTE — P.PN
Subjective





60-year-old male patient who follows with Dr. Arora as his primary care 

provider.  He has history of diabetes mellitus, diastolic congestive heart 

failure, chronic obstructive pulmonary disease, chronic and ongoing tobacco 

dependence.  He presented to the emergency room last evening with reports of 

bright red blood per rectum.  He had approximate 4 episode yesterday.  No 

previous history of GI bleed.  Denies excessive alcohol use.  His been taking 

Naprosyn twice daily for left shoulder pain.  Chest x-ray revealed some mild 

infiltrates in the right lower lobe and left upper lobe.  We're consulted for 

the same.  He is seen in the emergency room.  Currently sitting up on the 

stretcher.  Awake and alert in no acute distress.  He is requiring 5 L high flow

nasal cannula to maintain O2 saturations in the 90s.  He does have home oxygen. 

He has a nebulizer with albuterol treatments.  He had not been seen by 

pulmonologist in the past.  He has been smoking for approximately 42 years.  

White count 8.5.  Hemoglobin 12.0.  Sodium 129.  Potassium 4.5.  Bicarb 35.  

Creatinine 1.15.  Initial lactic 2.9.  Currently 1.5.  ProBNP 43.  Stool for 

occult blood positive. 





09/05/2021


Patient is seen and evaluated in room with family members at bedside; report 

some improvement in breathing; has been evaluated by general surgery and is 

recommended colonoscopy


Vital signs remained stable temperature 97.9, pulse 80, respiration 18 and blood

pressure 130/73; lab review shows WBC 8.4, hemoglobin 11.7, platelet count of 39

0,000, sodium 131, potassium 3.9, BUN/creatinine of 20/0.85 with lactic acid 

levels of 1.5; chest x-ray reveals right lower lobe and left upper lobe 

pneumonia


Pulmonary is following and recommending to continue with current IV antibiotic 

therapy in form of ceftriaxone and azithromycin; Pulmicort, formoterol, 

albuterol sulfate and prednisone has been added for acute exacerbation COPD


Patient has been evaluated by general surgery and is recommended upper and lower

endoscopy; given risk for CHF patient will receive low-volume prep; continue 

monitor CBC








Subjective:


9/6/21


This is a pleasant 60 years old male who presents with bright red blood per 

rectum on 9/3 and has been evaluated by surgery team and he underwent 

colonoscopy today showing internal hemorrhoids with sigmoid diverticulosis and 

colonic adenoma status post polypectomy.  Currently he is been continued on IV 

Protonix 40 mg twice a day


His been diagnosed with left upper lobe pneumonia, he is been followed closely 

by pulmonary team and placed on Zithromax and ceftriaxone.


Patient states that his breathing is better today.  No significant cough or 

exertional dyspnea.  Complaining of from bilateral leg redness stating that they

are improving, he still has redness swelling and tenderness and warmth on the 

right leg with some scaling on both legs which looks chronic.


He is on home oxygen and 2 L milliliters per hour


The correct with Zithromax and ceftriaxone as well as prednisone 40 mg.


His hyponatremia is improving and today sodium is 134.  Diabetic gallstone 

disease evaluated by surgery team








09/07/2021


Reason well, he still have some mild dyspnea with mild wheezing breath no chest 

pain.  Patient was talking freely on room air today although he's I used 2 L/m 

of oxygen via nasal cannula at home.  I then that he is hemodynamically stable, 

labs are stable and his hemoglobin stable at 10.6.


Refused infectious disease team to check his right leg and he was to follow up 

as an outpatient


Meter team switched his antibiotics to oral pills of Zithromax and cefdionir, 


He has some episodes of blood per rectum 2, most likely secondary to 

hemorrhoids. hydrocortisone cream was ordered.


Polyp biopsy is pending, hemoglobin A1c is pending


Possible discharge in 24-48 hours if remains stable and his hemoglobin is stable





9/8/21


Patient breathing quietly and his wheezing is better today.  No chest pain.  No 

dizziness.  However he had 2 episodes of blood per rectum and his hemoglobin 

dropped 10.6 down to 9.1.  Other than that he is hemodynamically stable.


He remains on Protonix by mouth 40 mg twice daily.  Also he is on prednisone 40 

mg daily


He is on Cefdinir for his right leg cellulitis which is improving as per 

patient.  He is satisfied with the progress in his right leg cellulitis and he 

showed us all picture before the treatment which looks worse then


He was started on hydrocortisone for possible hemorrhoids


Patient still lives to be monitored for his anemia





Objective





- Vital Signs


Vital signs: 


                                   Vital Signs











Temp  97.5 F L  09/08/21 11:48


 


Pulse  87   09/08/21 11:48


 


Resp  18   09/08/21 11:48


 


BP  133/73   09/08/21 11:48


 


Pulse Ox  100   09/08/21 11:48








                                 Intake & Output











 09/07/21 09/08/21 09/08/21





 18:59 06:59 18:59


 


Intake Total 980 780 240


 


Output Total 300  


 


Balance 680 780 240


 


Weight  133.3 kg 


 


Intake:   


 


  Oral 980 780 240


 


Output:   


 


  Stool 300  


 


Other:   


 


  Voiding Method Toilet Toilet Toilet





 Urinal  


 


  # Voids  1 


 


  # Bowel Movements  1 














- Exam





GENERAL: The patient is alert and oriented x3, not in any acute distress. Well 

developed, well nourished. 


HEENT: Pupils are round and equally reacting to light. EOMI. No scleral icterus.

No conjunctival pallor. Normocephalic, atraumatic. No pharyngeal erythema. No 

thyromegaly. 


CARDIOVASCULAR: S1 and S2 present. No murmurs, rubs, or gallops. 


PULMONARY: Chest is clear to auscultation, no wheezing or crackles. 


ABDOMEN: Soft, nontender, nondistended, normoactive bowel sounds. No palpable 

organomegaly. 


MUSCULOSKELETAL: No joint swelling or deformity. 


EXTREMITIES: No cyanosis, clubbing, or pedal edema.  Bilateral leg scaling, 

right leg is red and swollen and warm


NEUROLOGICAL: Gross neurological examination did not reveal any focal deficits. 


SKIN: No rashes. no petechiae.





- Labs


CBC & Chem 7: 


                                 09/08/21 09:24





                                 09/07/21 09:07


Labs: 


                  Abnormal Lab Results - Last 24 Hours (Table)











  09/07/21 09/07/21 09/07/21 Range/Units





  09:07 16:50 19:56 


 


RBC     (4.30-5.90)  m/uL


 


Hgb     (13.0-17.5)  gm/dL


 


Hct     (39.0-53.0)  %


 


POC Glucose (mg/dL)   313 H  262 H  (75-99)  mg/dL


 


Hemoglobin A1c  6.6 H    (4.0-6.0)  %














  09/08/21 09/08/21 Range/Units





  06:11 09:24 


 


RBC   2.84 L  (4.30-5.90)  m/uL


 


Hgb   9.1 L D  (13.0-17.5)  gm/dL


 


Hct   26.9 L  (39.0-53.0)  %


 


POC Glucose (mg/dL)  165 H   (75-99)  mg/dL


 


Hemoglobin A1c    (4.0-6.0)  %








                      Microbiology - Last 24 Hours (Table)











 09/03/21 21:26 Blood Culture - Preliminary





 Blood    No Growth after 96 hours


 


 09/03/21 21:10 Blood Culture - Preliminary





 Blood    No Growth after 96 hours














Assessment and Plan


Assessment: 





Assessment: 





1.  Acute lower GI bleed; status post colonoscopy showing diverticulosis, 

internal hemorrhoids grade 1 status post polypectomy


- We will monitor H&H closely; Protonix 40 mg po daily; we will plan to type 

crossmatch and transfuse if hemoglobin is less than 8.0


-Monitor hemoglobin results.


- Could be due to internal hemorrhoids,start hydrocortisone topical





2.  Acute hypoxemic respiratory failure; multifactorial; secondary to CPAP, 

acute exacerbation COPD.





3.  Community-acquired pneumonia


- Continue on ceftriaxone and azithromycin


- Pulmonary team on the case





4.  Acute exacerbation COPD; continue with DuoNeb nebulizer treatments along 

with Pulmicort and Perforomist inhalations; IV Solu-Medrol


- Patient is on antibiotic therapy with Rocephin and ceftriaxone


- Pulmonary team on the case





5.  Right leg cellulitis.  Likely on Rocephin, infectious disease team is 

consulted





6.  Diabetes mellitus; monitor Accu-Cheks every before meals and at bedtime with

insulin sliding scale





7.  Osteoarthritis; chronic left shoulder pain; patient currently taking 

Naprosyn twice a day dose 





8. Diastolic CHF; not in exacerbation; patient remains on diuretic therapy with 

Lasix and Zaroxolyn





DVT prophylaxis; SCDs only due to GI bleed


CODE STATUS; full code

## 2021-09-08 NOTE — PN
PROGRESS NOTE



DATE OF SERVICE:

09/08/2021



REASON FOR FOLLOW UP:

Lower extremity cellulitis and question of pneumonia.



INTERVAL HISTORY:

The patient is afebrile.  He is breathing comfortably.  Denies having any chest pain,

shortness of breath or cough.  No abdominal pain.  Overall swelling and redness of the

right leg has decreased.



PHYSICAL EXAMINATION:

Blood pressure 133/73 with a pulse of 87, temperature is 97.5, he is 100%  on 2 L nasal

cannula. General description is a middle-aged male up in the bed in no distress.

Respiratory system: Unlabored breathing, decreased intensity in breath sounds in the

base, with no wheeze.  Heart S1, S2.  Regular rate and rhythm.  Abdomen:  Soft, no

tenderness.  Right leg swelling and redness has decreased.



LABS:

Hemoglobin 11.1, white 8.8.  Blood culture has been negative.



DIAGNOSTIC IMPRESSION AND PLAN:

Patient with right lower extremity cellulitis.  The patient overall improvement on

Ceftin to finish therapy with a week of oral Ceftin.  Continue with compression

dressing and a close outpatient followup.  Discussed with admitting physician.





MMODL / IJN: [id number] / Job#: [id number]

## 2021-09-08 NOTE — P.PN
<Cherelle Mary - Last Filed: 09/08/21 15:15>





Subjective


Progress Note Date: 09/08/21





CHIEF COMPLAINT: GI bleed





HISTORY OF PRESENT ILLNESS: Patient presented with blood in stools including a 4

g drop in hemoglobin during his hospitalization.  He is status post colonoscopy 

with snare of polypectomy.  Results showed sigmoid diverticulosis with 

pandiverticulosis and sigmoid colon adenoma.  Patient had blood reported in his 

stools yesterday afternoon and again had a small bloody bowel movement last 

night.  He denies any abdominal pain.  Denies any nausea or vomiting.  Still 

reporting some shortness of breath.  Afebrile.  WBC is 8.8 hemoglobin down from 

10.6 to 9.1 hemorrhoid cream was added by medical service





PHYSICAL EXAM: 


VITAL SIGNS: Reviewed


GENERAL: Well-developed in no acute distress. 


HEENT:  No sclera icterus. Extraocular movements grossly intact.  Moist buccal 

mucosa. 


Head is atraumatic, normocephalic. Hears conversational speech. No nasal 

drainage.


NECK:  Supple without lymphadenopathy.


CHEST:  Non-labored respirations and equal bilateral excursions. 


CARDIOVASCULAR:   Palpable 2+ radial pulses.


ABDOMEN:  Soft.  Nondistended. Nontender.


MUSCULOSKELETAL:  No clubbing or cyanosis.


NEUROLOGIC:  No focal or lateralizing signs.  Cranial nerves II through XII 

grossly intact.


PSYCH:  Appropriate affect.  Alert and oriented to person, place and time.


SKIN: Well perfused.  Good skin turgor. 





ASSESSMENT: 


1.  Acute GI bleed likely secondary to diverticular bleed


2.  Acute blood loss anemia


3.  Status post colonoscopy with polypectomy.  Results showed sigmoid 

diverticulosis with pandiverticulosis and sigmoid colon adenoma


4.  Acute congestive heart failure with exacerbation


5.  Acute chronic obstructive pulmonary disease with exacerbation





PLAN: 


-Recommend repeat lower endoscopy in 2 years, 2023


-Continue regular diet


-Continue to monitor for any further signs or symptoms of bleeding.


-Continue to monitor hemoglobin





Physician Assistant note has been reviewed by physician. Signing provider agrees

with the documented findings, assessment, and plan of care. 





Objective





- Vital Signs


Vital signs: 


                                   Vital Signs











Temp  97.5 F L  09/08/21 11:48


 


Pulse  87   09/08/21 14:00


 


Resp  18   09/08/21 14:00


 


BP  133/73   09/08/21 11:48


 


Pulse Ox  100   09/08/21 11:48








                                 Intake & Output











 09/07/21 09/08/21 09/08/21





 18:59 06:59 18:59


 


Intake Total 980 780 240


 


Output Total 300  


 


Balance 680 780 240


 


Weight  133.3 kg 


 


Intake:   


 


  Oral 980 780 240


 


Output:   


 


  Stool 300  


 


Other:   


 


  Voiding Method Toilet Toilet Toilet





 Urinal  


 


  # Voids  1 1


 


  # Bowel Movements  1 1














- Labs


CBC & Chem 7: 


                                 09/08/21 09:24





                                 09/07/21 09:07


Labs: 


                  Abnormal Lab Results - Last 24 Hours (Table)











  09/07/21 09/07/21 09/07/21 Range/Units





  09:07 16:50 19:56 


 


RBC     (4.30-5.90)  m/uL


 


Hgb     (13.0-17.5)  gm/dL


 


Hct     (39.0-53.0)  %


 


POC Glucose (mg/dL)   313 H  262 H  (75-99)  mg/dL


 


Hemoglobin A1c  6.6 H    (4.0-6.0)  %














  09/08/21 09/08/21 09/08/21 Range/Units





  06:11 09:24 11:55 


 


RBC   2.84 L   (4.30-5.90)  m/uL


 


Hgb   9.1 L D   (13.0-17.5)  gm/dL


 


Hct   26.9 L   (39.0-53.0)  %


 


POC Glucose (mg/dL)  165 H   205 H  (75-99)  mg/dL


 


Hemoglobin A1c     (4.0-6.0)  %








                      Microbiology - Last 24 Hours (Table)











 09/03/21 21:26 Blood Culture - Preliminary





 Blood    No Growth after 96 hours


 


 09/03/21 21:10 Blood Culture - Preliminary





 Blood    No Growth after 96 hours














<Michelle Saldaña N - Last Filed: 09/09/21 11:37>





Subjective





As above.  Diet as tolerated.  We'll monitor hemoglobin.  Any large acute signs 

of bleeding may need tagged RBC scan.  Otherwise patient reports he is stable.





Objective





- Vital Signs


Vital signs: 


                                   Vital Signs











Temp  98.0 F   09/09/21 08:00


 


Pulse  87   09/09/21 08:00


 


Resp  19   09/09/21 08:00


 


BP  131/68   09/09/21 08:00


 


Pulse Ox  100   09/09/21 08:00








                                 Intake & Output











 09/08/21 09/09/21 09/09/21





 18:59 06:59 18:59


 


Intake Total 480 780 480


 


Balance 480 780 480


 


Weight  133.9 kg 


 


Intake:   


 


  Oral 480 780 480


 


Other:   


 


  Voiding Method Toilet Toilet Toilet


 


  # Voids 1 1 


 


  # Bowel Movements 1 1 














- Labs


CBC & Chem 7: 


                                 09/08/21 09:24





                                 09/07/21 09:07


Labs: 


                  Abnormal Lab Results - Last 24 Hours (Table)











  09/08/21 09/08/21 09/08/21 Range/Units





  11:55 16:41 20:03 


 


POC Glucose (mg/dL)  205 H  245 H  218 H  (75-99)  mg/dL














  09/09/21 Range/Units





  06:16 


 


POC Glucose (mg/dL)  159 H  (75-99)  mg/dL








                      Microbiology - Last 24 Hours (Table)











 09/03/21 21:10 Blood Culture - Preliminary





 Blood    No Growth after 120 hours


 


 09/03/21 21:26 Blood Culture - Preliminary





 Blood    No Growth after 120 hours














Assessment and Plan


(1) CAP (community acquired pneumonia)


Current Visit: Yes   Status: Acute   Code(s): J18.9 - PNEUMONIA, UNSPECIFIED 

ORGANISM   SNOMED Code(s): 566733858


   





(2) Hematochezia


Current Visit: Yes   Status: Acute   Code(s): K92.1 - MELENA   SNOMED Code(s): 

155884934


   





(3) Morbid obesity with BMI of 40.0-44.9, adult


Current Visit: No   Status: Acute   Code(s): E66.01 - MORBID (SEVERE) OBESITY 

DUE TO EXCESS CALORIES   SNOMED Code(s): 869951417


   





(4) Type 2 diabetes mellitus


Current Visit: No   Status: Acute   Code(s): E11.9 - TYPE 2 DIABETES MELLITUS 

WITHOUT COMPLICATIONS   SNOMED Code(s): 68283471


   





(5) Umbilical hernia


Current Visit: Yes   Status: Acute   Code(s): K42.9 - UMBILICAL HERNIA WITHOUT 

OBSTRUCTION OR GANGRENE   SNOMED Code(s): 009600710


   





(6) Diverticulosis


Current Visit: Yes   Status: Acute   Code(s): K57.90 - DVRTCLOS OF INTEST, PART 

UNSP, W/O PERF OR ABSCESS W/O BLEED   SNOMED Code(s): 439396791


   





(7) Acute blood loss anemia


Current Visit: Yes   Status: Acute   Code(s): D62 - ACUTE POSTHEMORRHAGIC ANEMIA

  SNOMED Code(s): 366686568


   





(8) Gallstones


Current Visit: Yes   Status: Acute   Code(s): K80.20 - CALCULUS OF GALLBLADDER 

W/O CHOLECYSTITIS W/O OBSTRUCTION   SNOMED Code(s): 990659417

## 2021-09-09 VITALS — DIASTOLIC BLOOD PRESSURE: 68 MMHG | RESPIRATION RATE: 18 BRPM | SYSTOLIC BLOOD PRESSURE: 135 MMHG | TEMPERATURE: 97.8 F

## 2021-09-09 VITALS — HEART RATE: 87 BPM

## 2021-09-09 LAB
ANION GAP SERPL CALC-SCNC: 5 MMOL/L
BASOPHILS # BLD AUTO: 0.1 K/UL (ref 0–0.2)
BASOPHILS NFR BLD AUTO: 1 %
BUN SERPL-SCNC: 18 MG/DL (ref 9–20)
CALCIUM SPEC-MCNC: 9.3 MG/DL (ref 8.4–10.2)
CHLORIDE SERPL-SCNC: 95 MMOL/L (ref 98–107)
CO2 SERPL-SCNC: 34 MMOL/L (ref 22–30)
EOSINOPHIL # BLD AUTO: 0.1 K/UL (ref 0–0.7)
EOSINOPHIL NFR BLD AUTO: 1 %
ERYTHROCYTE [DISTWIDTH] IN BLOOD BY AUTOMATED COUNT: 3.02 M/UL (ref 4.3–5.9)
ERYTHROCYTE [DISTWIDTH] IN BLOOD: 14.8 % (ref 11.5–15.5)
GLUCOSE BLD-MCNC: 159 MG/DL (ref 75–99)
GLUCOSE BLD-MCNC: 183 MG/DL (ref 75–99)
GLUCOSE SERPL-MCNC: 141 MG/DL (ref 74–99)
HCT VFR BLD AUTO: 28.8 % (ref 39–53)
HGB BLD-MCNC: 9.4 GM/DL (ref 13–17.5)
LYMPHOCYTES # SPEC AUTO: 2.2 K/UL (ref 1–4.8)
LYMPHOCYTES NFR SPEC AUTO: 21 %
MCH RBC QN AUTO: 31.2 PG (ref 25–35)
MCHC RBC AUTO-ENTMCNC: 32.7 G/DL (ref 31–37)
MCV RBC AUTO: 95.5 FL (ref 80–100)
MONOCYTES # BLD AUTO: 0.5 K/UL (ref 0–1)
MONOCYTES NFR BLD AUTO: 5 %
NEUTROPHILS # BLD AUTO: 7.2 K/UL (ref 1.3–7.7)
NEUTROPHILS NFR BLD AUTO: 70 %
PLATELET # BLD AUTO: 462 K/UL (ref 150–450)
POTASSIUM SERPL-SCNC: 4.4 MMOL/L (ref 3.5–5.1)
SODIUM SERPL-SCNC: 134 MMOL/L (ref 137–145)
WBC # BLD AUTO: 10.4 K/UL (ref 3.8–10.6)

## 2021-09-09 RX ADMIN — IPRATROPIUM BROMIDE AND ALBUTEROL SULFATE SCH: .5; 3 SOLUTION RESPIRATORY (INHALATION) at 09:48

## 2021-09-09 RX ADMIN — INSULIN ASPART SCH UNIT: 100 INJECTION, SOLUTION INTRAVENOUS; SUBCUTANEOUS at 12:47

## 2021-09-09 RX ADMIN — PANTOPRAZOLE SODIUM SCH MG: 40 TABLET, DELAYED RELEASE ORAL at 09:28

## 2021-09-09 RX ADMIN — FORMOTEROL FUMARATE DIHYDRATE SCH: 20 SOLUTION RESPIRATORY (INHALATION) at 10:05

## 2021-09-09 RX ADMIN — CEFDINIR SCH MG: 300 CAPSULE ORAL at 09:28

## 2021-09-09 RX ADMIN — Medication SCH MG: at 06:45

## 2021-09-09 RX ADMIN — IPRATROPIUM BROMIDE AND ALBUTEROL SULFATE SCH: .5; 3 SOLUTION RESPIRATORY (INHALATION) at 10:05

## 2021-09-09 RX ADMIN — INSULIN ASPART SCH UNIT: 100 INJECTION, SOLUTION INTRAVENOUS; SUBCUTANEOUS at 06:45

## 2021-09-09 RX ADMIN — NICOTINE SCH PATCH: 14 PATCH, EXTENDED RELEASE TRANSDERMAL at 09:28

## 2021-09-09 RX ADMIN — BUDESONIDE SCH: 1 SUSPENSION RESPIRATORY (INHALATION) at 10:04

## 2021-09-09 RX ADMIN — HYDROCORTISONE SCH APPLIC: 25 CREAM TOPICAL at 09:28

## 2021-09-09 NOTE — P.PN
Subjective


Progress Note Date: 09/09/21








CHIEF COMPLAINT: Hematochezia





HISTORY OF PRESENT ILLNESS: The patient is a 60 year old male admitted for acute

exacerbation of congestive heart failure including chronic structure pulmonary 

disease with status post upper and lower endoscopy due to gastrointestinal 

bleeding.  Patient had showed his recent blood in stools from last night.  

Reports is blood in stools is not as copious at the time of admission.  He is 

eager to go home.  He did have a polypectomy during his lower endoscopy.





REVIEW OF ORGAN SYSTEMS: Has shortness of breath. No active chest pain.  No 

fevers or chills.








PHYSICAL EXAM:


VITALS: Reviewed


CONSTITUTIONAL:  Well developed and in no acute distress. 


EYES:  Conjuctivae without sclera icterus.  Extraocular movements grossly 

intact. 


HEAD, EARS, NOSE, THROAT: Moist buccal mucosa. Head is atraumatic, 

normocephalic. Hears conversational speech. No nasal drainage. 


RESPIRATORY:  Non-labored respirations and equal bilateral excursions. No gross 

wheezes. 


CARDIOVASCULAR:  Regular rate and rhythm.  


ABDOMEN:  Nontender.  Protuberant.


MUSCULOSKELETAL:  Nail and fingers with good capillary refill.


SKIN:  Warm and well perfused with good skin turgor.


NEUROLOGIC: No focal or lateralizing signs. 


PSYCH:  Appropriate affect.  Alert and oriented to person, place and time. 

Displays appropriate insight.





CLINCAL LABS: Reviewed. Hemoglobin down to 9.1 yesterday.





ASSESSMENT: 


1.  Gastrointestinal bleeding 


2.  Congestive heart failure with pneumonia


3.  Hyponatremia 


4.  Lactic acidosis on admission


5.  Pneumonia


6.  Umbilical hernia


7.  Gallstones


8.  Acute blood loss anemia


9.  Sigmoid diverticulosis





PLAN:


1.  Patient denies any moderate bleeding today.


2.  With his history of sigmoid diverticulosis, he is at risk for rebleed.


3.  Patient does report starting multiple supplements including multivitamins 

which can increase his risk for bleeding.  Drug effect from supplements may be 

1-2 weeks coincidental this bleeding.


4.  May benefit from iron supplements.


5.  Stable for discharge from a surgical standpoint


6.  Avoid anticoagulants.





Objective





- Vital Signs


Vital signs: 


                                   Vital Signs











Temp  98.0 F   09/09/21 08:00


 


Pulse  87   09/09/21 08:00


 


Resp  19   09/09/21 08:00


 


BP  131/68   09/09/21 08:00


 


Pulse Ox  100   09/09/21 08:00








                                 Intake & Output











 09/08/21 09/09/21 09/09/21





 18:59 06:59 18:59


 


Intake Total 480 780 480


 


Balance 480 780 480


 


Weight  133.9 kg 


 


Intake:   


 


  Oral 480 780 480


 


Other:   


 


  Voiding Method Toilet Toilet Toilet


 


  # Voids 1 1 


 


  # Bowel Movements 1 1 














- Labs


CBC & Chem 7: 


                                 09/08/21 09:24





                                 09/07/21 09:07


Labs: 


                  Abnormal Lab Results - Last 24 Hours (Table)











  09/08/21 09/08/21 09/08/21 Range/Units





  11:55 16:41 20:03 


 


POC Glucose (mg/dL)  205 H  245 H  218 H  (75-99)  mg/dL














  09/09/21 Range/Units





  06:16 


 


POC Glucose (mg/dL)  159 H  (75-99)  mg/dL








                      Microbiology - Last 24 Hours (Table)











 09/03/21 21:10 Blood Culture - Preliminary





 Blood    No Growth after 120 hours


 


 09/03/21 21:26 Blood Culture - Preliminary





 Blood    No Growth after 120 hours














Assessment and Plan


(1) CAP (community acquired pneumonia)


Current Visit: Yes   Status: Acute   Code(s): J18.9 - PNEUMONIA, UNSPECIFIED 

ORGANISM   SNOMED Code(s): 126542105


   





(2) Hematochezia


Current Visit: Yes   Status: Acute   Code(s): K92.1 - MELENA   SNOMED Code(s): 

181239222


   





(3) Morbid obesity with BMI of 40.0-44.9, adult


Current Visit: No   Status: Acute   Code(s): E66.01 - MORBID (SEVERE) OBESITY 

DUE TO EXCESS CALORIES   SNOMED Code(s): 189212240


   





(4) Type 2 diabetes mellitus


Current Visit: No   Status: Acute   Code(s): E11.9 - TYPE 2 DIABETES MELLITUS 

WITHOUT COMPLICATIONS   SNOMED Code(s): 32972322


   





(5) Umbilical hernia


Current Visit: Yes   Status: Acute   Code(s): K42.9 - UMBILICAL HERNIA WITHOUT 

OBSTRUCTION OR GANGRENE   SNOMED Code(s): 339323939


   





(6) Diverticulosis


Current Visit: Yes   Status: Acute   Code(s): K57.90 - DVRTCLOS OF INTEST, PART 

UNSP, W/O PERF OR ABSCESS W/O BLEED   SNOMED Code(s): 943917520


   





(7) Acute blood loss anemia


Current Visit: Yes   Status: Acute   Code(s): D62 - ACUTE POSTHEMORRHAGIC ANEMIA

  SNOMED Code(s): 561788048


   





(8) Gallstones


Current Visit: Yes   Status: Acute   Code(s): K80.20 - CALCULUS OF GALLBLADDER 

W/O CHOLECYSTITIS W/O OBSTRUCTION   SNOMED Code(s): 388128550

## 2021-09-09 NOTE — PN
PROGRESS NOTE



DATE OF SERVICE:

09/09/2021



REASON FOR FOLLOWUP:

Lower extremity cellulitis.



INTERVAL HISTORY:

The patient is afebrile. He is breathing comfortably.  Denies having any chest pain,

shortness of breath or cough.  No abdominal pain.  Overall swelling in his lower

extremities has improved.



PHYSICAL EXAMINATION:

Blood pressure _____/68 with a pulse of 80, temperature 97.8. He is 97% on 2 L nasal

cannula.

GENERAL DESCRIPTION: General description is a middle-aged male lying in bed in no

distress.

RESPIRATORY SYSTEM: Unlabored breathing. Clear to auscultation anteriorly.

HEART: S1, S2. Regular rate and rhythm.

ABDOMEN: Soft. No tenderness.

LEGS: Lower leg swelling _____ has decreased.



LABS:

Hemoglobin is 9.4, white count 10.4, BUN of 18, creatinine 0.97.



DIAGNOSTIC IMPRESSION AND PLAN:

Patient with mild lower extremity cellulitis, right greater than the left.  Overall

improvement.  Currently on Ceftin; to continue for another week to finish course of

therapy and close outpatient followup.





MMODL / IJN: [id number] / Job#: [id number]

## 2021-09-09 NOTE — P.DS
Providers


Date of admission: 


09/03/21 20:59





Attending physician: 


Yumiko Aden





Consults: 





                                        





09/03/21 21:00


Consult Physician Urgent 


   Consulting Provider: Michelle Saldaña


   Consult Reason/Comments: acute hematochezia


   Do you want consulting provider notified?: Already Contacted





09/04/21 13:38


Consult Physician Routine 


   Consulting Provider: Santy Marin


   Consult Reason/Comments: PNA/ Ac resp failure


   Do you want consulting provider notified?: Yes





09/06/21 15:10


Consult Physician Urgent 


   Consulting Provider: Jose Ortiz


   Consult Reason/Comments: pna and possilbe cellulitis


   Do you want consulting provider notified?: Yes











Primary care physician: 


Jesica Arora





Hospital Course: 





Diagnoses:


1.  Acute lower GI bleed; status post colonoscopy showing diverticulosis, 

internal hemorrhoids grade 1 status post polypectomy.  Biopsy no malignant cells


2.  Acute hypoxemic respiratory failure.  Resolved and patient is on room air


3.  Community-acquired pneumonia.  Resolved


4.  Acute exacerbation COPD; improved


5.  Right leg cellulitis.  Improved


6.  Diabetes mellitus


7.  Osteoarthritis


8. Diastolic CHF; not in exacerbation





Hospital course:


60-year-old male patient who follows with Dr. Arora as his primary care 

provider.  He has history of diabetes mellitus, diastolic congestive heart 

failure, chronic obstructive pulmonary disease, chronic and ongoing tobacco 

dependence.  He presented to the emergency room with reports of bright red blood

per rectum of one-day duration.  He had several episodes of small amount of 

blood per rectum.  No abdominal pain.  No nausea vomiting.  Patient has been ev

aluated by surgery team.  CT of the abdomen and pelvis with contrast showing no 

small bowel obstruction, no obvious source of bleeding, he has some umbilical 

hernia and cholelithiasis which is asymptomatic.  Patient underwent colonoscopy 

showing  diverticulosis, internal hemorrhoids grade 1 status post 

polypectomy.Biopsy showing sigmoid colon polyp with traditional serrated 

adenoma.


 also patient has some pneumonia suspected COPD with chest x-ray showing 

possible bilateral densities.  His been evaluated by pulmonary team and he has 

been treated with breathing treatment and antibiotics, he showed interval 

improvement and on the day of discharge he was on room air.  Also he received 

antibiotics which helped his right leg cellulitis.  Patient has been followed 

closely by pulmonary and infectious disease team.


On the day of discharge patient was fully awake and oriented.  Denies chest pain

or dyspnea.  No abdominal pain.  No change in urine or bowel habits.  No fever.


He still have some occasional episodes of blood per rectum, which is mild even 

on the day of discharge.  Dr. Garcia surgery team are aware of these episodes

however he is hemodynamically stable.  Hemoglobin actually improved today 9.1 up

to 9.4.  Was started on iron pill.  Patient was cleared for discharge by surgery

team.  Patient aware of this recommendation and he agrees to go home on follow-

up with Dr. Garcia on 9/14.


Also patient was cleared for discharge by pulmonary and ID team.  For discharge 

on short course of oral antibiotics of cefdinir 5 days and tapered prednisone


Problems and management plan were discussed with the patient and he verbalized 

understanding and acceptance


Patient was found stable and can be discharged home however he needs follow-up 

as an outpatient. Patient was instructed to follow up with PCP Dr. Arora 

within one week and patient agrees


Patient was instructed to follow up with Dr. Marin in 7-10 days


She was instructed to follow up with Dr. Garcia on 9/14 and he agrees patient

asked medical staff to make appointments for him, please refer to discharge 

instructions








Physical exam


-Gen: patient is a AAOx3, no distress.  Obese


CVS: S1-S2, RRR, no murmur


Lungs: B/L CTA, no wheezing


Abdomen: soft, no distention, no tenderness, positive bowel sounds


-Extremity: no leg edema or induration.  Right leg cellulitis improvement





Time spent more than 35 minutes





Patient Condition at Discharge: Stable





Plan - Discharge Summary


Discharge Rx Participant: Yes


New Discharge Prescriptions: 


New


   Nicotine 14Mg/24Hr Patch [Habitrol] 1 patch TRANSDERM DAILY #3 patch


   Ferrous Sulfate [Iron (65 MG Elemental)] 325 mg PO BID-W/MEALS #60 tab


   predniSONE 10 mg PO AS DIRECTED #18 tab


   Hydrocortisone Pr Cream [Proctosol-Hc 2.5%] 1 applic RECTAL BID 3 Days #1 tub


   Pantoprazole [Protonix] 40 mg PO BID #60 tab


   Budesonide [Pulmicort] 1 mg INHALATION RT-BID #30 ml


   Albuterol Inhaler [Ventolin Hfa Inhaler] 1 puff INHALATION RT-TID #8 gm


   Cefdinir [Omnicef] 300 mg PO BID 5 Days #10 cap





Continue


   Sulfamethox-Tmp 800-160Mg [Bactrim -160 mg] 1 tab PO Q12HR


   Albuterol Sulfate [Ventolin HFA] 1 - 2 puff INHALATION RT-Q6H PRN


     PRN Reason: Shortness Of Breath


   Acetaminophen Tab [Tylenol] 1,000 mg PO Q6HR PRN


     PRN Reason: Pain


   Oxymetazoline HCl [Afrin 0.05%] 1 spray EA NOSTRIL DAILY PRN


     PRN Reason: Allergy Symptoms





Discontinued


   Furosemide [Lasix] 40 mg PO BID


   Naproxen 500 mg PO BID PRN


     PRN Reason: Pain


   metOLazone [Zaroxolyn] 5 mg PO DAILY


Discharge Medication List





Sulfamethox-Tmp 800-160Mg [Bactrim -160 mg] 1 tab PO Q12HR 07/19/16 

[History]


Acetaminophen Tab [Tylenol] 1,000 mg PO Q6HR PRN 09/03/21 [History]


Albuterol Sulfate [Ventolin HFA] 1 - 2 puff INHALATION RT-Q6H PRN 09/03/21 

[History]


Oxymetazoline HCl [Afrin 0.05%] 1 spray EA NOSTRIL DAILY PRN 09/03/21 [History]


Albuterol Inhaler [Ventolin Hfa Inhaler] 1 puff INHALATION RT-TID #8 gm 09/09/21

[Rx]


Budesonide [Pulmicort] 1 mg INHALATION RT-BID #30 ml 09/09/21 [Rx]


Cefdinir [Omnicef] 300 mg PO BID 5 Days #10 cap 09/09/21 [Rx]


Ferrous Sulfate [Iron (65 MG Elemental)] 325 mg PO BID-W/MEALS #60 tab 09/09/21 

[Rx]


Hydrocortisone Pr Cream [Proctosol-Hc 2.5%] 1 applic RECTAL BID 3 Days #1 tub 

09/09/21 [Rx]


Nicotine 14Mg/24Hr Patch [Habitrol] 1 patch TRANSDERM DAILY #3 patch 09/09/21 

[Rx]


Pantoprazole [Protonix] 40 mg PO BID #60 tab 09/09/21 [Rx]


predniSONE 10 mg PO AS DIRECTED #18 tab 09/09/21 [Rx]








Follow up Appointment(s)/Referral(s): 


Jesica Arora MD [Primary Care Provider] - 09/15/21 5:00 pm


Michelle Saldaña MD [STAFF PHYSICIAN] - 09/14/21 3:15 pm


()


Santy Marin DO [Doctor of Osteopathic Medicine] - 10/18/21 1:15 pm


[facility] Visiting Nurse, [NON-STAFF] - 1-2 Days


Patient Instructions/Handouts:  Gastrointestinal Bleeding (DC), Colorectal 

Polyps (DC), Colonoscopy (DC), Upper Endoscopy (DC)


Activity/Diet/Wound Care/Special Instructions: 


Repeat colonoscopy in 2 years, 2023





heart healthy diet 





activity is restricted till you see your doctor 








** avoid NSAIDs ,like no ibuprofen, no motrin or aspirin, no mobic or naproxen. 

talk to your doctor before taking any pain medication please 


Discharge Disposition: HOME SELF-CARE

## 2022-03-22 ENCOUNTER — HOSPITAL ENCOUNTER (OUTPATIENT)
Dept: HOSPITAL 47 - RADXRMAIN | Age: 62
Discharge: HOME | End: 2022-03-22
Attending: SURGERY
Payer: MEDICARE

## 2022-03-22 DIAGNOSIS — R91.8: ICD-10-CM

## 2022-03-22 DIAGNOSIS — I51.7: Primary | ICD-10-CM

## 2022-03-22 PROCEDURE — 71046 X-RAY EXAM CHEST 2 VIEWS: CPT

## 2022-03-22 NOTE — P.PN
Progress Note - Text


Progress Note Date: 03/22/22


Patient notified of results of chest xray. Patient reports that PCP will get CT 

scan.

## 2022-03-22 NOTE — XR
EXAMINATION TYPE: XR chest 2V

 

DATE OF EXAM: 3/22/2022

 

COMPARISON: Chest x-ray September 6, 2021 and older studies.

 

HISTORY: History of COPD with difficulty in breathing

 

TECHNIQUE:  Frontal and lateral views of the chest are obtained.

 

FINDINGS:  There is background chronic emphysematous and parenchymal change with bibasilar increased 
opacities. Left upper lung shows persistent focal masslike opacity similar to prior studies September 2021 . Persistent cardiomegaly. The cardiac silhouette size is within normal limits.   The osseous s
tructures are intact.

 

IMPRESSION:  Chronic changes and cardiomegaly with bibasilar opacities could reflect acute infiltrate
 and/or atelectasis. More suspicious left apical pleural thickening and masslike consolidation has no
t improved since September 2021. Underlying mass or neoplasm needs to be considered. Follow-up chest 
CT advised to further evaluate.

## 2022-03-23 ENCOUNTER — HOSPITAL ENCOUNTER (OUTPATIENT)
Dept: HOSPITAL 47 - RADCTMAIN | Age: 62
Discharge: HOME | End: 2022-03-23
Attending: FAMILY MEDICINE
Payer: MEDICARE

## 2022-03-23 DIAGNOSIS — R91.1: Primary | ICD-10-CM

## 2022-03-23 LAB
ALBUMIN SERPL-MCNC: 3.8 G/DL (ref 3.5–5)
ALBUMIN/GLOB SERPL: 0.8 {RATIO}
ALP SERPL-CCNC: 130 U/L (ref 38–126)
ALT SERPL-CCNC: 16 U/L (ref 4–49)
ANION GAP SERPL CALC-SCNC: 5 MMOL/L
AST SERPL-CCNC: 19 U/L (ref 17–59)
BUN SERPL-SCNC: 22 MG/DL (ref 9–20)
CALCIUM SPEC-MCNC: 8.9 MG/DL (ref 8.4–10.2)
CHLORIDE SERPL-SCNC: 86 MMOL/L (ref 98–107)
CO2 SERPL-SCNC: 38 MMOL/L (ref 22–30)
GLOBULIN SER CALC-MCNC: 5 G/DL
GLUCOSE SERPL-MCNC: 481 MG/DL (ref 74–99)
POTASSIUM SERPL-SCNC: 4.3 MMOL/L (ref 3.5–5.1)
PROT SERPL-MCNC: 8.8 G/DL (ref 6.3–8.2)
SODIUM SERPL-SCNC: 129 MMOL/L (ref 137–145)

## 2022-03-23 PROCEDURE — 80053 COMPREHEN METABOLIC PANEL: CPT

## 2022-03-23 PROCEDURE — 36415 COLL VENOUS BLD VENIPUNCTURE: CPT

## 2022-03-23 PROCEDURE — 71260 CT THORAX DX C+: CPT

## 2022-03-23 NOTE — CT
EXAMINATION TYPE: CT chest w con

 

DATE OF EXAM: 3/23/2022

 

COMPARISON: CT dated 1/25/2011

 

HISTORY: abnormal chest x-ray

 

CT DLP: 905.4 mGycm

Automated exposure control for dose reduction was used.

 

TECHNIQUE: 

CT scan of the chest is performed with IV Contrast, patient injected with 100 mL of Isovue 300.

 

FINDINGS:

 

LUNGS: Suspicious aggressive lesion is seen in the left lung apex measuring 6.5 x 7.7 x 9 cm. It caus
es destruction of the adjacent portion of the first and second ribs with partial destruction of the a
djacent portion of the left third rib. There is extension outside the bony chest wall into the suprac
lavicular/ axillary region being inseparable from the inferior aspect of the left subclavian and axil
theo artery. Multiple adjacent left axillary lymph nodes are seen measuring up to 12 mm. Supraclavicu
lar lymph nodes are also noted, subcentimeter in size. This is highly suspicious for a lung cancer/ P
ancoast tumor. Background COPD changes with centrilobular changes mainly in the upper lobes, probably
 smoking-related. 7 mm mixed density nodule is seen at the posterior aspect of the right lower lobe, 
appreciated previously. Fibrotic changes/atelectatic changes are seen in the left upper lobe adjacent
 to the lesion. No other definite lung nodule identified. Patent trachea and main bronchi. No pleural
 effusion.

 

MEDIASTINUM: No gross cardiomegaly. Coronary and arterial atherosclerotic calcifications. No major or
 central pulmonary embolism. No pericardial effusion. No pathologically enlarged hilar or mediastinal
 lymph nodes. 

 

OTHER:  Unremarkable upper abdomen. Bilateral mild gynecomastia changes. No other aggressive bone les
ion.

 

IMPRESSION:  

The above described left apical aggressive lung lesion is highly suspicious for a locally infiltratin
g lung cancer/Pancoast tumor as detailed above. Recommend thoracic surgery consultation, further PET 
scan assessment and tissue diagnosis. Other findings as described above.

 

A [place] critical message alert has been initiated for Jesica Arora MD via the [employer] Critical Results System on 3/23/2022 1:28 PM.  This message alert has been sent to Jesica Arora MD via the preferences provided by the clinician for the receipt of Radiology Critical Findings. Mess
age ID [id number].

## 2022-04-01 ENCOUNTER — HOSPITAL ENCOUNTER (OUTPATIENT)
Dept: HOSPITAL 47 - RADPETMAIN | Age: 62
Discharge: HOME | End: 2022-04-01
Attending: FAMILY MEDICINE
Payer: MEDICARE

## 2022-04-01 DIAGNOSIS — R91.8: Primary | ICD-10-CM

## 2022-04-01 DIAGNOSIS — R59.0: ICD-10-CM

## 2022-04-01 PROCEDURE — 78815 PET IMAGE W/CT SKULL-THIGH: CPT

## 2022-04-02 NOTE — PE
EXAMINATION TYPE: PET CT fusion skull to thigh

 

DATE OF EXAM: 4/1/2022

 

COMPARISON: NONE

 

HISTORY: Solitary pulmonary nodule, abnormal CT.   

 

TECHNIQUE:  Following the intravenous administration of 8.95 mCi of F-18 FDG, whole body images are p
erformed from the skull base to the midthigh.  Images are reviewed on the computer in the coronal, ax
ial, and sagittal planes.  Reconstructed rotating images are created on independent workstation and r
eviewed on the computer.   A localization and attenuation correction CT is performed in conjunction w
ith the PET scan. Blood glucose level was 194.

 

SCAN: Initial Scan

 

FINDINGS: Evaluation is suboptimal secondary to large body habitus.

 

SKULL BASE AND NECK AND CHEST:  Left supraclavicular heterogeneous mass with internal air measures ap
proximately 8.2 x 6.6 cm has abnormal hypermetabolic uptake and is destroying portions of the anterio
r first through third ribs is consistent with neoplasm. The max SUV is 10.29 on axial image 73

 

There is abnormal 1.9 x 1.1 cm mildly hypermetabolic lower left axillary lymph node axial image 95, m
ax SUV is 3.71.

 

No additional areas of abnormal hypermetabolic uptake. Background mild to moderate underlying emphyse
matous change is present.

 

ABDOMEN AND PELVIS: Normal excretion. Nonspecific mild uptake in the bowel from the hepatic flexure t
hrough the rectum. No adrenal masses noted.

 

OSSEOUS STRUCTURES: No abnormal hypermetabolic uptake.

 

OTHER CT: Nasal septum is deviated to right of midline. Mild calcified plaque bilateral carotid bulb 
level. Calcification at the level of the mitral and aortic valves. Coronary artery calcification is p
resent.

 

 

IMPRESSION: Pleural or chest wall based destructive mass or neoplasm left supraclavicular region with
 inferior left thoracic adenopathy. No metastatic disease is seen.

## 2022-04-15 ENCOUNTER — HOSPITAL ENCOUNTER (OUTPATIENT)
Dept: HOSPITAL 47 - RADMRIMAIN | Age: 62
Discharge: HOME | End: 2022-04-15
Attending: INTERNAL MEDICINE
Payer: MEDICARE

## 2022-04-15 DIAGNOSIS — C34.90: Primary | ICD-10-CM

## 2022-04-15 DIAGNOSIS — R90.89: ICD-10-CM

## 2022-04-15 PROCEDURE — 70553 MRI BRAIN STEM W/O & W/DYE: CPT

## 2022-04-16 NOTE — MR
EXAMINATION TYPE: MR brain wo/w con

 

DATE OF EXAM: 4/15/2022

 

COMPARISON: None

 

HISTORY: Hx of lung CA

 

CONTRAST: 

Standard multiplanar, multisequence MRI departmental protocol images were obtained without contrast a
nd with 14ml mL intravenous Gadavist gadolinium contrast.  

 

 

There is cerebral cortical atrophy. There is no mass effect or midline shift. There is no sign of int
racranial hemorrhage. Diffusion images show no evidence of an acute infarct. Brainstem appears intact
. There is 1.5 cm wedge-shaped area of increased signal on the right posterior frontal lobe cortex on
 the T2 and FLAIR images. There are scattered small foci of increased signal adjacent to the lateral 
ventricles on the T2 and FLAIR images. There is a mixed signal linear area of abnormality in the left
 internal capsule that measures 17 x 4 mm. This could be small old hemorrhagic lacunar infarct. These
 measure up to 4 mm. There is no evidence of posterior fossa mass.

 

The corpus callosum is intact. Sella turcica is intact. There is no evidence of orbital mass.

 

Contrast images show no pathologic enhancement. There is normal enhancement of the venous sinuses.

 

IMPRESSION:

. Small wedge-shaped area of increased signal in the right posterior frontal lobe cortex could be old
 infarct. No enhancement seen to suggest metastatic disease.

 

Left internal capsule lesion could be old hemorrhagic lacunar infarct.

 

White matter signal changes around the lateral ventricles likely related to age-related white matter 
disease.

## 2022-04-19 ENCOUNTER — HOSPITAL ENCOUNTER (OUTPATIENT)
Dept: HOSPITAL 47 - RADPROMAIN | Age: 62
Discharge: HOME | End: 2022-04-19
Attending: INTERNAL MEDICINE
Payer: MEDICARE

## 2022-04-19 DIAGNOSIS — C34.90: Primary | ICD-10-CM

## 2022-04-26 ENCOUNTER — HOSPITAL ENCOUNTER (OUTPATIENT)
Dept: HOSPITAL 47 - RADPROMAIN | Age: 62
Discharge: HOME | End: 2022-04-26
Attending: INTERNAL MEDICINE
Payer: MEDICARE

## 2022-04-26 VITALS — SYSTOLIC BLOOD PRESSURE: 132 MMHG | HEART RATE: 81 BPM | DIASTOLIC BLOOD PRESSURE: 71 MMHG

## 2022-04-26 VITALS — TEMPERATURE: 98.1 F

## 2022-04-26 VITALS — RESPIRATION RATE: 16 BRPM

## 2022-04-26 DIAGNOSIS — C34.12: Primary | ICD-10-CM

## 2022-04-26 LAB
INR PPP: 1 (ref ?–1.2)
PLATELET # BLD AUTO: 702 K/UL (ref 150–450)
PT BLD: 10.8 SEC (ref 9–12)

## 2022-04-26 PROCEDURE — 32408 CORE NDL BX LNG/MED PERQ: CPT

## 2022-04-26 PROCEDURE — 82947 ASSAY GLUCOSE BLOOD QUANT: CPT

## 2022-04-26 PROCEDURE — 71045 X-RAY EXAM CHEST 1 VIEW: CPT

## 2022-04-26 PROCEDURE — 85049 AUTOMATED PLATELET COUNT: CPT

## 2022-04-26 PROCEDURE — 85610 PROTHROMBIN TIME: CPT

## 2022-04-26 PROCEDURE — 36415 COLL VENOUS BLD VENIPUNCTURE: CPT

## 2022-04-26 PROCEDURE — 88341 IMHCHEM/IMCYTCHM EA ADD ANTB: CPT

## 2022-04-26 PROCEDURE — 77012 CT SCAN FOR NEEDLE BIOPSY: CPT

## 2022-04-26 PROCEDURE — 88305 TISSUE EXAM BY PATHOLOGIST: CPT

## 2022-04-26 PROCEDURE — 88342 IMHCHEM/IMCYTCHM 1ST ANTB: CPT

## 2022-04-26 NOTE — XR
EXAMINATION TYPE: XR chest 1V portable

 

DATE OF EXAM: 4/26/2022

 

COMPARISON: Chest x-ray dated 3/22/2022

 

HISTORY: Status post left lung biopsy

 

TECHNIQUE: Single frontal view of the chest is obtained.

 

FINDINGS:  There is no significant interval change.

 

IMPRESSION:  No evident complication status post biopsy of left upper lobe lung mass

## 2022-04-26 NOTE — XR
EXAMINATION TYPE: XR chest 1V portable

 

DATE OF EXAM: 4/26/2022

 

COMPARISON: Chest x-ray same dated earlier time

 

HISTORY: Post lung biopsy

 

TECHNIQUE: Single frontal view of the chest is obtained.

 

FINDINGS:  No interval change

 

IMPRESSION:  No evident complication status post left lung biopsy.

## 2022-04-26 NOTE — CT
EXAMINATION TYPE: CT biopsy lung LT

 

DATE OF EXAM: 4/26/2022

 

HISTORY: Lung mass

 

COMPARISON: Nuclear medicine PET/CT 4/1/2022

 

Maximal barrier technique was utilized, hand hygiene obtained with soap and water.  The skin overlyin
g a suitable path to the lesion in the left upper lobe invading the chest wall was localized using CT
 and the overlying skin was prepped and draped.  Lidocaine used for local anesthesia.  A skin nick ma
de with a scalpel.  Using CT guidance, access was gained to the lesion with a 18-gauge core needle th
rough a 17-gauge guide.  Core specimen submitted to cytology.  2 pass(es) performed in all.  Followin
g the procedure no immediate complications.   The patient is discharged in stable condition.  Hemosta
sis achieved.  

 

IMPRESSION: 

 

SUCCESSFUL CT GUIDED BIOPSY.  PATHOLOGY PENDING.  THIS PROCEDURE WAS PERFORMED BY THE UNDERSIGNED.

## 2022-06-08 ENCOUNTER — HOSPITAL ENCOUNTER (OUTPATIENT)
Dept: HOSPITAL 47 - RADXRMAIN | Age: 62
Discharge: HOME | End: 2022-06-08
Attending: FAMILY MEDICINE
Payer: MEDICARE

## 2022-06-08 DIAGNOSIS — S49.92XA: Primary | ICD-10-CM

## 2022-06-08 DIAGNOSIS — M75.92: ICD-10-CM

## 2022-06-08 NOTE — XR
EXAMINATION TYPE: XR humerus LT

 

DATE OF EXAM: 6/8/2022

 

COMPARISON: NONE

 

HISTORY: pain

 

TECHNIQUE: 2 views submitted.

 

FINDINGS:

The osseous structures are intact and the joint spaces are preserved.  Hypertrophic change of the AC 
joint. Small intraosseous lesion proximal left humerus. Small intraosseous lesion proximal left humer
us.

 

IMPRESSION:

1. No acute fracture or dislocation.  

2. AC joint arthropathy. Olecranon spur noted. There is a small intraosseous lesion of the proximal l
eft humerus could represent a small bone cyst.

## 2022-06-12 NOTE — XR
EXAMINATION TYPE: XR elbow complete LT

 

DATE OF EXAM: 6/8/2022

 

COMPARISON: NONE

 

HISTORY: 61-year-old male S49.92XA unspecified injury upper arm

 

TECHNIQUE: 3 views

 

FINDINGS:

Mild spurring in both medial and lateral epicondyles. No elbow joint effusion. Minimal enthesopathy a
t the triceps insertion on the olecranon. No acute fracture, subluxation, or dislocation.

 

 

IMPRESSION:

Mild spurring at both medial and lateral epicondyles suggests some tendinopathy at the common flexor 
and common extensor tendon origins. No acute osseous abnormality seen.

## 2022-07-18 ENCOUNTER — HOSPITAL ENCOUNTER (OUTPATIENT)
Dept: HOSPITAL 47 - RADUSWWP | Age: 62
Discharge: HOME | End: 2022-07-18
Attending: FAMILY MEDICINE
Payer: MEDICARE

## 2022-07-18 DIAGNOSIS — R10.9: Primary | ICD-10-CM

## 2022-07-18 PROCEDURE — 76770 US EXAM ABDO BACK WALL COMP: CPT

## 2022-07-18 NOTE — US
EXAMINATION TYPE: US kidneys/renal and bladder

 

DATE OF EXAM: 7/18/2022

 

COMPARISON: NONE

 

CLINICAL HISTORY: R10.9 UNSPECIFIED ABDOMINAL PAIN. left flank pain, patient scanned sitting upright

 

EXAM MEASUREMENTS:

 

Right Kidney:  11.5 x 4.9 x 6.1 cm

Left Kidney: 10.6 x 3.1 x 6.1 cm

 

 

Right Kidney: No hydronephrosis or masses seen  

Left Kidney: No hydronephrosis or masses seen  

Bladder: not distended 

 

There is no evidence for hydronephrosis at this point in time.  No nephrolithiasis is seen.  No elroy
s are identified.  The urinary bladder is anechoic.  Bilateral ureteral jets are seen.

 

 

 

IMPRESSION:

 

No discrete abnormality seen.

## 2022-08-01 ENCOUNTER — HOSPITAL ENCOUNTER (OUTPATIENT)
Dept: HOSPITAL 47 - RADCTMAIN | Age: 62
Discharge: HOME | End: 2022-08-01
Attending: INTERNAL MEDICINE
Payer: MEDICARE

## 2022-08-01 DIAGNOSIS — C34.92: Primary | ICD-10-CM

## 2022-08-01 LAB — BUN SERPL-SCNC: 7 MG/DL (ref 9–20)

## 2022-08-01 PROCEDURE — 82565 ASSAY OF CREATININE: CPT

## 2022-08-01 PROCEDURE — 71260 CT THORAX DX C+: CPT

## 2022-08-01 PROCEDURE — 36415 COLL VENOUS BLD VENIPUNCTURE: CPT

## 2022-08-01 PROCEDURE — 84520 ASSAY OF UREA NITROGEN: CPT

## 2022-08-01 NOTE — CT
EXAMINATION TYPE: CT chest w con

 

DATE OF EXAM: 8/1/2022

 

COMPARISON: Prior chest CT March 23, 2022 and PET/CT April 1, 2022

 

HISTORY: lung CA left-sided diagnosed in April currently undergoing chemotherapy and radiation treatm
ent.

 

CT DLP: 682.3 mGycm.   Automated Exposure Control for Dose Reduction was Utilized.

 

TECHNIQUE:  CT scan of the thorax is performed following with IV Contrast, patient injected with 70 m
L of Isovue 300.  

 

FINDINGS:

 

LUNGS: Background moderate underlying emphysematous change is redemonstrated. Redemonstration of left
 apical heterogeneous mass measuring 8.3 x 5.9 cm axial image 11 versus most recent PET/CT with loss 
of more central aeration noted. Anterior-inferior extension with some left apical volume loss is now 
present. New encasement of portion of the left subclavian artery axial image 10 is noted. Findings thakkar
ggest interval neoplastic progression L3 superiorly. This is destroying portions of the left first se
cond and third ribs greater degree versus prior. New slight displaced fracture deformity of the later
al left fourth rib axial image 18 is presumed pathologic.

 

 

There is however interval development of multiple small pulmonary nodules bilaterally some which have
 some cavitation. For reference there is new 0.9 x 1.5 cm posterior left lower lobe nodule axial imag
e 46. For reference there is new 1.9 x 1.3 cm anterior right mid lung nodule abutting the mediastinum
 axial image 37. There is bovine type aortic arch which is normal variant redemonstrated.

 

 MEDIASTINUM: There are no new greater than 1 cm hilar or mediastinal lymph nodes.   No pericardial e
ffusion is seen.  Calcification at level of the mitral and aortic valve redemonstrated.

 

OTHER: Right-sided gynecomastia redemonstrated. Left breast L5 field of view.

 

IMPRESSION: Significant neoplastic progression identified as detailed above as there is bilateral new
 hematogenous metastatic disease or nodules present.

## 2022-09-12 ENCOUNTER — HOSPITAL ENCOUNTER (EMERGENCY)
Dept: HOSPITAL 47 - EC | Age: 62
LOS: 1 days | Discharge: HOME | End: 2022-09-13
Payer: MEDICARE

## 2022-09-12 DIAGNOSIS — Z79.51: ICD-10-CM

## 2022-09-12 DIAGNOSIS — J45.909: ICD-10-CM

## 2022-09-12 DIAGNOSIS — I10: ICD-10-CM

## 2022-09-12 DIAGNOSIS — J44.9: ICD-10-CM

## 2022-09-12 DIAGNOSIS — E11.9: ICD-10-CM

## 2022-09-12 DIAGNOSIS — Z87.891: ICD-10-CM

## 2022-09-12 DIAGNOSIS — R44.3: Primary | ICD-10-CM

## 2022-09-12 DIAGNOSIS — Z79.899: ICD-10-CM

## 2022-09-12 DIAGNOSIS — Z79.84: ICD-10-CM

## 2022-09-12 PROCEDURE — 99285 EMERGENCY DEPT VISIT HI MDM: CPT

## 2022-09-12 PROCEDURE — 81001 URINALYSIS AUTO W/SCOPE: CPT

## 2022-09-12 PROCEDURE — 70450 CT HEAD/BRAIN W/O DYE: CPT

## 2022-09-13 VITALS — RESPIRATION RATE: 16 BRPM

## 2022-09-13 VITALS — SYSTOLIC BLOOD PRESSURE: 136 MMHG | HEART RATE: 81 BPM | DIASTOLIC BLOOD PRESSURE: 79 MMHG

## 2022-09-13 VITALS — TEMPERATURE: 97.8 F

## 2022-09-13 LAB
BILIRUB UR QL STRIP.AUTO: (no result)
HYALINE CASTS UR QL AUTO: 5 /LPF (ref 0–2)
PH UR: 6 [PH] (ref 5–8)
PROT UR QL: (no result)
RBC UR QL: 1 /HPF (ref 0–5)
SP GR UR: 1.03 (ref 1–1.03)
SQUAMOUS UR QL AUTO: 1 /HPF (ref 0–4)
UROBILINOGEN UR QL STRIP: 6 MG/DL (ref ?–2)
WBC # UR AUTO: 3 /HPF (ref 0–5)

## 2022-09-13 NOTE — CT
EXAMINATION TYPE: CT brain wo con

 

DATE OF EXAM: 9/13/2022

 

COMPARISON: None

 

HISTORY: AMS

 

CT DLP: 1149.4 mGycm

Automated exposure control for dose reduction was used.

 

Images of the brain obtained with no contrast.

 

There is mild cerebral cortical atrophy. There is no mass effect nor midline shift. No sign of intrac
ranial hemorrhage. Calvarium is intact. Skull base is intact. There is normal aeration of the mastoid
 sinuses.

 

IMPRESSION:

Mild atrophy. No acute intracranial abnormality.

## 2022-09-13 NOTE — ED
General Adult HPI





- General


Chief complaint: Altered Mental Status


Stated complaint: Seeing things that arnt there


Time Seen by Provider: 09/13/22 00:45


Source: patient, family, RN notes reviewed, old records reviewed


Mode of arrival: wheelchair


Limitations: no limitations





- History of Present Illness


Initial comments: 





61-year-old male presents for evaluation of hallucination.  Patient states he 

had had some issues with hallucination during recent medication changes.  He's 

been switched from Norco to Percocet and had his morphine prescription first 

raised to 60 mg and then reduced to 45 mg.  He's had some visual hallucinations 

including the sensation that his feet were in water.  Patient is alert and 

oriented at the time my evaluation.  There was concern that he had developed 

metastases to the brain as he has a history of lung cancer currently undergoing 

treatment.





- Related Data


                                Home Medications











 Medication  Instructions  Recorded  Confirmed


 


Acetaminophen Tab [Tylenol] 1,000 mg PO Q6HR PRN 09/03/21 05/18/22


 


Albuterol Sulfate [Ventolin HFA] 1 - 2 puff INHALATION RT-Q4H PRN 09/03/21 05/18/22


 


Oxymetazoline HCl [Afrin 0.05%] 1 spray EA NOSTRIL DAILY PRN 09/03/21 05/18/22


 


Diclofenac Sodium Gel [Voltaren 4 gm TOPICAL QID PRN 04/11/22 05/18/22





Gel]   


 


Furosemide [Lasix] 40 mg PO DAILY PRN 04/11/22 05/18/22


 


Pantoprazole [Protonix] 40 mg PO DAILY PRN 04/11/22 05/18/22


 


Pioglitazone [Actos] 15 mg PO DAILY 04/11/22 05/18/22


 


metFORMIN HCL [Glucophage] 1,000 mg PO BID 04/11/22 05/18/22


 


metOLazone [Zaroxolyn] 5 mg PO DAILY 04/11/22 05/18/22


 


HYDROcodone/APAP 10-325MG [Norco 1 tab PO Q6HR PRN 04/18/22 05/18/22





]   


 


Morphine Sulfate [Ms Contin] 45 mg PO Q12HR 04/18/22 05/18/22


 


Meloxicam 7.5 mg PO DAILY 04/19/22 05/18/22








                                  Previous Rx's











 Medication  Instructions  Recorded


 


Albuterol Inhaler [Ventolin Hfa 1 puff INHALATION RT-TID #8 gm 09/09/21





Inhaler]  











                                    Allergies











Allergy/AdvReac Type Severity Reaction Status Date / Time


 


No Known Allergies Allergy   Verified 09/12/22 21:23














Review of Systems


ROS Statement: 


Those systems with pertinent positive or pertinent negative responses have been 

documented in the HPI.





ROS Other: All systems not noted in ROS Statement are negative.





Past Medical History


Past Medical History: Asthma, Heart Failure, COPD, Diabetes Mellitus, 

Hypertension


Additional Past Medical History / Comment(s): Uses home O2 as needed only. Lung 

mass, rib lesion and lymph adenopathy, shoulder pain related to lung mass. 

Pancous tumor left apex


History of Any Multi-Drug Resistant Organisms: MRSA


Date of last positivie culture/infection: 6/23/16


MDRO Source:: Left Foot


Past Surgical History: Hernia Repair, Orthopedic Surgery, Tonsillectomy


Additional Past Surgical History / Comment(s): lt. 5th toe amp


Past Anesthesia/Blood Transfusion Reactions: No Reported Reaction


Past Psychological History: No Psychological Hx Reported


Smoking Status: Former smoker


Past Alcohol Use History: None Reported


Past Drug Use History: None Reported





- Past Family History


  ** Sister(s)


Family Medical History: Cancer





  ** Father


Family Medical History: Congestive Heart Failure (CHF), Diabetes Mellitus





  ** Mother


Family Medical History: Renal Disease





General Exam


Limitations: no limitations


General appearance: alert, in no apparent distress


Head exam: Present: atraumatic, normocephalic


Eye exam: Present: normal appearance


ENT exam: Present: normal exam


Neck exam: Present: normal inspection.  Absent: tenderness, meningismus


Respiratory exam: Present: normal lung sounds bilaterally.  Absent: respiratory 

distress, wheezes


Cardiovascular Exam: Present: regular rate, normal rhythm


GI/Abdominal exam: Present: soft.  Absent: distended, tenderness


Neurological exam: Present: alert, oriented X3, CN II-XII intact.  Absent: motor

sensory deficit


Skin exam: Present: warm, dry, intact.  Absent: cyanosis, diaphoretic





Course


                                   Vital Signs











  09/12/22 09/12/22 09/13/22





  21:16 21:23 02:00


 


Temperature 98.4 F 97.8 F 


 


Pulse Rate 97 89 84


 


Respiratory 22 17 16





Rate   


 


Blood Pressure 141/82 133/78 137/70


 


O2 Sat by Pulse 95 97 95





Oximetry   














  09/13/22





  02:21


 


Temperature 


 


Pulse Rate 81


 


Respiratory 16





Rate 


 


Blood Pressure 136/79


 


O2 Sat by Pulse 98





Oximetry 














Medical Decision Making





- Medical Decision Making





61-year-old male with request for brain imaging with chief complaint of 

hallucination and concern for metastatic lesions to the brain.  I did perform CT

imaging in the emergency department which is negative for mass effect or acute 

findings.  Patient is reassured by this.  He is informed that this is not the 

best test and that he may require further testing as an outpatient.  He did not 

have urinary symptoms by did obtain a urinalysis which was unremarkable.  

Additionally the patient had blood testing earlier this week during his symptoms

and he reported this as stable.  These results were not available for my review.

 Patient informed of CT imaging results and eager for discharge.





- Lab Data


                                   Lab Results











  09/13/22 Range/Units





  01:35 


 


Urine Color  Yellow  


 


Urine Appearance  Clear  (Clear)  


 


Urine pH  6.0  (5.0-8.0)  


 


Ur Specific Gravity  1.035  (1.001-1.035)  


 


Urine Protein  1+ H  (Negative)  


 


Urine Glucose (UA)  Negative  (Negative)  


 


Urine Ketones  Trace H  (Negative)  


 


Urine Blood  Negative  (Negative)  


 


Urine Nitrite  Negative  (Negative)  


 


Urine Bilirubin  1+ H  (Negative)  


 


Urine Urobilinogen  6.0  (<2.0)  mg/dL


 


Ur Leukocyte Esterase  Negative  (Negative)  


 


Urine RBC  1  (0-5)  /hpf


 


Urine WBC  3  (0-5)  /hpf


 


Ur Squamous Epith Cells  1  (0-4)  /hpf


 


Hyaline Casts  5 H  (0-2)  /lpf


 


Urine Mucus  Moderate H  (None)  /hpf














Disposition


Clinical Impression: 


 Hallucination





Disposition: HOME SELF-CARE


Condition: Fair


Instructions (If sedation given, give patient instructions):  Hallucinations 

(ED)


Is patient prescribed a controlled substance at d/c from ED?: No


Referrals: 


Jesica Arora MD [Primary Care Provider] - 1-2 days


Time of Disposition: 02:20

## 2022-09-16 ENCOUNTER — HOSPITAL ENCOUNTER (OUTPATIENT)
Dept: HOSPITAL 47 - EC | Age: 62
Setting detail: OBSERVATION
LOS: 2 days | Discharge: HOME | End: 2022-09-18
Attending: INTERNAL MEDICINE | Admitting: INTERNAL MEDICINE
Payer: MEDICARE

## 2022-09-16 DIAGNOSIS — Z87.891: ICD-10-CM

## 2022-09-16 DIAGNOSIS — Z79.1: ICD-10-CM

## 2022-09-16 DIAGNOSIS — I11.0: ICD-10-CM

## 2022-09-16 DIAGNOSIS — I50.30: ICD-10-CM

## 2022-09-16 DIAGNOSIS — Z82.49: ICD-10-CM

## 2022-09-16 DIAGNOSIS — D75.839: ICD-10-CM

## 2022-09-16 DIAGNOSIS — Z79.899: ICD-10-CM

## 2022-09-16 DIAGNOSIS — E11.9: ICD-10-CM

## 2022-09-16 DIAGNOSIS — Z83.3: ICD-10-CM

## 2022-09-16 DIAGNOSIS — C34.92: ICD-10-CM

## 2022-09-16 DIAGNOSIS — D64.9: ICD-10-CM

## 2022-09-16 DIAGNOSIS — J45.909: ICD-10-CM

## 2022-09-16 DIAGNOSIS — R44.3: ICD-10-CM

## 2022-09-16 DIAGNOSIS — G89.3: Primary | ICD-10-CM

## 2022-09-16 DIAGNOSIS — Z79.84: ICD-10-CM

## 2022-09-16 PROCEDURE — 83036 HEMOGLOBIN GLYCOSYLATED A1C: CPT

## 2022-09-16 PROCEDURE — 96376 TX/PRO/DX INJ SAME DRUG ADON: CPT

## 2022-09-16 PROCEDURE — 85027 COMPLETE CBC AUTOMATED: CPT

## 2022-09-16 PROCEDURE — 85025 COMPLETE CBC W/AUTO DIFF WBC: CPT

## 2022-09-16 PROCEDURE — 36415 COLL VENOUS BLD VENIPUNCTURE: CPT

## 2022-09-16 PROCEDURE — 96361 HYDRATE IV INFUSION ADD-ON: CPT

## 2022-09-16 PROCEDURE — 85730 THROMBOPLASTIN TIME PARTIAL: CPT

## 2022-09-16 PROCEDURE — 96374 THER/PROPH/DIAG INJ IV PUSH: CPT

## 2022-09-16 PROCEDURE — 96372 THER/PROPH/DIAG INJ SC/IM: CPT

## 2022-09-16 PROCEDURE — 80053 COMPREHEN METABOLIC PANEL: CPT

## 2022-09-16 PROCEDURE — 83735 ASSAY OF MAGNESIUM: CPT

## 2022-09-16 PROCEDURE — 84484 ASSAY OF TROPONIN QUANT: CPT

## 2022-09-16 PROCEDURE — 83880 ASSAY OF NATRIURETIC PEPTIDE: CPT

## 2022-09-16 PROCEDURE — 81001 URINALYSIS AUTO W/SCOPE: CPT

## 2022-09-16 PROCEDURE — 93005 ELECTROCARDIOGRAM TRACING: CPT

## 2022-09-16 PROCEDURE — 85610 PROTHROMBIN TIME: CPT

## 2022-09-16 PROCEDURE — 99284 EMERGENCY DEPT VISIT MOD MDM: CPT

## 2022-09-16 PROCEDURE — 96375 TX/PRO/DX INJ NEW DRUG ADDON: CPT

## 2022-09-16 PROCEDURE — 84100 ASSAY OF PHOSPHORUS: CPT

## 2022-09-17 LAB
ALBUMIN SERPL-MCNC: 3.3 G/DL (ref 3.5–5)
ALP SERPL-CCNC: 110 U/L (ref 38–126)
ALT SERPL-CCNC: 13 U/L (ref 4–49)
ANION GAP SERPL CALC-SCNC: 11 MMOL/L
APTT BLD: 31.9 SEC (ref 22–30)
AST SERPL-CCNC: 23 U/L (ref 17–59)
BASOPHILS # BLD AUTO: 0 K/UL (ref 0–0.2)
BASOPHILS NFR BLD AUTO: 0 %
BUN SERPL-SCNC: 11 MG/DL (ref 9–20)
CALCIUM SPEC-MCNC: 9 MG/DL (ref 8.4–10.2)
CHLORIDE SERPL-SCNC: 95 MMOL/L (ref 98–107)
CO2 SERPL-SCNC: 30 MMOL/L (ref 22–30)
EOSINOPHIL # BLD AUTO: 0.2 K/UL (ref 0–0.7)
EOSINOPHIL NFR BLD AUTO: 3 %
ERYTHROCYTE [DISTWIDTH] IN BLOOD BY AUTOMATED COUNT: 3.72 M/UL (ref 4.3–5.9)
ERYTHROCYTE [DISTWIDTH] IN BLOOD: 16.1 % (ref 11.5–15.5)
GLUCOSE BLD-MCNC: 110 MG/DL (ref 70–110)
GLUCOSE BLD-MCNC: 135 MG/DL (ref 70–110)
GLUCOSE BLD-MCNC: 145 MG/DL (ref 70–110)
GLUCOSE SERPL-MCNC: 103 MG/DL (ref 74–99)
HCT VFR BLD AUTO: 33.4 % (ref 39–53)
HGB BLD-MCNC: 9.9 GM/DL (ref 13–17.5)
INR PPP: 1 (ref ?–1.2)
LYMPHOCYTES # SPEC AUTO: 1.3 K/UL (ref 1–4.8)
LYMPHOCYTES NFR SPEC AUTO: 15 %
MAGNESIUM SPEC-SCNC: 2 MG/DL (ref 1.6–2.3)
MCH RBC QN AUTO: 26.7 PG (ref 25–35)
MCHC RBC AUTO-ENTMCNC: 29.7 G/DL (ref 31–37)
MCV RBC AUTO: 89.8 FL (ref 80–100)
MONOCYTES # BLD AUTO: 0.5 K/UL (ref 0–1)
MONOCYTES NFR BLD AUTO: 6 %
NEUTROPHILS # BLD AUTO: 6.5 K/UL (ref 1.3–7.7)
NEUTROPHILS NFR BLD AUTO: 74 %
PH UR: 6.5 [PH] (ref 5–8)
PLATELET # BLD AUTO: 738 K/UL (ref 150–450)
POTASSIUM SERPL-SCNC: 4.2 MMOL/L (ref 3.5–5.1)
PROT SERPL-MCNC: 7 G/DL (ref 6.3–8.2)
PROT UR QL: (no result)
PT BLD: 10.6 SEC (ref 9–12)
RBC UR QL: 2 /HPF (ref 0–5)
SODIUM SERPL-SCNC: 136 MMOL/L (ref 137–145)
SP GR UR: 1.03 (ref 1–1.03)
SQUAMOUS UR QL AUTO: 1 /HPF (ref 0–4)
UROBILINOGEN UR QL STRIP: 3 MG/DL (ref ?–2)
WBC # BLD AUTO: 8.7 K/UL (ref 3.8–10.6)
WBC #/AREA URNS HPF: 3 /HPF (ref 0–5)

## 2022-09-17 RX ADMIN — INSULIN ASPART SCH: 100 INJECTION, SOLUTION INTRAVENOUS; SUBCUTANEOUS at 12:58

## 2022-09-17 RX ADMIN — INSULIN ASPART SCH: 100 INJECTION, SOLUTION INTRAVENOUS; SUBCUTANEOUS at 20:50

## 2022-09-17 RX ADMIN — LIDOCAINE SCH PATCH: 50 PATCH TOPICAL at 11:56

## 2022-09-17 RX ADMIN — INSULIN ASPART SCH: 100 INJECTION, SOLUTION INTRAVENOUS; SUBCUTANEOUS at 18:04

## 2022-09-17 RX ADMIN — MORPHINE SULFATE PRN MG: 4 INJECTION, SOLUTION INTRAMUSCULAR; INTRAVENOUS at 11:55

## 2022-09-17 RX ADMIN — MORPHINE SULFATE PRN MG: 4 INJECTION, SOLUTION INTRAMUSCULAR; INTRAVENOUS at 17:23

## 2022-09-17 RX ADMIN — DEXAMETHASONE SODIUM PHOSPHATE SCH MG: 4 INJECTION, SOLUTION INTRAMUSCULAR; INTRAVENOUS at 11:54

## 2022-09-17 RX ADMIN — DEXAMETHASONE SODIUM PHOSPHATE SCH MG: 4 INJECTION, SOLUTION INTRAMUSCULAR; INTRAVENOUS at 19:09

## 2022-09-17 RX ADMIN — MORPHINE SULFATE SCH MG: 15 TABLET, EXTENDED RELEASE ORAL at 09:27

## 2022-09-17 RX ADMIN — DEXAMETHASONE SODIUM PHOSPHATE SCH MG: 4 INJECTION, SOLUTION INTRAMUSCULAR; INTRAVENOUS at 23:50

## 2022-09-17 RX ADMIN — LORATADINE SCH MG: 10 TABLET ORAL at 09:28

## 2022-09-17 RX ADMIN — MORPHINE SULFATE SCH MG: 15 TABLET, EXTENDED RELEASE ORAL at 20:48

## 2022-09-17 NOTE — P.HPIM
History of Present Illness


H&P Date: 09/17/22


Patient is a 61-year-old male with  squamous cell carcinoma of the lung followed

by Dr. Wolf, known diabetes, diastolic congestive heart failure, COPD, and 

multiple other comorbid conditions who presented to the hospital at the 

direction of radiation oncology secondary to uncontrolled pain related to his 

malignancy.  The ER he underwent an extensive evaluation.  On arrival his vital 

signs were within normal limits.  Lab urinalysis showed hemoglobin 9.9, 

platelets 738, sodium 136.  Patient required multiple doses of narcotics and 

Valium in the ER.  Patient was recently here in the ER on 9/13 for 

hallucinations.  At that point in time he had a negative CT brain. He was admitt

ed for pain control. 





Patient seen and examined at bedside. Left elbow and shoulder pain for a year 

that has been getting worse. He had an extensive evaluation of the elbow and saw

an orthopedic surgeon. He had an MRI done of elbow in [place] and Dr. Maria 

and Dr. Eller's team decided they were concerned for cancer in the elbow. He 

completed his second dose of radiation to the elbow yesterday. He recently had 

hallucinations recently with pain medication changes when Dr. Arora 

increased Morphine and added Percocet. Currently taking Morphine MS 45 mg and 

Tylenol and Ibuprofen.   He is having intermittent numbness and tingling in the 

left hand. He has been unabel to sleep due to the pain and has to sit hunched 

over in the chair. 





He had been on prednisone for the pain but is due to start immuno therapy this 

coming week, and his steroids were stopped.. Has completed chemo and 7 weeks of 

radiation to the left chest.





Fell yesterday and one week ago due to pain and falling asleep. 





Pertinent positives and negatives as discussed in HPI, a complete review of 

systems was performed and all other systems are negative.








Vital signs reviewed


General: nontoxic, no distress, appears at stated age


Derm:  warm, dry


Head: atraumatic, normocephalic, symmetric


Eyes: EOMI, no lid lag, anicteric sclera, pupils equal round reactive to light


ENT: Nose and ears atraumatic, no thrush,  no pharyngeal erythema


Neck: No thyromegaly, no cervical lymphadenopathy, trachea midline, supple


Mouth: no lip lesion, mucus membranes moist


Cardiovascular: S1S2 reg, no murmur, positive posterior tibial pulse bilateral, 

no edema, capillary refill less than 2 seconds


Lungs: clear to auscultation bilateral, no rhonchi, no rales, no wheeze, no 

accessory muscle use


Abdominal: soft,  nontender to palpation, no guarding, no appreciable 

organomegaly, normal bowel sounds


Ext: no gross muscle atrophy,  muscle strength 5 out of 5 in all 4 extremities, 

no contractures


Neuro:  CN II-XII grossly intact, light touch intact all 4 extremities, finger 

to nose within normal limits,


Psych: Alert, oriented, appropriate affect





Assessment/Plan: 


Intractable pain secondary to malignancy 


Recent Hallucinations


Squamous Cell Ca of the Lung with Mets 


- Stick with only Morphine to asses what cause hallucinations, MS contin 45 mg 

BID, Morphine IV q4 prn, claritin, decadron


- consult oncology 


- add lidoderm patch 


- CM to arrange for home hospital bed





Anemia and thrombocytosis 


- follow CBC





DM 2 


- hold oral medications


- SSI 


- follow BS 





COPD without exacerbation


- prn broncho dilators 





Diastolic CHF without exacerbation 


- await home meds to be verified 











The patient is admitted with an anticipated greater than 2 midnight stay for 

evaluation of intractable pain


Surrogate decision-maker: wife


CODE STATUS:full


DVT prophylaxis: lovenox


Discussed with: patient, nursing


Anticipated discharge date: in 2-3 days


Anticipated discharge place: home


A total of 65 minutes was spent on the care of this complex patient more than 

50% of the time was spent in counseling and care coordination.








Past Medical History


Past Medical History: Asthma, Heart Failure, COPD, Diabetes Mellitus, 

Hypertension


Additional Past Medical History / Comment(s): Uses home O2 as needed only. 

Squamous cell carcinoma of the lung, rib lesion and lymph adenopathy, shoulder 

pain related to lung mass. Pancous tumor left apex


History of Any Multi-Drug Resistant Organisms: MRSA


Date of last positivie culture/infection: 6/23/16


MDRO Source:: Left Foot


Past Surgical History: Hernia Repair, Orthopedic Surgery, Tonsillectomy


Additional Past Surgical History / Comment(s): lt. 5th toe amp


Past Anesthesia/Blood Transfusion Reactions: No Reported Reaction


Past Psychological History: No Psychological Hx Reported


Smoking Status: Former smoker


Past Alcohol Use History: None Reported


Past Drug Use History: None Reported





- Past Family History


  ** Sister(s)


Family Medical History: Cancer





  ** Father


Family Medical History: Congestive Heart Failure (CHF), Diabetes Mellitus





  ** Mother


Family Medical History: Renal Disease





Medications and Allergies


                                Home Medications











 Medication  Instructions  Recorded  Confirmed  Type


 


Acetaminophen Tab [Tylenol] 1,000 mg PO Q6HR PRN 09/03/21 05/18/22 History


 


Albuterol Sulfate [Ventolin HFA] 1 - 2 puff INHALATION RT-Q4H PRN 09/03/21 05/18/22 History


 


Oxymetazoline HCl [Afrin 0.05%] 1 spray EA NOSTRIL DAILY PRN 09/03/21 05/18/22 

History


 


Albuterol Inhaler [Ventolin Hfa 1 puff INHALATION RT-TID #8 gm 09/09/21 05/18/22

 Rx





Inhaler]    


 


Diclofenac Sodium Gel [Voltaren 4 gm TOPICAL QID PRN 04/11/22 05/18/22 History





Gel]    


 


Furosemide [Lasix] 40 mg PO DAILY PRN 04/11/22 05/18/22 History


 


Pantoprazole [Protonix] 40 mg PO DAILY PRN 04/11/22 05/18/22 History


 


Pioglitazone [Actos] 15 mg PO DAILY 04/11/22 05/18/22 History


 


metFORMIN HCL [Glucophage] 1,000 mg PO BID 04/11/22 05/18/22 History


 


metOLazone [Zaroxolyn] 5 mg PO DAILY 04/11/22 05/18/22 History


 


HYDROcodone/APAP 10-325MG [Norco 1 tab PO Q6HR PRN 04/18/22 05/18/22 History





]    


 


Morphine Sulfate [Ms Contin] 45 mg PO Q12HR 04/18/22 05/18/22 History


 


Meloxicam 7.5 mg PO DAILY 04/19/22 05/18/22 History








                                    Allergies











Allergy/AdvReac Type Severity Reaction Status Date / Time


 


No Known Allergies Allergy   Verified 09/12/22 21:23














Physical Exam


Osteopathic Statement: *.  No significant issues noted on an osteopathic 

structural exam other than those noted in the History and Physical/Consult.


Vitals: 


                                   Vital Signs











  Temp Pulse Resp BP Pulse Ox


 


 09/16/22 21:06  98.2 F  90  16  146/76  94 L








                                Intake and Output











 09/16/22 09/17/22 09/17/22





 22:59 06:59 14:59


 


Other:   


 


  Weight 115.666 kg  














Results


CBC & Chem 7: 


                                 09/17/22 04:05





                                 09/17/22 04:05


Labs: 


                  Abnormal Lab Results - Last 24 Hours (Table)











  09/17/22 09/17/22 09/17/22 Range/Units





  04:05 04:05 04:05 


 


RBC  3.72 L    (4.30-5.90)  m/uL


 


Hgb  9.9 L    (13.0-17.5)  gm/dL


 


Hct  33.4 L    (39.0-53.0)  %


 


MCHC  29.7 L    (31.0-37.0)  g/dL


 


RDW  16.1 H    (11.5-15.5)  %


 


Plt Count  738 H    (150-450)  k/uL


 


APTT   31.9 H   (22.0-30.0)  sec


 


Sodium    136 L  (137-145)  mmol/L


 


Chloride    95 L  ()  mmol/L


 


Creatinine    0.52 L  (0.66-1.25)  mg/dL


 


Glucose    103 H  (74-99)  mg/dL


 


Albumin    3.3 L  (3.5-5.0)  g/dL

## 2022-09-17 NOTE — ED
Recheck HPI





- General


Chief Complaint: Extremity Problem,Nontraumatic


Stated Complaint: pain managment


Time Seen by Provider: 09/17/22 03:33


Source: patient, family, RN notes reviewed, old records reviewed, Caregiver


Mode of arrival: wheelchair


Limitations: no limitations





- History of Present Illness


Initial Comments: 





This is a 61-year-old male he is older facility for evaluation of cancer 

treatment.  Patient currently going through radiation therapy, worsening pain 

worsening chronic pain acute on chronic pain from cancer.  Patient also having 

left elbow pain decreased range of motion due to swelling.  Patient has no 

traumatic injuries denies fevers no other complaints


MD Complaint: medication refill request


-: days(s)


Returns Today for: persistent/worsening pain related to initial visit


Context: planned re-check, ran out of medication


Associated Symptoms: malaise, nausea


Treatments Prior to Arrival: Given Pain Meds on





- Related Data


                                Home Medications











 Medication  Instructions  Recorded  Confirmed


 


Acetaminophen Tab [Tylenol] 1,000 mg PO Q6HR PRN 09/03/21 05/18/22


 


Albuterol Sulfate [Ventolin HFA] 1 - 2 puff INHALATION RT-Q4H PRN 09/03/21 05/18/22


 


Oxymetazoline HCl [Afrin 0.05%] 1 spray EA NOSTRIL DAILY PRN 09/03/21 05/18/22


 


Diclofenac Sodium Gel [Voltaren 4 gm TOPICAL QID PRN 04/11/22 05/18/22





Gel]   


 


Furosemide [Lasix] 40 mg PO DAILY PRN 04/11/22 05/18/22


 


Pantoprazole [Protonix] 40 mg PO DAILY PRN 04/11/22 05/18/22


 


Pioglitazone [Actos] 15 mg PO DAILY 04/11/22 05/18/22


 


metFORMIN HCL [Glucophage] 1,000 mg PO BID 04/11/22 05/18/22


 


metOLazone [Zaroxolyn] 5 mg PO DAILY 04/11/22 05/18/22


 


HYDROcodone/APAP 10-325MG [Norco 1 tab PO Q6HR PRN 04/18/22 05/18/22





]   


 


Morphine Sulfate [Ms Contin] 45 mg PO Q12HR 04/18/22 05/18/22


 


Meloxicam 7.5 mg PO DAILY 04/19/22 05/18/22








                                  Previous Rx's











 Medication  Instructions  Recorded


 


Albuterol Inhaler [Ventolin Hfa 1 puff INHALATION RT-TID #8 gm 09/09/21





Inhaler]  











                                    Allergies











Allergy/AdvReac Type Severity Reaction Status Date / Time


 


No Known Allergies Allergy   Verified 09/12/22 21:23














Review of Systems


ROS Statement: 


Those systems with pertinent positive or pertinent negative responses have been 

documented in the HPI.





ROS Other: All systems not noted in ROS Statement are negative.





Past Medical History


Past Medical History: Asthma, Heart Failure, COPD, Diabetes Mellitus, 

Hypertension


Additional Past Medical History / Comment(s): Uses home O2 as needed only. Lung 

mass, rib lesion and lymph adenopathy, shoulder pain related to lung mass. 

Pancous tumor left apex


History of Any Multi-Drug Resistant Organisms: MRSA


Date of last positivie culture/infection: 6/23/16


MDRO Source:: Left Foot


Past Surgical History: Hernia Repair, Orthopedic Surgery, Tonsillectomy


Additional Past Surgical History / Comment(s): lt. 5th toe amp


Past Anesthesia/Blood Transfusion Reactions: No Reported Reaction


Past Psychological History: No Psychological Hx Reported


Smoking Status: Former smoker


Past Alcohol Use History: None Reported


Past Drug Use History: None Reported





- Past Family History


  ** Sister(s)


Family Medical History: Cancer





  ** Father


Family Medical History: Congestive Heart Failure (CHF), Diabetes Mellitus





  ** Mother


Family Medical History: Renal Disease





General Exam


Limitations: no limitations


General appearance: alert, in no apparent distress


Head exam: Present: atraumatic, normocephalic, normal inspection


Eye exam: Present: normal appearance, PERRL, EOMI.  Absent: scleral icterus, 

conjunctival injection, periorbital swelling


ENT exam: Present: normal exam, mucous membranes moist


Neck exam: Present: normal inspection.  Absent: tenderness, meningismus, 

lymphadenopathy


Respiratory exam: Present: normal lung sounds bilaterally.  Absent: respiratory 

distress, wheezes, rales, rhonchi, stridor


Cardiovascular Exam: Present: regular rate, normal rhythm, normal heart sounds. 

Absent: systolic murmur, diastolic murmur, rubs, gallop, clicks


GI/Abdominal exam: Present: soft, normal bowel sounds.  Absent: distended, 

tenderness, guarding, rebound, rigid


Extremities exam: Present: normal inspection, full ROM, normal capillary refill.

 Absent: tenderness, pedal edema, joint swelling, calf tenderness


Back exam: Present: normal inspection


Neurological exam: Present: alert, oriented X3, CN II-XII intact


Psychiatric exam: Present: normal affect, normal mood


Skin exam: Present: warm, dry, intact, normal color.  Absent: rash





Course


                                   Vital Signs











  09/16/22





  21:06


 


Temperature 98.2 F


 


Pulse Rate 90


 


Respiratory 16





Rate 


 


Blood Pressure 146/76


 


O2 Sat by Pulse 94 L





Oximetry 














- Reevaluation(s)


Reevaluation #1: 





09/17/22 06:36


Medical records reviewed


Reevaluation #2: 





09/17/22 06:36


pain is controlled


Reevaluation #3: 





09/17/22 06:36


patient informed results and questions answered





- Consultations


Consultation #1: 





Spoke with [facility] who agrees to admit this patient





Medical Decision Making





- Medical Decision Making





61 male with acute on chronic pain.  Patient be admitted for pain control





- Lab Data


Result diagrams: 


                                 09/17/22 04:05





                                 09/17/22 04:05


                                   Lab Results











  09/17/22 09/17/22 09/17/22 Range/Units





  04:05 04:05 04:05 


 


WBC  8.7    (3.8-10.6)  k/uL


 


RBC  3.72 L    (4.30-5.90)  m/uL


 


Hgb  9.9 L    (13.0-17.5)  gm/dL


 


Hct  33.4 L    (39.0-53.0)  %


 


MCV  89.8    (80.0-100.0)  fL


 


MCH  26.7    (25.0-35.0)  pg


 


MCHC  29.7 L    (31.0-37.0)  g/dL


 


RDW  16.1 H    (11.5-15.5)  %


 


Plt Count  738 H    (150-450)  k/uL


 


MPV  7.1    


 


Neutrophils %  74    %


 


Lymphocytes %  15    %


 


Monocytes %  6    %


 


Eosinophils %  3    %


 


Basophils %  0    %


 


Neutrophils #  6.5    (1.3-7.7)  k/uL


 


Lymphocytes #  1.3    (1.0-4.8)  k/uL


 


Monocytes #  0.5    (0-1.0)  k/uL


 


Eosinophils #  0.2    (0-0.7)  k/uL


 


Basophils #  0.0    (0-0.2)  k/uL


 


Hypochromasia  Marked    


 


Anisocytosis  Slight    


 


PT   10.6   (9.0-12.0)  sec


 


INR   1.0   (<1.2)  


 


APTT   31.9 H   (22.0-30.0)  sec


 


Sodium    136 L  (137-145)  mmol/L


 


Potassium    4.2  (3.5-5.1)  mmol/L


 


Chloride    95 L  ()  mmol/L


 


Carbon Dioxide    30  (22-30)  mmol/L


 


Anion Gap    11  mmol/L


 


BUN    11  (9-20)  mg/dL


 


Creatinine    0.52 L  (0.66-1.25)  mg/dL


 


Est GFR (CKD-EPI)AfAm    >90  (>60 ml/min/1.73 sqM)  


 


Est GFR (CKD-EPI)NonAf    >90  (>60 ml/min/1.73 sqM)  


 


Glucose    103 H  (74-99)  mg/dL


 


Calcium    9.0  (8.4-10.2)  mg/dL


 


Phosphorus    3.8  (2.5-4.5)  mg/dL


 


Magnesium    2.0  (1.6-2.3)  mg/dL


 


Total Bilirubin    0.3  (0.2-1.3)  mg/dL


 


AST    23  (17-59)  U/L


 


ALT    13  (4-49)  U/L


 


Alkaline Phosphatase    110  ()  U/L


 


Troponin I     (0.000-0.034)  ng/mL


 


NT-Pro-B Natriuret Pep     pg/mL


 


Total Protein    7.0  (6.3-8.2)  g/dL


 


Albumin    3.3 L  (3.5-5.0)  g/dL














  09/17/22 09/17/22 Range/Units





  04:05 04:05 


 


WBC    (3.8-10.6)  k/uL


 


RBC    (4.30-5.90)  m/uL


 


Hgb    (13.0-17.5)  gm/dL


 


Hct    (39.0-53.0)  %


 


MCV    (80.0-100.0)  fL


 


MCH    (25.0-35.0)  pg


 


MCHC    (31.0-37.0)  g/dL


 


RDW    (11.5-15.5)  %


 


Plt Count    (150-450)  k/uL


 


MPV    


 


Neutrophils %    %


 


Lymphocytes %    %


 


Monocytes %    %


 


Eosinophils %    %


 


Basophils %    %


 


Neutrophils #    (1.3-7.7)  k/uL


 


Lymphocytes #    (1.0-4.8)  k/uL


 


Monocytes #    (0-1.0)  k/uL


 


Eosinophils #    (0-0.7)  k/uL


 


Basophils #    (0-0.2)  k/uL


 


Hypochromasia    


 


Anisocytosis    


 


PT    (9.0-12.0)  sec


 


INR    (<1.2)  


 


APTT    (22.0-30.0)  sec


 


Sodium    (137-145)  mmol/L


 


Potassium    (3.5-5.1)  mmol/L


 


Chloride    ()  mmol/L


 


Carbon Dioxide    (22-30)  mmol/L


 


Anion Gap    mmol/L


 


BUN    (9-20)  mg/dL


 


Creatinine    (0.66-1.25)  mg/dL


 


Est GFR (CKD-EPI)AfAm    (>60 ml/min/1.73 sqM)  


 


Est GFR (CKD-EPI)NonAf    (>60 ml/min/1.73 sqM)  


 


Glucose    (74-99)  mg/dL


 


Calcium    (8.4-10.2)  mg/dL


 


Phosphorus    (2.5-4.5)  mg/dL


 


Magnesium    (1.6-2.3)  mg/dL


 


Total Bilirubin    (0.2-1.3)  mg/dL


 


AST    (17-59)  U/L


 


ALT    (4-49)  U/L


 


Alkaline Phosphatase    ()  U/L


 


Troponin I  <0.012   (0.000-0.034)  ng/mL


 


NT-Pro-B Natriuret Pep   711  pg/mL


 


Total Protein    (6.3-8.2)  g/dL


 


Albumin    (3.5-5.0)  g/dL














- EKG Data


-: EKG Interpreted by Me (EKG shows normal sinus rhythm 87 QRS 86 QTc 389)





Disposition


Clinical Impression: 


 Chronic pain, Cancer associated pain





Disposition: ADMITTED AS IP TO THIS HOSP


Condition: Fair


Is patient prescribed a controlled substance at d/c from ED?: No


Time of Disposition: 05:30

## 2022-09-18 VITALS
HEART RATE: 98 BPM | DIASTOLIC BLOOD PRESSURE: 90 MMHG | RESPIRATION RATE: 16 BRPM | SYSTOLIC BLOOD PRESSURE: 154 MMHG | TEMPERATURE: 97.7 F

## 2022-09-18 LAB
ALBUMIN SERPL-MCNC: 3.3 G/DL (ref 3.8–4.9)
ALBUMIN/GLOB SERPL: 0.82 G/DL (ref 1.6–3.17)
ALP SERPL-CCNC: 89 U/L (ref 41–126)
ALT SERPL-CCNC: 14 U/L (ref 10–49)
ANION GAP SERPL CALC-SCNC: 12.1 MMOL/L (ref 10–18)
AST SERPL-CCNC: 20 U/L (ref 14–35)
BUN SERPL-SCNC: 8.9 MG/DL (ref 9–27)
BUN/CREAT SERPL: 15.89 RATIO (ref 12–20)
CALCIUM SPEC-MCNC: 9.2 MG/DL (ref 8.7–10.3)
CHLORIDE SERPL-SCNC: 97 MMOL/L (ref 96–109)
CO2 SERPL-SCNC: 27.2 MMOL/L (ref 20–27.5)
ERYTHROCYTE [DISTWIDTH] IN BLOOD BY AUTOMATED COUNT: 3.71 X 10*6/UL (ref 4.4–5.6)
ERYTHROCYTE [DISTWIDTH] IN BLOOD: 15.5 % (ref 11.5–14.5)
GLOBULIN SER CALC-MCNC: 4 G/DL (ref 1.6–3.3)
GLUCOSE BLD-MCNC: 163 MG/DL (ref 70–110)
GLUCOSE SERPL-MCNC: 162 MG/DL (ref 70–110)
HCT VFR BLD AUTO: 32.7 % (ref 39.6–50)
HGB BLD-MCNC: 9.9 G/DL (ref 13–17)
MCH RBC QN AUTO: 26.7 PG (ref 27–32)
MCHC RBC AUTO-ENTMCNC: 30.3 G/DL (ref 32–37)
MCV RBC AUTO: 88.1 FL (ref 80–97)
NRBC BLD AUTO-RTO: 0 /100 WBCS (ref 0–0)
PLATELET # BLD AUTO: 643 X 10*3/UL (ref 140–440)
POTASSIUM SERPL-SCNC: 5.1 MMOL/L (ref 3.5–5.5)
PROT SERPL-MCNC: 7.3 G/DL (ref 6.2–8.2)
SODIUM SERPL-SCNC: 137 MMOL/L (ref 135–145)
WBC # BLD AUTO: 9.48 X 10*3/UL (ref 4.5–10)

## 2022-09-18 RX ADMIN — MORPHINE SULFATE SCH MG: 15 TABLET, EXTENDED RELEASE ORAL at 08:49

## 2022-09-18 RX ADMIN — LORATADINE SCH MG: 10 TABLET ORAL at 08:50

## 2022-09-18 RX ADMIN — INSULIN ASPART SCH UNIT: 100 INJECTION, SOLUTION INTRAVENOUS; SUBCUTANEOUS at 08:48

## 2022-09-18 RX ADMIN — LIDOCAINE SCH PATCH: 50 PATCH TOPICAL at 08:48

## 2022-09-18 RX ADMIN — DEXAMETHASONE SODIUM PHOSPHATE SCH MG: 4 INJECTION, SOLUTION INTRAMUSCULAR; INTRAVENOUS at 05:52

## 2022-09-18 NOTE — P.CONS
History of Present Illness





- Reason for Consult


Consult date: 09/17/22


[facility]


Requesting physician: Lul Lizarraga





- History of Present Illness





ollow Up Visit


Lung Cancer 





HPI :





This is a very nice patient who presented with worsening left shoulder pain 

since September/2021,he had aCXR then a CT scan of chest on 03/23/2021 which 

revealed 6.5x7.7x9cm mass in DEVEN of his lung,destruction of first third and 

second ribs,extending into supraclavicual area and axilla,inseparable from left 

subclavian vein.


On 04/01/2022,PET scan revealed revealed suspicious uptake in the above 

mentioned mass,no other metastatic disease.





On 04/15/2022,brain MRI was negative for metastatic disease..





On 04/27/2022,CT guided lung biopsy was positive for invasive squamous cell c

arcinoma.





On 05/04/2022,he started weekly carboplatin/taxol with XRT.





On 08/01/2022,repeat CT scan of chest/abdomen/pelvis  revealed new bilateral new

lung nodules up to 1.9 cm





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 

mutation,high TMB.





He feels tired,has chronic legs sores from diabetes,his shoulder pain is much 

better but he has left elbow pain,seen by ortho,had an MRI of elbow,was told it 

was not cancer,however,he was referred to ortho/onc,his neuropathy in feet is 

stable from his baseline,his legs edema is slightly better,his activities 

limited to wheel chair (baseline prior to Dx with lungcancer),his PS is stable


I had a discussion with the patient and his wife.


Reviewed molecular studies results with them


At last visit Dr. Eller  discussed second line therapy with opdivo,potential 

benefits/side effects.





This was to start on 9/22i





he has now been admitted for pain management and was recently seen in ER for 

AMS, neg CT without contrast. 








Review of Systems


All systems: negative


Constitutional: Reports as per HPI





Past Medical History


Past Medical History: Asthma, Heart Failure, COPD, Diabetes Mellitus, 

Hypertension


Additional Past Medical History / Comment(s): Uses home O2 as needed only. 

Squamous cell carcinoma of the lung, rib lesion and lymph adenopathy, shoulder 

pain related to lung mass. Pancous tumor left apex


History of Any Multi-Drug Resistant Organisms: MRSA


Year Discovered:: 6/23/16


MDRO Source:: Left Foot


Past Surgical History: Hernia Repair, Orthopedic Surgery, Tonsillectomy


Additional Past Surgical History / Comment(s): lt. 5th toe amp


Past Anesthesia/Blood Transfusion Reactions: No Reported Reaction


Past Psychological History: No Psychological Hx Reported


Smoking Status: Former smoker


Past Alcohol Use History: None Reported


Past Drug Use History: None Reported





- Past Family History


  ** Sister(s)


Family Medical History: Cancer





  ** Father


Family Medical History: Congestive Heart Failure (CHF), Diabetes Mellitus





  ** Mother


Family Medical History: Renal Disease





Medications and Allergies


                                Home Medications











 Medication  Instructions  Recorded  Confirmed  Type


 


Acetaminophen Tab [Tylenol] 1,000 mg PO Q6HR PRN 09/03/21 09/17/22 History


 


Albuterol Sulfate [Ventolin HFA] 1 - 2 puff INHALATION RT-Q4H PRN 09/03/21 09/17/22 History


 


Morphine Sulfate [Ms Contin] 30 mg PO Q12H 04/18/22 09/17/22 History


 


Albuterol Nebulized [Ventolin 2.5 mg INHALATION RT-QID PRN 09/17/22 09/17/22 

History





Nebulized]    


 


Ibuprofen [Motrin Ib] 300 mg PO Q6H PRN 09/17/22 09/17/22 History


 


Morphine Sulfate ER [Ms Contin] 15 mg PO Q12H 09/17/22 09/17/22 History


 


diazePAM [Valium] 5 mg PO DAILY PRN 09/17/22 09/17/22 History


 


ondansetron HCL [Zofran] 8 mg PO TID PRN 09/17/22 09/17/22 History


 


Lidocaine 5% Patch [Lidoderm 5% 1 patch TOPICAL DAILY #30 patch 09/18/22  Rx





Patch]    


 


Loratadine [Claritin] 10 mg PO DAILY #30 tab 09/18/22  Rx


 


predniSONE 10 mg PO DAILY #15 tab 09/18/22  Rx








                                    Allergies











Allergy/AdvReac Type Severity Reaction Status Date / Time


 


No Known Allergies Allergy   Verified 09/17/22 12:36














Physical Exam


Vitals: 


                                   Vital Signs











  Temp Pulse Pulse Resp BP BP Pulse Ox


 


 09/17/22 12:52  99.3 F   95  18   149/82  92 L


 


 09/17/22 08:23  98.2 F   89  18   132/72  94 L


 


 09/16/22 21:06  98.2 F  90   16  146/76   94 L








                                Intake and Output











 09/17/22 09/17/22 09/17/22





 06:59 14:59 22:59


 


Other:   


 


  Voiding Method  Urinal 


 


  Weight  115.666 kg 














- Constitutional


General appearance: average body habitus, no acute distress





- EENT


Eyes: EOMI


ENT: NA/AT





- Neck


Neck: normal ROM





- Respiratory


Respiratory: bilateral: CTA





- Cardiovascular


Rhythm: regularly irregular





- Gastrointestinal


General gastrointestinal: soft





- Integumentary


Integumentary: pale





- Musculoskeletal


Musculoskeletal: generalized weakness





- Psychiatric


Psychiatric: A&O x's 3





Results


CBC & Chem 7: 


                                 09/18/22 05:22





                                 09/18/22 05:22


Labs: 


                  Abnormal Lab Results - Last 24 Hours (Table)











  09/17/22 09/17/22 09/17/22 Range/Units





  04:05 04:05 04:05 


 


RBC  3.72 L    (4.30-5.90)  m/uL


 


Hgb  9.9 L    (13.0-17.5)  gm/dL


 


Hct  33.4 L    (39.0-53.0)  %


 


MCHC  29.7 L    (31.0-37.0)  g/dL


 


RDW  16.1 H    (11.5-15.5)  %


 


Plt Count  738 H    (150-450)  k/uL


 


APTT   31.9 H   (22.0-30.0)  sec


 


Sodium    136 L  (137-145)  mmol/L


 


Chloride    95 L  ()  mmol/L


 


Creatinine    0.52 L  (0.66-1.25)  mg/dL


 


Glucose    103 H  (74-99)  mg/dL


 


Albumin    3.3 L  (3.5-5.0)  g/dL


 


Urine Protein     (Negative)  


 


Urine Mucus     (None)  /hpf














  09/17/22 Range/Units





  11:34 


 


RBC   (4.30-5.90)  m/uL


 


Hgb   (13.0-17.5)  gm/dL


 


Hct   (39.0-53.0)  %


 


MCHC   (31.0-37.0)  g/dL


 


RDW   (11.5-15.5)  %


 


Plt Count   (150-450)  k/uL


 


APTT   (22.0-30.0)  sec


 


Sodium   (137-145)  mmol/L


 


Chloride   ()  mmol/L


 


Creatinine   (0.66-1.25)  mg/dL


 


Glucose   (74-99)  mg/dL


 


Albumin   (3.5-5.0)  g/dL


 


Urine Protein  1+ H  (Negative)  


 


Urine Mucus  Rare H  (None)  /hpf














Assessment and Plan


(1) Lung cancer


Narrative/Plan: 


Starting Immune therapy this thursday





Status: Acute   Code(s): C34.90 - MALIGNANT NEOPLASM OF UNSP PART OF UNSP BRON

CHUS OR LUNG   SNOMED Code(s): 355622610


   





(2) Cancer associated pain


Narrative/Plan: 


Status post palliaitive radiation to left elbow, increased left arm swelling 

discussed compression. ? doppler


Status: Acute   Code(s): G89.3 - NEOPLASM RELATED PAIN (ACUTE) (CHRONIC)   

SNOMED Code(s): 75323651513733

## 2022-09-18 NOTE — P.DS
Providers


Date of admission: 


09/17/22 05:31





Expected date of discharge: 09/18/22


Attending physician: 


Toby Corbett MD





Consults: 





                                        





09/17/22 05:31


Consult Physician Routine 


   Consulting Provider: Teresa Ellis


   Consult Reason/Comments: known


   Do you want consulting provider notified?: Yes











Primary care physician: 


Jesica Arora





Hospital Course: 


Discharge Diagnosis:


Intractable pain secondary to malignancy 


Recent Hallucinations


Squamous Cell Ca of the Lung with Mets 


Anemia and thrombocytosis 


DM 2 


COPD without exacerbation


Diastolic CHF without exacerbation 








Hospital Course: 


Patient is a 61-year-old male with  squamous cell carcinoma of the lung followed

by Dr. Wolf, known diabetes, diastolic congestive heart failure, COPD, and 

multiple other comorbid conditions who presented to the hospital at the 

direction of radiation oncology secondary to uncontrolled pain related to his 

malignancy.  The ER he underwent an extensive evaluation.  On arrival his vital 

signs were within normal limits.  Lab urinalysis showed hemoglobin 9.9, 

platelets 738, sodium 136.  Patient required multiple doses of narcotics and 

Valium in the ER.  Patient was recently here in the ER on 9/13 for 

hallucinations.  At that point in time he had a negative CT brain. He was 

admitted for pain control.  He was resumed on steroids, a Lidoderm patch and 

Claritin was added.  He did well.  He was not requiring IV narcotics for greater

than 12 hours.  He was determined stable for discharge home.  He will continue 

with his MS Contin 45 mg twice daily, they are we have Norco at home which I 

said he can resume his longest hallucination stay abated.  He will go home on 

prednisone 10 mg daily, Claritin, and a Lidoderm patch.  Instructions explained 

to wife.  They are happy with his improvement in pain.





Patient does require a hospital bed secondary to a malignancy related pain.  He 

has severe back pain and is unable to lay flat.  He also has severe elbow pain 

due to metastatic disease and is unable to lay flat.  In order for a hospital 

bed has been obtained and sent through to insurance for authorization.








Patient seen and examined at bedside.  Pain is much better.  Moving better.  He 

was able to sleep yesterday.





Vital signs reviewed and stable. 


General: nontoxic, no distress, appears at stated age


Derm: warm, dry, minimal swelling in left fingers.


Head: atraumatic, normocephalic, symmetric


Eyes: EOMI, no lid lag, anicteric sclera


Mouth: no lip lesion, mucus membranes moist


Cardiovascular: S1S2 reg, no murmur, positive posterior tibial pulse bilateral, 


Lungs: CTA bilateral, no rhonchi, no rales , no accessory muscle use


Abdominal: soft,  nontender to palpation, no guarding, no appreciable 

organomegaly


Ext: no gross muscle atrophy, no edema, no contractures


Neuro:  CN II-XI grossly intact, no focal neuro deficits


Psych: Alert, oriented, appropriate affect 








A total of 37 minutes of time were spent preparing this complex discharge 

summary.


Patient was discharged on 9/18/22. 





Patient Condition at Discharge: Fair





Plan - Discharge Summary


New Discharge Prescriptions: 


New


   Loratadine [Claritin] 10 mg PO DAILY #30 tab


   Lidocaine 5% Patch [Lidoderm 5% Patch] 1 patch TOPICAL DAILY #30 patch


   predniSONE 10 mg PO DAILY #15 tab





Continue


   Albuterol Sulfate [Ventolin HFA] 1 - 2 puff INHALATION RT-Q4H PRN


     PRN Reason: Shortness Of Breath


   Acetaminophen Tab [Tylenol] 1,000 mg PO Q6HR PRN


     PRN Reason: Pain


   ondansetron HCL [Zofran] 8 mg PO TID PRN


     PRN Reason: Nausea And Vomiting


   Morphine Sulfate [Ms Contin] 30 mg PO Q12H


   diazePAM [Valium] 5 mg PO DAILY PRN


     PRN Reason: prior to radiation


   Morphine Sulfate ER [Ms Contin] 15 mg PO Q12H


   Ibuprofen [Motrin Ib] 300 mg PO Q6H PRN


     PRN Reason: Pain


   Albuterol Nebulized [Ventolin Nebulized] 2.5 mg INHALATION RT-QID PRN


     PRN Reason: Shortness Of Breath


Discharge Medication List





Acetaminophen Tab [Tylenol] 1,000 mg PO Q6HR PRN 09/03/21 [History]


Albuterol Sulfate [Ventolin HFA] 1 - 2 puff INHALATION RT-Q4H PRN 09/03/21 

[History]


Morphine Sulfate [Ms Contin] 30 mg PO Q12H 04/18/22 [History]


Albuterol Nebulized [Ventolin Nebulized] 2.5 mg INHALATION RT-QID PRN 09/17/22 

[History]


Ibuprofen [Motrin Ib] 300 mg PO Q6H PRN 09/17/22 [History]


Morphine Sulfate ER [Ms Contin] 15 mg PO Q12H 09/17/22 [History]


diazePAM [Valium] 5 mg PO DAILY PRN 09/17/22 [History]


ondansetron HCL [Zofran] 8 mg PO TID PRN 09/17/22 [History]


Lidocaine 5% Patch [Lidoderm 5% Patch] 1 patch TOPICAL DAILY #30 patch 09/18/22 

[Rx]


Loratadine [Claritin] 10 mg PO DAILY #30 tab 09/18/22 [Rx]


predniSONE 10 mg PO DAILY #15 tab 09/18/22 [Rx]








Follow up Appointment(s)/Referral(s): 


Jesica Arora MD [Primary Care Provider] - 1-2 days


Oj Maria MD [STAFF PHYSICIAN] - As Needed (as scheduled prior)


Nasreen Eller MD [STAFF PHYSICIAN] - 1 Week


Activity/Diet/Wound Care/Special Instructions: 


Activity:


as tolerated





Diet: 


regular








Special Instructions: 


Can resume norco at home, continue with prednisone. 


Discharge Disposition: HOME SELF-CARE

## 2022-10-12 ENCOUNTER — HOSPITAL ENCOUNTER (INPATIENT)
Dept: HOSPITAL 47 - EC | Age: 62
LOS: 5 days | Discharge: HOME HEALTH | DRG: 603 | End: 2022-10-17
Payer: MEDICARE

## 2022-10-12 DIAGNOSIS — I11.0: ICD-10-CM

## 2022-10-12 DIAGNOSIS — D84.9: ICD-10-CM

## 2022-10-12 DIAGNOSIS — I50.9: ICD-10-CM

## 2022-10-12 DIAGNOSIS — E87.1: ICD-10-CM

## 2022-10-12 DIAGNOSIS — E11.9: ICD-10-CM

## 2022-10-12 DIAGNOSIS — Z79.891: ICD-10-CM

## 2022-10-12 DIAGNOSIS — J45.901: ICD-10-CM

## 2022-10-12 DIAGNOSIS — Z80.9: ICD-10-CM

## 2022-10-12 DIAGNOSIS — C78.01: ICD-10-CM

## 2022-10-12 DIAGNOSIS — Z82.49: ICD-10-CM

## 2022-10-12 DIAGNOSIS — Z79.51: ICD-10-CM

## 2022-10-12 DIAGNOSIS — E66.9: ICD-10-CM

## 2022-10-12 DIAGNOSIS — Z92.3: ICD-10-CM

## 2022-10-12 DIAGNOSIS — G89.3: ICD-10-CM

## 2022-10-12 DIAGNOSIS — L03.313: Primary | ICD-10-CM

## 2022-10-12 DIAGNOSIS — E86.1: ICD-10-CM

## 2022-10-12 DIAGNOSIS — C78.02: ICD-10-CM

## 2022-10-12 DIAGNOSIS — F17.210: ICD-10-CM

## 2022-10-12 DIAGNOSIS — Z86.14: ICD-10-CM

## 2022-10-12 DIAGNOSIS — Z28.21: ICD-10-CM

## 2022-10-12 DIAGNOSIS — Z51.5: ICD-10-CM

## 2022-10-12 DIAGNOSIS — Z79.899: ICD-10-CM

## 2022-10-12 DIAGNOSIS — C34.91: ICD-10-CM

## 2022-10-12 DIAGNOSIS — Z28.310: ICD-10-CM

## 2022-10-12 DIAGNOSIS — Z92.21: ICD-10-CM

## 2022-10-12 DIAGNOSIS — C79.51: ICD-10-CM

## 2022-10-12 DIAGNOSIS — J44.1: ICD-10-CM

## 2022-10-12 DIAGNOSIS — Z83.3: ICD-10-CM

## 2022-10-12 DIAGNOSIS — D63.8: ICD-10-CM

## 2022-10-12 LAB
ALBUMIN SERPL-MCNC: 2.7 G/DL (ref 3.5–5)
ALP SERPL-CCNC: 88 U/L (ref 38–126)
ALT SERPL-CCNC: 13 U/L (ref 4–49)
ANION GAP SERPL CALC-SCNC: 8 MMOL/L
APTT BLD: 33.5 SEC (ref 22–30)
AST SERPL-CCNC: 23 U/L (ref 17–59)
BASOPHILS # BLD AUTO: 0 K/UL (ref 0–0.2)
BASOPHILS NFR BLD AUTO: 0 %
BUN SERPL-SCNC: 19 MG/DL (ref 9–20)
CALCIUM SPEC-MCNC: 8.2 MG/DL (ref 8.4–10.2)
CHLORIDE SERPL-SCNC: 97 MMOL/L (ref 98–107)
CO2 SERPL-SCNC: 31 MMOL/L (ref 22–30)
EOSINOPHIL # BLD AUTO: 0.1 K/UL (ref 0–0.7)
EOSINOPHIL NFR BLD AUTO: 2 %
ERYTHROCYTE [DISTWIDTH] IN BLOOD BY AUTOMATED COUNT: 3.43 M/UL (ref 4.3–5.9)
ERYTHROCYTE [DISTWIDTH] IN BLOOD: 15.8 % (ref 11.5–15.5)
GLUCOSE BLD-MCNC: 96 MG/DL (ref 70–110)
GLUCOSE SERPL-MCNC: 104 MG/DL (ref 74–99)
HCT VFR BLD AUTO: 30.4 % (ref 39–53)
HGB BLD-MCNC: 9.1 GM/DL (ref 13–17.5)
INR PPP: 1.1 (ref ?–1.2)
LYMPHOCYTES # SPEC AUTO: 0.7 K/UL (ref 1–4.8)
LYMPHOCYTES NFR SPEC AUTO: 11 %
MAGNESIUM SPEC-SCNC: 2.1 MG/DL (ref 1.6–2.3)
MCH RBC QN AUTO: 26.5 PG (ref 25–35)
MCHC RBC AUTO-ENTMCNC: 29.9 G/DL (ref 31–37)
MCV RBC AUTO: 88.6 FL (ref 80–100)
MONOCYTES # BLD AUTO: 0.3 K/UL (ref 0–1)
MONOCYTES NFR BLD AUTO: 5 %
NEUTROPHILS # BLD AUTO: 5.1 K/UL (ref 1.3–7.7)
NEUTROPHILS NFR BLD AUTO: 81 %
PLATELET # BLD AUTO: 517 K/UL (ref 150–450)
POTASSIUM SERPL-SCNC: 4.6 MMOL/L (ref 3.5–5.1)
PROT SERPL-MCNC: 6.3 G/DL (ref 6.3–8.2)
PT BLD: 11.5 SEC (ref 9–12)
SODIUM SERPL-SCNC: 136 MMOL/L (ref 137–145)
WBC # BLD AUTO: 6.3 K/UL (ref 3.8–10.6)

## 2022-10-12 PROCEDURE — 80053 COMPREHEN METABOLIC PANEL: CPT

## 2022-10-12 PROCEDURE — 85730 THROMBOPLASTIN TIME PARTIAL: CPT

## 2022-10-12 PROCEDURE — 83735 ASSAY OF MAGNESIUM: CPT

## 2022-10-12 PROCEDURE — 99285 EMERGENCY DEPT VISIT HI MDM: CPT

## 2022-10-12 PROCEDURE — 94640 AIRWAY INHALATION TREATMENT: CPT

## 2022-10-12 PROCEDURE — 96375 TX/PRO/DX INJ NEW DRUG ADDON: CPT

## 2022-10-12 PROCEDURE — 93005 ELECTROCARDIOGRAM TRACING: CPT

## 2022-10-12 PROCEDURE — 85025 COMPLETE CBC W/AUTO DIFF WBC: CPT

## 2022-10-12 PROCEDURE — 84145 PROCALCITONIN (PCT): CPT

## 2022-10-12 PROCEDURE — 94760 N-INVAS EAR/PLS OXIMETRY 1: CPT

## 2022-10-12 PROCEDURE — 85610 PROTHROMBIN TIME: CPT

## 2022-10-12 PROCEDURE — 96365 THER/PROPH/DIAG IV INF INIT: CPT

## 2022-10-12 PROCEDURE — 82140 ASSAY OF AMMONIA: CPT

## 2022-10-12 PROCEDURE — 71046 X-RAY EXAM CHEST 2 VIEWS: CPT

## 2022-10-12 PROCEDURE — 36415 COLL VENOUS BLD VENIPUNCTURE: CPT

## 2022-10-12 PROCEDURE — 83880 ASSAY OF NATRIURETIC PEPTIDE: CPT

## 2022-10-12 PROCEDURE — 80048 BASIC METABOLIC PNL TOTAL CA: CPT

## 2022-10-12 PROCEDURE — 96366 THER/PROPH/DIAG IV INF ADDON: CPT

## 2022-10-12 PROCEDURE — 84484 ASSAY OF TROPONIN QUANT: CPT

## 2022-10-12 PROCEDURE — 87040 BLOOD CULTURE FOR BACTERIA: CPT

## 2022-10-12 NOTE — ED
Medical Decision Making





- Lab Data


Result diagrams: 


                                 10/12/22 21:05





                                 10/12/22 21:05





                                   Lab Results











  10/12/22 10/12/22 10/12/22 Range/Units





  21:05 21:05 21:05 


 


WBC  6.3    (3.8-10.6)  k/uL


 


RBC  3.43 L    (4.30-5.90)  m/uL


 


Hgb  9.1 L    (13.0-17.5)  gm/dL


 


Hct  30.4 L    (39.0-53.0)  %


 


MCV  88.6    (80.0-100.0)  fL


 


MCH  26.5    (25.0-35.0)  pg


 


MCHC  29.9 L    (31.0-37.0)  g/dL


 


RDW  15.8 H    (11.5-15.5)  %


 


Plt Count  517 H    (150-450)  k/uL


 


MPV  7.1    


 


Neutrophils %  81    %


 


Lymphocytes %  11    %


 


Monocytes %  5    %


 


Eosinophils %  2    %


 


Basophils %  0    %


 


Neutrophils #  5.1    (1.3-7.7)  k/uL


 


Lymphocytes #  0.7 L    (1.0-4.8)  k/uL


 


Monocytes #  0.3    (0-1.0)  k/uL


 


Eosinophils #  0.1    (0-0.7)  k/uL


 


Basophils #  0.0    (0-0.2)  k/uL


 


Hypochromasia  Marked    


 


PT   11.5   (9.0-12.0)  sec


 


INR   1.1   (<1.2)  


 


APTT   33.5 H   (22.0-30.0)  sec


 


Sodium    136 L  (137-145)  mmol/L


 


Potassium    4.6  (3.5-5.1)  mmol/L


 


Chloride    97 L  ()  mmol/L


 


Carbon Dioxide    31 H  (22-30)  mmol/L


 


Anion Gap    8  mmol/L


 


BUN    19  (9-20)  mg/dL


 


Creatinine    0.60 L  (0.66-1.25)  mg/dL


 


Est GFR (CKD-EPI)AfAm    >90  (>60 ml/min/1.73 sqM)  


 


Est GFR (CKD-EPI)NonAf    >90  (>60 ml/min/1.73 sqM)  


 


Glucose    104 H  (74-99)  mg/dL


 


POC Glucose (mg/dL)     ()  mg/dL


 


POC Glu Operater ID     


 


Calcium    8.2 L  (8.4-10.2)  mg/dL


 


Magnesium    2.1  (1.6-2.3)  mg/dL


 


Total Bilirubin    0.4  (0.2-1.3)  mg/dL


 


AST    23  (17-59)  U/L


 


ALT    13  (4-49)  U/L


 


Alkaline Phosphatase    88  ()  U/L


 


Troponin I     (0.000-0.034)  ng/mL


 


NT-Pro-B Natriuret Pep     pg/mL


 


Total Protein    6.3  (6.3-8.2)  g/dL


 


Albumin    2.7 L  (3.5-5.0)  g/dL














  10/12/22 10/12/22 10/12/22 Range/Units





  21:05 21:05 21:14 


 


WBC     (3.8-10.6)  k/uL


 


RBC     (4.30-5.90)  m/uL


 


Hgb     (13.0-17.5)  gm/dL


 


Hct     (39.0-53.0)  %


 


MCV     (80.0-100.0)  fL


 


MCH     (25.0-35.0)  pg


 


MCHC     (31.0-37.0)  g/dL


 


RDW     (11.5-15.5)  %


 


Plt Count     (150-450)  k/uL


 


MPV     


 


Neutrophils %     %


 


Lymphocytes %     %


 


Monocytes %     %


 


Eosinophils %     %


 


Basophils %     %


 


Neutrophils #     (1.3-7.7)  k/uL


 


Lymphocytes #     (1.0-4.8)  k/uL


 


Monocytes #     (0-1.0)  k/uL


 


Eosinophils #     (0-0.7)  k/uL


 


Basophils #     (0-0.2)  k/uL


 


Hypochromasia     


 


PT     (9.0-12.0)  sec


 


INR     (<1.2)  


 


APTT     (22.0-30.0)  sec


 


Sodium     (137-145)  mmol/L


 


Potassium     (3.5-5.1)  mmol/L


 


Chloride     ()  mmol/L


 


Carbon Dioxide     (22-30)  mmol/L


 


Anion Gap     mmol/L


 


BUN     (9-20)  mg/dL


 


Creatinine     (0.66-1.25)  mg/dL


 


Est GFR (CKD-EPI)AfAm     (>60 ml/min/1.73 sqM)  


 


Est GFR (CKD-EPI)NonAf     (>60 ml/min/1.73 sqM)  


 


Glucose     (74-99)  mg/dL


 


POC Glucose (mg/dL)    96  ()  mg/dL


 


POC Glu Operater ID    Shu Mckeon  


 


Calcium     (8.4-10.2)  mg/dL


 


Magnesium     (1.6-2.3)  mg/dL


 


Total Bilirubin     (0.2-1.3)  mg/dL


 


AST     (17-59)  U/L


 


ALT     (4-49)  U/L


 


Alkaline Phosphatase     ()  U/L


 


Troponin I  0.024    (0.000-0.034)  ng/mL


 


NT-Pro-B Natriuret Pep   1640   pg/mL


 


Total Protein     (6.3-8.2)  g/dL


 


Albumin     (3.5-5.0)  g/dL














- EKG Data


-: EKG Interpreted by Me


EKG shows normal: sinus rhythm


EKG Comments: 





Sinus rhythm of 86 QRS anyone QT since /383 low-voltage nonspecific ST 

configuration artifact present





Disposition


Clinical Impression: 


 CHF (congestive heart failure), COPD with exacerbation, Squamous cell lung 

cancer, Atypical chest pain





Disposition: ADMITTED AS IP TO THIS HOSP


Condition: Fair


Referrals: 


Jesica Arora MD [Primary Care Provider] - 1-2 days


Decision Date: 10/12/22


Decision Time: 22:50

## 2022-10-12 NOTE — XR
EXAMINATION TYPE: XR chest 2V

 

DATE OF EXAM: 10/12/2022

 

COMPARISON: 4/26/2022

 

HISTORY: Chest pain

 

TECHNIQUE:

 

FINDINGS: There is extensive infiltrate in pleural thickening in the left hemithorax. There is some d
iffuse infiltrate in the right lung. Heart size is top normal. No obvious heart failure.

 

IMPRESSION: Bilateral pulmonary infiltrates show significant increase compared to the old exam there 
is increasing pleural thickening in the lateral left lung and left lung apex compared to old exam. Th
is is consistent with progression of malignancy. This could be mesothelioma.

## 2022-10-12 NOTE — ED
Chest Pain HPI





- General


Stated Complaint: Chest Pain


Time Seen by Provider: 10/12/22 20:48


Source: patient, RN notes reviewed, old records reviewed





- History of Present Illness


Initial Comments: 





61-year-old male with a history of squamous cell lung cancer left upper lung 

with metastasis also history of COPD who presents with the onset tonight of 

chest pain and left-sided mostly sharp in nature he did get some improvement 

after nitro given by paramedics.  No recent fevers chills sweats he does have a 

cough he did not use his inhaler today he states.  No other current complaints 

or modifying factors.


MD Complaint: chest pain





- Related Data


                                Home Medications











 Medication  Instructions  Recorded  Confirmed


 


Acetaminophen Tab [Tylenol] 1,000 mg PO Q6HR PRN 09/03/21 10/12/22


 


Albuterol Sulfate [Ventolin HFA] 1 - 2 puff INHALATION RT-Q4H PRN 09/03/21 

10/12/22


 


Morphine Sulfate [Ms Contin] 30 mg PO Q12H 04/18/22 10/12/22


 


Albuterol Nebulized [Ventolin 2.5 mg INHALATION RT-QID PRN 09/17/22 10/12/22





Nebulized]   


 


Ibuprofen [Motrin Ib] 400 mg PO Q6H PRN 09/17/22 10/12/22


 


Morphine Sulfate ER [Ms Contin] 15 mg PO Q12H 09/17/22 10/12/22


 


diazePAM [Valium] 5 mg PO DAILY PRN 09/17/22 10/12/22


 


ondansetron HCL [Zofran] 8 mg PO TID PRN 09/17/22 10/12/22


 


Furosemide [Lasix] 20 mg PO DAILY PRN 10/12/22 10/12/22


 


Pregabalin [Lyrica] 100 mg PO BID 10/12/22 10/12/22








                                  Previous Rx's











 Medication  Instructions  Recorded


 


Lidocaine 5% Patch [Lidoderm 5% 1 patch TOPICAL DAILY #30 patch 09/18/22





Patch]  


 


Loratadine [Claritin] 10 mg PO DAILY #30 tab 09/18/22











                                    Allergies











Allergy/AdvReac Type Severity Reaction Status Date / Time


 


No Known Allergies Allergy   Verified 10/12/22 22:10














Review of Systems


ROS Statement: 


Those systems with pertinent positive or pertinent negative responses have been 

documented in the HPI.





ROS Other: All systems not noted in ROS Statement are negative.





Past Medical History


Past Medical History: Asthma, Heart Failure, COPD, Diabetes Mellitus, 

Hypertension


Additional Past Medical History / Comment(s): Uses home O2 as needed only. 

Squamous cell carcinoma of the lung, rib lesion and lymph adenopathy, shoulder 

pain related to lung mass. Pancous tumor left apex


History of Any Multi-Drug Resistant Organisms: MRSA


Date of last positivie culture/infection: 6/23/16


MDRO Source:: Left Foot


Past Surgical History: Hernia Repair, Orthopedic Surgery, Tonsillectomy


Additional Past Surgical History / Comment(s): lt. 5th toe amp


Past Anesthesia/Blood Transfusion Reactions: No Reported Reaction


Past Psychological History: No Psychological Hx Reported


Smoking Status: Former smoker


Past Alcohol Use History: None Reported


Past Drug Use History: None Reported





- Past Family History


  ** Sister(s)


Family Medical History: Cancer





  ** Father


Family Medical History: Congestive Heart Failure (CHF), Diabetes Mellitus





  ** Mother


Family Medical History: Renal Disease





General Exam





- General Exam Comments


Initial Comments: 





This is a well-developed nourished awake alert oriented 4 male


General appearance: alert, in no apparent distress


Head exam: Present: atraumatic, normocephalic, normal inspection


Eye exam: Present: normal appearance, PERRL, EOMI.  Absent: scleral icterus, 

conjunctival injection, periorbital swelling


ENT exam: Present: normal exam, mucous membranes moist


Neck exam: Present: normal inspection, full ROM, other (Stridor JVD or bruits). 

 Absent: tenderness, meningismus, lymphadenopathy


Respiratory exam: Present: wheezes (Scattered wheezes), decreased breath sounds,

 other (Patient does demonstrate kyphosis).  Absent: respiratory distress, 

rales, rhonchi, stridor


Cardiovascular Exam: Present: regular rate, normal rhythm, normal heart sounds. 

 Absent: systolic murmur, diastolic murmur, rubs, gallop, clicks


GI/Abdominal exam: Present: soft, normal bowel sounds.  Absent: distended, 

tenderness, guarding, rebound, rigid


Extremities exam: Present: full ROM, normal capillary refill, pedal edema, other

 (Lymphedema to the left upper extremity which is chronic.  This he does 

demonstrate right lateral peripheral lower edema with stasis dermatitis.).  

Absent: tenderness, joint swelling, calf tenderness


Back exam: Present: normal inspection


Neurological exam: Present: alert, oriented X3, CN II-XII intact


Psychiatric exam: Present: normal affect, normal mood


Skin exam: Present: warm, dry, intact, other (Noted above).  Absent: rash





Course


                                   Vital Signs











  10/12/22 10/12/22





  20:54 21:04


 


Temperature 97.7 F 


 


Pulse Rate 83 


 


Pulse Rate [  86





Cardiac Monitor  





]  


 


Respiratory 20 





Rate  


 


Blood Pressure 123/71 


 


O2 Sat by Pulse 98 





Oximetry  














Chest Pain MDM





- MDM





Image reviewed his old report evidence of increased pulmonary vessel markings.  

Patient apparently is been demonstrating evidence of increased confusion 

question whether it was the new medication the generic form of Lyrica versus 

progression of the patient's underlying squamous cell carcinoma along.  He also 

does demonstrate evidence of increased tone or vascular congestion secondary to 

the evidence of elevated BNP.  Patient will be admitted for inpatient evaluation

and treatment sling for Dr. block as not as consulted





Disposition


Clinical Impression: 


 CHF (congestive heart failure), COPD with exacerbation, Squamous cell lung 

cancer





Disposition: ADMITTED AS IP TO THIS HOSP


Condition: Fair


Referrals: 


Jesica Arora MD [Primary Care Provider] - 1-2 days


Decision Date: 10/12/22


Decision Time: 22:49

## 2022-10-13 LAB
GLUCOSE BLD-MCNC: 155 MG/DL (ref 70–110)
GLUCOSE BLD-MCNC: 201 MG/DL (ref 70–110)

## 2022-10-13 RX ADMIN — HYDROMORPHONE HYDROCHLORIDE PRN MG: 1 INJECTION, SOLUTION INTRAMUSCULAR; INTRAVENOUS; SUBCUTANEOUS at 14:32

## 2022-10-13 RX ADMIN — METHYLPREDNISOLONE SODIUM SUCCINATE SCH MG: 125 INJECTION, POWDER, FOR SOLUTION INTRAMUSCULAR; INTRAVENOUS at 08:40

## 2022-10-13 RX ADMIN — IPRATROPIUM BROMIDE AND ALBUTEROL SULFATE SCH ML: .5; 3 SOLUTION RESPIRATORY (INHALATION) at 07:39

## 2022-10-13 RX ADMIN — LORATADINE SCH MG: 10 TABLET ORAL at 08:41

## 2022-10-13 RX ADMIN — METHYLPREDNISOLONE SODIUM SUCCINATE SCH MG: 125 INJECTION, POWDER, FOR SOLUTION INTRAMUSCULAR; INTRAVENOUS at 15:11

## 2022-10-13 RX ADMIN — MORPHINE SULFATE SCH MG: 30 TABLET, FILM COATED, EXTENDED RELEASE ORAL at 20:44

## 2022-10-13 RX ADMIN — IPRATROPIUM BROMIDE AND ALBUTEROL SULFATE SCH ML: .5; 3 SOLUTION RESPIRATORY (INHALATION) at 19:43

## 2022-10-13 RX ADMIN — HYDROMORPHONE HYDROCHLORIDE PRN MG: 1 INJECTION, SOLUTION INTRAMUSCULAR; INTRAVENOUS; SUBCUTANEOUS at 09:45

## 2022-10-13 RX ADMIN — IPRATROPIUM BROMIDE AND ALBUTEROL SULFATE SCH ML: .5; 3 SOLUTION RESPIRATORY (INHALATION) at 15:34

## 2022-10-13 RX ADMIN — LORATADINE SCH: 10 TABLET ORAL at 10:45

## 2022-10-13 RX ADMIN — PREGABALIN SCH MG: 100 CAPSULE ORAL at 08:41

## 2022-10-13 RX ADMIN — IPRATROPIUM BROMIDE AND ALBUTEROL SULFATE SCH ML: .5; 3 SOLUTION RESPIRATORY (INHALATION) at 11:20

## 2022-10-13 RX ADMIN — MORPHINE SULFATE SCH MG: 30 TABLET, FILM COATED, EXTENDED RELEASE ORAL at 15:29

## 2022-10-13 RX ADMIN — ENOXAPARIN SODIUM SCH MG: 40 INJECTION SUBCUTANEOUS at 21:58

## 2022-10-13 RX ADMIN — METHYLPREDNISOLONE SODIUM SUCCINATE SCH: 125 INJECTION, POWDER, FOR SOLUTION INTRAMUSCULAR; INTRAVENOUS at 00:13

## 2022-10-13 RX ADMIN — PREGABALIN SCH MG: 100 CAPSULE ORAL at 20:44

## 2022-10-13 RX ADMIN — SODIUM CHLORIDE SCH MLS/HR: 9 INJECTION, SOLUTION INTRAVENOUS at 21:58

## 2022-10-13 RX ADMIN — DOCUSATE SODIUM SCH MG: 100 CAPSULE, LIQUID FILLED ORAL at 21:58

## 2022-10-13 RX ADMIN — METHYLPREDNISOLONE SODIUM SUCCINATE SCH: 125 INJECTION, POWDER, FOR SOLUTION INTRAMUSCULAR; INTRAVENOUS at 20:29

## 2022-10-13 RX ADMIN — HYDROMORPHONE HYDROCHLORIDE PRN MG: 1 INJECTION, SOLUTION INTRAMUSCULAR; INTRAVENOUS; SUBCUTANEOUS at 00:23

## 2022-10-13 NOTE — P.CONS
History of Present Illness





- Reason for Consult


Consult date: 10/13/22


History of metastatic lung cancer





- Chief Complaint


Left chest pain





- History of Present Illness





Mr. Epps is a 61-year-old gentleman with a past medical history significant 

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 

bilaterally currently on nivolumab every 2 weeks (last given about one week ago)

who presents to the ED with left chest discomfort.  He noted that this pain in 

the left chest wall waking him from sleep overnight.  Upon further questioning, 

he notes having this progressive erythema and warmth of the left chest wall and 

left arm over the past week.  He denies any known fevers or chills.  He denies 

any worsening dyspnea than usual, cough, or hemoptysis.  He denies any rash 

elsewhere.  Given the chest pain, he presented to the ED for additional ma

nagement and monitoring.





In the ED, he does have heart rates in the 100s, but is otherwise hemodyna

mically stable and afebrile.  He is on 2-3 L nasal cannula of supplemental 

oxygen, which he is on at home.  CMP and CBC do not reveal any acute metabolic 

or cell count abnormalities.  Troponin was at 0.024 with NT pro BNP at 1640.  

EKG revealed no ST segment elevation or depression or Q wave inversion 

suggestive of acute ischemia.  Chest x-ray noted bilateral pulmonary infiltrates

which were increased from his prior x-ray in April 2022.  Increased pleural 

thickening of the lateral left lung and the left lung apex was noted.  He was 

started on Solu-Medrol IV for treatment of potential COPD exacerbation.





Review of Systems





14 point review of systems conducted with pertinent positives and negatives as 

noted per HPI





Past Medical History


Past Medical History: Asthma, Heart Failure, COPD, Diabetes Mellitus, 

Hypertension


Additional Past Medical History / Comment(s): Uses home O2 as needed only. 

Squamous cell carcinoma of the lung, rib lesion and lymph adenopathy, shoulder 

pain related to lung mass. Pancous tumor left apex


History of Any Multi-Drug Resistant Organisms: MRSA


Year Discovered:: 6/23/16


MDRO Source:: Left Foot


Past Surgical History: Hernia Repair, Orthopedic Surgery, Tonsillectomy


Additional Past Surgical History / Comment(s): lt. 5th toe amp


Past Anesthesia/Blood Transfusion Reactions: No Reported Reaction


Past Psychological History: No Psychological Hx Reported


Smoking Status: Former smoker


Past Alcohol Use History: None Reported


Past Drug Use History: None Reported





- Past Family History


  ** Sister(s)


Family Medical History: Cancer





  ** Father


Family Medical History: Congestive Heart Failure (CHF), Diabetes Mellitus





  ** Mother


Family Medical History: Renal Disease





Medications and Allergies


                                Home Medications











 Medication  Instructions  Recorded  Confirmed  Type


 


Acetaminophen Tab [Tylenol] 1,000 mg PO Q6HR PRN 09/03/21 10/12/22 History


 


Albuterol Sulfate [Ventolin HFA] 1 - 2 puff INHALATION RT-Q4H PRN 09/03/21 

10/12/22 History


 


Morphine Sulfate [Ms Contin] 30 mg PO Q12H 04/18/22 10/12/22 History


 


Albuterol Nebulized [Ventolin 2.5 mg INHALATION RT-QID PRN 09/17/22 10/12/22 

History





Nebulized]    


 


Ibuprofen [Motrin Ib] 400 mg PO Q6H PRN 09/17/22 10/12/22 History


 


Morphine Sulfate ER [Ms Contin] 15 mg PO Q12H 09/17/22 10/12/22 History


 


diazePAM [Valium] 5 mg PO DAILY PRN 09/17/22 10/12/22 History


 


ondansetron HCL [Zofran] 8 mg PO TID PRN 09/17/22 10/12/22 History


 


Lidocaine 5% Patch [Lidoderm 5% 1 patch TOPICAL DAILY #30 patch 09/18/22 

10/12/22 Rx





Patch]    


 


Loratadine [Claritin] 10 mg PO DAILY #30 tab 09/18/22 10/12/22 Rx


 


Furosemide [Lasix] 20 mg PO DAILY PRN 10/12/22 10/12/22 History


 


Pregabalin [Lyrica] 100 mg PO BID 10/12/22 10/12/22 History








                                    Allergies











Allergy/AdvReac Type Severity Reaction Status Date / Time


 


No Known Allergies Allergy   Verified 10/12/22 22:10














Physical Exam


Vitals: 


                                   Vital Signs











  Temp Pulse Pulse Resp BP Pulse Ox


 


 10/13/22 11:29   106 H    


 


 10/13/22 11:22   103 H    


 


 10/13/22 07:48   100    


 


 10/13/22 07:41   101 H     96


 


 10/13/22 07:27  98.7 F  104 H   18  137/90  94 L


 


 10/13/22 04:38  97.9 F  104 H   24  148/112  94 L


 


 10/13/22 03:00      147/75 


 


 10/13/22 02:16  98.3 F     


 


 10/12/22 21:04    86   


 


 10/12/22 20:54  97.7 F  83   20  123/71  98








                                Intake and Output











 10/12/22 10/13/22 10/13/22





 22:59 06:59 14:59


 


Other:   


 


  Weight 114.759 kg  














Seated hunched over in the bed





- Constitutional


General appearance: disheveled, mild distress, obese





- EENT


Eyes: EOMI





- Respiratory


Respiratory: bilateral: other (Inspiratory crackles noted in the bilateral upper

 and lower lung fields)





- Cardiovascular


Rhythm: regular


Abnormal Heart Sounds: systolic murmur





- Gastrointestinal


General gastrointestinal: normal bowel sounds, no tenderness





- Integumentary





Swelling, warmth, and erythema of the left chest wall at the left pectoralis 

extending to the lateral left chest wall and also down the left arm


Integumentary: cellulitis





Results


CBC & Chem 7: 


                                 10/12/22 21:05





                                 10/12/22 21:05


Labs: 


                  Abnormal Lab Results - Last 24 Hours (Table)











  10/12/22 10/12/22 10/12/22 Range/Units





  21:05 21:05 21:05 


 


RBC  3.43 L    (4.30-5.90)  m/uL


 


Hgb  9.1 L    (13.0-17.5)  gm/dL


 


Hct  30.4 L    (39.0-53.0)  %


 


MCHC  29.9 L    (31.0-37.0)  g/dL


 


RDW  15.8 H    (11.5-15.5)  %


 


Plt Count  517 H    (150-450)  k/uL


 


Lymphocytes #  0.7 L    (1.0-4.8)  k/uL


 


APTT   33.5 H   (22.0-30.0)  sec


 


Sodium    136 L  (137-145)  mmol/L


 


Chloride    97 L  ()  mmol/L


 


Carbon Dioxide    31 H  (22-30)  mmol/L


 


Creatinine    0.60 L  (0.66-1.25)  mg/dL


 


Glucose    104 H  (74-99)  mg/dL


 


POC Glucose (mg/dL)     ()  mg/dL


 


Calcium    8.2 L  (8.4-10.2)  mg/dL


 


Albumin    2.7 L  (3.5-5.0)  g/dL














  10/13/22 Range/Units





  04:45 


 


RBC   (4.30-5.90)  m/uL


 


Hgb   (13.0-17.5)  gm/dL


 


Hct   (39.0-53.0)  %


 


MCHC   (31.0-37.0)  g/dL


 


RDW   (11.5-15.5)  %


 


Plt Count   (150-450)  k/uL


 


Lymphocytes #   (1.0-4.8)  k/uL


 


APTT   (22.0-30.0)  sec


 


Sodium   (137-145)  mmol/L


 


Chloride   ()  mmol/L


 


Carbon Dioxide   (22-30)  mmol/L


 


Creatinine   (0.66-1.25)  mg/dL


 


Glucose   (74-99)  mg/dL


 


POC Glucose (mg/dL)  155 H  ()  mg/dL


 


Calcium   (8.4-10.2)  mg/dL


 


Albumin   (3.5-5.0)  g/dL











Abdominal x-ray: report reviewed





Assessment and Plan


Assessment: 





Mr. Epps is a 61-year-old gentleman with a past medical history significant 

for metastatic squamous cell carcinoma of the lung with metastases to the lungs 

bilaterally currently on nivolumab presenting with increased discomfort of the 

left chest wall with swelling, warmth, and erythema suggestive of cellulitis.


(1) Cellulitis


Current Visit: Yes   Status: Acute   Code(s): L03.90 - CELLULITIS, UNSPECIFIED  

 SNOMED Code(s): 278299306


   





(2) Squamous cell lung cancer


Current Visit: Yes   Status: Acute   Code(s): C34.90 - MALIGNANT NEOPLASM OF 

UNSP PART OF UNSP BRONCHUS OR LUNG   SNOMED Code(s): 039517098


   


Plan: 





#Cellulitis


-Noted increased swelling, warmth, and erythema of the soft tissue of the left 

pectoralis and extending to the lateral chest wall and down the left arm over 

the past 1 week


-This has gotten progressively worse, which woke Mr. Epps from his sleep early

 this morning


-No evidence of ACS with no worsening of his breathing status currently


-Recommend obtaining blood cultures followed by empiric treatment with IV 

vancomycin


-If he develops worsening hypoxia, CTA for PE should be considered





#Metastatic squamous cell carcinoma


-Underwent concurrent chemo/radiation therapy from April to May 2022 for stage 

III lung cancer


-Found to have disease progression radiographically to the lungs bilaterally in 

August 2022


-He has been on second line nivolumab immunotherapy, but has noted to have 

clinical worsening in his performance status


-Per last clinic note on 10/04/2022, hospice had been discussed.  It appeared 

the patient however was interested in seeking a second opinion at the [facility]


-His daughter at bedside is interested in discussing hospice further.  Mr. Epps, however, notes that he wants to do whatever he can to live longer


-Once he is more comfortable from his cellulitis, further goals of care 

discussion could be had.  He is currently enrolled in the palliative care

## 2022-10-13 NOTE — P.HPIM
History of Present Illness


H&P Date: 10/13/22





Patient is a 61-year-old male with a known history of squamous cell carcinoma of

the lung with metastasis and is on immunotherapy, hypertension, diabetes type 2,

COPD and previous history of smoking presents to ER with complaints of left-

sided chest pain.  Patient was recently sharp left-sided chest pain.  EMS gave 

nitro which seemed to improve symptoms.  Patient also complains of redness of 

the left upper chest.  No fever no chills.  Does have cough without any sputum 

production.  Patient is also complaining of severe pain.


No complaints of nausea vomiting abdominal pain or diarrhea.  Denied any dysuria

or hematuria.  On admission patient was requiring 3 Choloxin with nasal cannula.

 Tachycardic with heart rate around 104.


Chest x-ray showed bilateral pulmonary infiltrates show significantly increased 

compared to old exam.  There is increasing pleural thickening in the left 

lateral lung and left lung apex compared to old exam.  This is consistent with 

progression of malignancy.  This could be mesothelioma.


EKG showed supraventricular rhythm.


Laboratory data showed WBC 6.3 hemoglobin 9.1 and platelets 517


Sodium 136 potassium 4.6 chloride 97 bicarb is 31 BUN 19 and creatinine 0.6 b

lood sugar is 104 calcium 8.2 magnesium 2.1


Ammonia 18 troponin 0.024, proBNP 1640.  Albumin 2.7.














Acute left upper chest pain with redness over the anterior chest and swelling 

and cellulitis.


Chest pain.  Rule out ACS.


Squamous cell carcinoma of the lung with progression as per chest x-ray findi

ngs.  Patient is status post chemo/radiation and currently on immunotherapy 

every 2 weeks.


Hypovolemic hyponatremia


Malignancy related pain


Anemia of chronic disease


COPD/asthma with mild exacerbation.  On oxygen at 2 L via nasal cannula.


Prior history of smoking


Hypertension


Diabetes type 2 non-insulin-dependent


DVT prophylaxis with Lovenox subcu.





Plan:


Patient will be continued on oxygen supplementation via nasal cannula.  Continue

with IV steroids and duo nebs and also start antibiotics in the form of 

vancomycin for left upper chest cellulitis.  Appreciate oncology 

recommendations.


Continue with insulin sliding scale and other home medications.


Continued pain management, bowel regimen and follow-up closely.  Prognosis poor 

at this time.  Will discuss with family regarding CODE STATUS.








Past Medical History


Past Medical History: Asthma, Heart Failure, COPD, Diabetes Mellitus, 

Hypertension


Additional Past Medical History / Comment(s): Uses home O2 as needed only. 

Squamous cell carcinoma of the lung, rib lesion and lymph adenopathy, shoulder 

pain related to lung mass. Pancous tumor left apex


History of Any Multi-Drug Resistant Organisms: MRSA


Date of last positivie culture/infection: 6/23/16


MDRO Source:: Left Foot


Past Surgical History: Hernia Repair, Orthopedic Surgery, Tonsillectomy


Additional Past Surgical History / Comment(s): lt. 5th toe amp


Past Anesthesia/Blood Transfusion Reactions: No Reported Reaction


Past Psychological History: No Psychological Hx Reported


Smoking Status: Former smoker


Past Alcohol Use History: None Reported


Past Drug Use History: None Reported





- Past Family History


  ** Sister(s)


Family Medical History: Cancer





  ** Father


Family Medical History: Congestive Heart Failure (CHF), Diabetes Mellitus





  ** Mother


Family Medical History: Renal Disease





Medications and Allergies


                                Home Medications











 Medication  Instructions  Recorded  Confirmed  Type


 


Acetaminophen Tab [Tylenol] 1,000 mg PO Q6HR PRN 09/03/21 10/12/22 History


 


Albuterol Sulfate [Ventolin HFA] 1 - 2 puff INHALATION RT-Q4H PRN 09/03/21 

10/12/22 History


 


Morphine Sulfate [Ms Contin] 30 mg PO Q12H 04/18/22 10/12/22 History


 


Albuterol Nebulized [Ventolin 2.5 mg INHALATION RT-QID PRN 09/17/22 10/12/22 

History





Nebulized]    


 


Ibuprofen [Motrin Ib] 400 mg PO Q6H PRN 09/17/22 10/12/22 History


 


Morphine Sulfate ER [Ms Contin] 15 mg PO Q12H 09/17/22 10/12/22 History


 


diazePAM [Valium] 5 mg PO DAILY PRN 09/17/22 10/12/22 History


 


ondansetron HCL [Zofran] 8 mg PO TID PRN 09/17/22 10/12/22 History


 


Lidocaine 5% Patch [Lidoderm 5% 1 patch TOPICAL DAILY #30 patch 09/18/22 

10/12/22 Rx





Patch]    


 


Loratadine [Claritin] 10 mg PO DAILY #30 tab 09/18/22 10/12/22 Rx


 


Furosemide [Lasix] 20 mg PO DAILY PRN 10/12/22 10/12/22 History


 


Pregabalin [Lyrica] 100 mg PO BID 10/12/22 10/12/22 History








                                    Allergies











Allergy/AdvReac Type Severity Reaction Status Date / Time


 


loratadine [From Claritin-D] AdvReac  Confusion Verified 10/13/22 19:31


 


pseudoephedrine AdvReac  Confusion Verified 10/13/22 19:31





[From Claritin-D]     














Physical Exam


Vitals: 


                                   Vital Signs











  Temp Pulse Pulse Resp BP Pulse Ox


 


 10/13/22 07:48   100    


 


 10/13/22 07:41   101 H     96


 


 10/13/22 07:27  98.7 F  104 H   18  137/90  94 L


 


 10/13/22 04:38  97.9 F  104 H   24  148/112  94 L


 


 10/13/22 03:00      147/75 


 


 10/13/22 02:16  98.3 F     


 


 10/12/22 21:04    86   


 


 10/12/22 20:54  97.7 F  83   20  123/71  98








                                Intake and Output











 10/12/22 10/13/22 10/13/22





 22:59 06:59 14:59


 


Other:   


 


  Weight 114.759 kg  














Results


CBC & Chem 7: 


                                 10/12/22 21:05





                                 10/12/22 21:05


Labs: 


                  Abnormal Lab Results - Last 24 Hours (Table)











  10/12/22 10/12/22 10/12/22 Range/Units





  21:05 21:05 21:05 


 


RBC  3.43 L    (4.30-5.90)  m/uL


 


Hgb  9.1 L    (13.0-17.5)  gm/dL


 


Hct  30.4 L    (39.0-53.0)  %


 


MCHC  29.9 L    (31.0-37.0)  g/dL


 


RDW  15.8 H    (11.5-15.5)  %


 


Plt Count  517 H    (150-450)  k/uL


 


Lymphocytes #  0.7 L    (1.0-4.8)  k/uL


 


APTT   33.5 H   (22.0-30.0)  sec


 


Sodium    136 L  (137-145)  mmol/L


 


Chloride    97 L  ()  mmol/L


 


Carbon Dioxide    31 H  (22-30)  mmol/L


 


Creatinine    0.60 L  (0.66-1.25)  mg/dL


 


Glucose    104 H  (74-99)  mg/dL


 


POC Glucose (mg/dL)     ()  mg/dL


 


Calcium    8.2 L  (8.4-10.2)  mg/dL


 


Albumin    2.7 L  (3.5-5.0)  g/dL














  10/13/22 Range/Units





  04:45 


 


RBC   (4.30-5.90)  m/uL


 


Hgb   (13.0-17.5)  gm/dL


 


Hct   (39.0-53.0)  %


 


MCHC   (31.0-37.0)  g/dL


 


RDW   (11.5-15.5)  %


 


Plt Count   (150-450)  k/uL


 


Lymphocytes #   (1.0-4.8)  k/uL


 


APTT   (22.0-30.0)  sec


 


Sodium   (137-145)  mmol/L


 


Chloride   ()  mmol/L


 


Carbon Dioxide   (22-30)  mmol/L


 


Creatinine   (0.66-1.25)  mg/dL


 


Glucose   (74-99)  mg/dL


 


POC Glucose (mg/dL)  155 H  ()  mg/dL


 


Calcium   (8.4-10.2)  mg/dL


 


Albumin   (3.5-5.0)  g/dL

## 2022-10-14 LAB
ANION GAP SERPL CALC-SCNC: 8.1 MMOL/L (ref 10–18)
BASOPHILS # BLD AUTO: 0 K/UL (ref 0–0.2)
BASOPHILS NFR BLD AUTO: 0 %
BUN SERPL-SCNC: 16.9 MG/DL (ref 9–27)
BUN/CREAT SERPL: 28.17 RATIO (ref 12–20)
CALCIUM SPEC-MCNC: 8.7 MG/DL (ref 8.7–10.3)
CHLORIDE SERPL-SCNC: 99 MMOL/L (ref 96–109)
CO2 SERPL-SCNC: 30.9 MMOL/L (ref 20–27.5)
EOSINOPHIL # BLD AUTO: 0 K/UL (ref 0–0.7)
EOSINOPHIL NFR BLD AUTO: 0 %
ERYTHROCYTE [DISTWIDTH] IN BLOOD BY AUTOMATED COUNT: 3.7 M/UL (ref 4.3–5.9)
ERYTHROCYTE [DISTWIDTH] IN BLOOD: 15.8 % (ref 11.5–15.5)
GLUCOSE BLD-MCNC: 163 MG/DL (ref 70–110)
GLUCOSE BLD-MCNC: 177 MG/DL (ref 70–110)
GLUCOSE BLD-MCNC: 196 MG/DL (ref 70–110)
GLUCOSE BLD-MCNC: 201 MG/DL (ref 70–110)
GLUCOSE SERPL-MCNC: 149 MG/DL (ref 70–110)
HCT VFR BLD AUTO: 33.6 % (ref 39–53)
HGB BLD-MCNC: 9.8 GM/DL (ref 13–17.5)
LYMPHOCYTES # SPEC AUTO: 0.5 K/UL (ref 1–4.8)
LYMPHOCYTES NFR SPEC AUTO: 7 %
MCH RBC QN AUTO: 26.5 PG (ref 25–35)
MCHC RBC AUTO-ENTMCNC: 29.2 G/DL (ref 31–37)
MCV RBC AUTO: 90.7 FL (ref 80–100)
MONOCYTES # BLD AUTO: 0.2 K/UL (ref 0–1)
MONOCYTES NFR BLD AUTO: 3 %
NEUTROPHILS # BLD AUTO: 6.5 K/UL (ref 1.3–7.7)
NEUTROPHILS NFR BLD AUTO: 89 %
PLATELET # BLD AUTO: 614 K/UL (ref 150–450)
POTASSIUM SERPL-SCNC: 4.6 MMOL/L (ref 3.5–5.5)
SODIUM SERPL-SCNC: 138 MMOL/L (ref 135–145)
WBC # BLD AUTO: 7.3 K/UL (ref 3.8–10.6)

## 2022-10-14 RX ADMIN — DOCUSATE SODIUM SCH MG: 100 CAPSULE, LIQUID FILLED ORAL at 08:48

## 2022-10-14 RX ADMIN — MORPHINE SULFATE SCH MG: 30 TABLET, FILM COATED, EXTENDED RELEASE ORAL at 08:48

## 2022-10-14 RX ADMIN — IPRATROPIUM BROMIDE AND ALBUTEROL SULFATE SCH ML: .5; 3 SOLUTION RESPIRATORY (INHALATION) at 12:02

## 2022-10-14 RX ADMIN — PREGABALIN SCH MG: 100 CAPSULE ORAL at 08:49

## 2022-10-14 RX ADMIN — METHYLPREDNISOLONE SODIUM SUCCINATE SCH MG: 125 INJECTION, POWDER, FOR SOLUTION INTRAMUSCULAR; INTRAVENOUS at 12:31

## 2022-10-14 RX ADMIN — FUROSEMIDE SCH MG: 40 TABLET ORAL at 14:11

## 2022-10-14 RX ADMIN — MORPHINE SULFATE SCH MG: 30 TABLET, FILM COATED, EXTENDED RELEASE ORAL at 21:22

## 2022-10-14 RX ADMIN — METHYLPREDNISOLONE SODIUM SUCCINATE SCH MG: 125 INJECTION, POWDER, FOR SOLUTION INTRAMUSCULAR; INTRAVENOUS at 18:14

## 2022-10-14 RX ADMIN — IPRATROPIUM BROMIDE AND ALBUTEROL SULFATE SCH ML: .5; 3 SOLUTION RESPIRATORY (INHALATION) at 08:34

## 2022-10-14 RX ADMIN — FUROSEMIDE SCH MG: 40 TABLET ORAL at 21:25

## 2022-10-14 RX ADMIN — SODIUM CHLORIDE SCH MLS/HR: 9 INJECTION, SOLUTION INTRAVENOUS at 21:23

## 2022-10-14 RX ADMIN — INSULIN ASPART SCH UNIT: 100 INJECTION, SOLUTION INTRAVENOUS; SUBCUTANEOUS at 21:22

## 2022-10-14 RX ADMIN — METHYLPREDNISOLONE SODIUM SUCCINATE SCH MG: 125 INJECTION, POWDER, FOR SOLUTION INTRAMUSCULAR; INTRAVENOUS at 06:17

## 2022-10-14 RX ADMIN — DOCUSATE SODIUM SCH MG: 100 CAPSULE, LIQUID FILLED ORAL at 21:22

## 2022-10-14 RX ADMIN — SODIUM CHLORIDE SCH MLS/HR: 9 INJECTION, SOLUTION INTRAVENOUS at 08:51

## 2022-10-14 RX ADMIN — IPRATROPIUM BROMIDE AND ALBUTEROL SULFATE SCH ML: .5; 3 SOLUTION RESPIRATORY (INHALATION) at 19:13

## 2022-10-14 RX ADMIN — ENOXAPARIN SODIUM SCH MG: 40 INJECTION SUBCUTANEOUS at 08:48

## 2022-10-14 RX ADMIN — METHYLPREDNISOLONE SODIUM SUCCINATE SCH: 125 INJECTION, POWDER, FOR SOLUTION INTRAMUSCULAR; INTRAVENOUS at 05:41

## 2022-10-14 RX ADMIN — IPRATROPIUM BROMIDE AND ALBUTEROL SULFATE SCH ML: .5; 3 SOLUTION RESPIRATORY (INHALATION) at 05:26

## 2022-10-14 RX ADMIN — PREGABALIN SCH MG: 100 CAPSULE ORAL at 21:22

## 2022-10-14 RX ADMIN — LORATADINE SCH MG: 10 TABLET ORAL at 08:49

## 2022-10-14 RX ADMIN — HYDROMORPHONE HYDROCHLORIDE PRN MG: 1 INJECTION, SOLUTION INTRAMUSCULAR; INTRAVENOUS; SUBCUTANEOUS at 16:07

## 2022-10-14 RX ADMIN — IPRATROPIUM BROMIDE AND ALBUTEROL SULFATE SCH ML: .5; 3 SOLUTION RESPIRATORY (INHALATION) at 15:25

## 2022-10-14 RX ADMIN — INSULIN ASPART SCH UNIT: 100 INJECTION, SOLUTION INTRAVENOUS; SUBCUTANEOUS at 18:14

## 2022-10-14 NOTE — P.PN
Subjective


Progress Note Date: 10/14/22











Patient is a 61-year-old male with a known history of squamous cell carcinoma of

the lung with metastasis and is on immunotherapy, hypertension, diabetes type 2,

COPD and previous history of smoking presents to ER with complaints of left-

sided chest pain.  Patient was recently sharp left-sided chest pain.  EMS gave 

nitro which seemed to improve symptoms.  Patient also complains of redness of 

the left upper chest.  No fever no chills.  Does have cough without any sputum 

production.  Patient is also complaining of severe pain.


No complaints of nausea vomiting abdominal pain or diarrhea.  Denied any dysuria

or hematuria.  On admission patient was requiring 3 Choloxin with nasal cannula.

 Tachycardic with heart rate around 104.


Chest x-ray showed bilateral pulmonary infiltrates show significantly increased 

compared to old exam.  There is increasing pleural thickening in the left 

lateral lung and left lung apex compared to old exam.  This is consistent with 

progression of malignancy.  This could be mesothelioma.


EKG showed supraventricular rhythm.


Laboratory data showed WBC 6.3 hemoglobin 9.1 and platelets 517


Sodium 136 potassium 4.6 chloride 97 bicarb is 31 BUN 19 and creatinine 0.6 

blood sugar is 104 calcium 8.2 magnesium 2.1


Ammonia 18 troponin 0.024, proBNP 1640.  Albumin 2.7.





10/14/2022





Patient is seen and evaluated in follow-up with oncology following.  Patient is 

currently maintained on 6 L normally wears 2 L at home and oxygen saturation is 

93%.  Encouraged weaning as tolerated and patient was maintained on Lasix 20 mg 

daily although will increase to 40 mg twice daily.  Patient was also started on 

IV vancomycin by oncology for possible left chest cellulitis and swelling has 

significantly improved.  Left upper extremity also improved most likely appears 

to be swollen from lymphedema.  Patient currently enrolled in palliative care 

and discussion was had about possible hospice although patient is not willing to

proceed with hospice at this time and would like a second opinion at [facility].  Patient reports his swelling is improved and asking when he can go

home.  Oncology recommending staying for another day at least on IV antibiotics 

and reevaluation in the morning.  Pro-calcitonin was negative at 0.09 and 

current labs reviewed and within normal limits.  WBC is 7.3 and patient is 

afebrile.  Patient denies chest pain or worsening shortness of breath.  Patient 

is having some generalized pain and he chronically takes morphine at home.  Home

medications have been resumed.  Patient reports tolerating diet with no reports 

of nausea or vomiting noted.





Review of systems:


Constitutional: No reports of fatigue, fever, or chills


Cardiovascular: No reports of chest pain or palpitations


Respiratory: No reports of shortness of breath or cough


GI: No reports of nausea, vomiting, or diarrhea


: No reports of dysuria or retention


Neurovascular: No reports of weakness or numbness





All medications have been reviewed





Active Medications





Acetaminophen (Acetaminophen Tab 325 Mg Tab)  650 mg PO Q4HR PRN


   PRN Reason: Mild Pain or Fever > 100.5


Albuterol/Ipratropium (Ipratropium-Albuterol 3 Ml Neb)  3 ml INHALATION RT-QID 

[facility]


   Last Admin: 10/14/22 15:25 Dose:  3 ml


   


Dextrose/Water (Dextrose 50% Syringe 50 Ml)  25 ml IVP PER PROTOCOL PRN; 

Protocol


   PRN Reason: Hypoglycemia


Dextrose/Water (Dextrose 50% Syringe 50 Ml)  50 ml IVP PER PROTOCOL PRN; 

Protocol


   PRN Reason: Hypoglycemia


Diazepam (Diazepam 5 Mg Tab)  5 mg PO DAILY PRN


   PRN Reason: prior to radiation


   Last Admin: 10/13/22 01:30 Dose:  5 mg


   


Docusate Sodium (Docusate 100 Mg Cap)  100 mg PO BID [facility]


   Last Admin: 10/14/22 08:48 Dose:  100 mg


   


Enoxaparin Sodium (Enoxaparin 40 Mg/0.4 Ml Syringe)  40 mg SQ DAILY [facility]


   Last Admin: 10/14/22 08:48 Dose:  40 mg


   


Furosemide (Furosemide 40 Mg Tab)  40 mg PO BID [facility]


   Last Admin: 10/14/22 14:11 Dose:  40 mg


   


Hydromorphone HCl (Hydromorphone 1 Mg/Ml 1 Ml Syringe)  1 mg IVP Q4HR PRN


   PRN Reason: Pain


   Last Admin: 10/13/22 14:32 Dose:  1 mg


   


Vancomycin HCl 2,000 mg/ (Sodium Chloride)  500 mls @ 167 mls/hr IVPB Q12H [facility]


   Last Admin: 10/14/22 08:51 Dose:  167 mls/hr


   


Ibuprofen (Ibuprofen 400 Mg Tab)  400 mg PO Q6H PRN


   PRN Reason: Pain


Insulin Aspart (Insulin Aspart (Novolog) 100 Unit/Ml Vial)  0 unit SQ ACHS [facility]; 

Protocol


Loratadine (Loratadine 10 Mg Tab)  10 mg PO DAILY [facility]


   Last Admin: 10/14/22 08:49 Dose:  10 mg


   


Methylprednisolone Sodium Succinate (Methylprednisolone Sod Succi 125 Mg/2 Ml 

Vial)  60 mg IV Q6HR [facility]


   Last Admin: 10/14/22 12:31 Dose:  60 mg


   


Miscellaneous Information (Vancomycin Trough Due 1 Each Misc)  0 each MISCELLANE

AS DIRECTED ONE


   Stop: 10/16/22 09:01


Morphine Sulfate (Morphine Sulfate Er 30 Mg Tablet)  30 mg PO Q12HR [facility]; 

Protocol


   Last Admin: 10/14/22 08:48 Dose:  30 mg


   


Naloxone HCl (Naloxone 0.4 Mg/Ml 1 Ml Vial)  0.2 mg IVP Q2M PRN


   PRN Reason: Opioid Reversal


Ondansetron HCl (Ondansetron Odt 8 Mg Tab.Rapdis)  8 mg PO TID PRN


   PRN Reason: Nausea And Vomiting


Pregabalin (Pregabalin 100 Mg Cap)  100 mg PO BID [facility]


   Last Admin: 10/14/22 08:49 Dose:  100 mg


   





Physical exam:





Gen: This is a 61-year-old male was awake, alert and oriented 3, well-

developed, well-nourished, ill-appearing, obese


HEENT: Head is atraumatic, normocephalic. Pupils equal, round. Sclerae is 

anicteric. 


NECK: Supple. No JVD. No lymphadenopathy. No thyromegaly. 


LUNGS: Diminished breath sounds bilaterally with some scattered crackles noted 

at the bases.  Some minimal expiratory wheezing noted.  No intercostal 

retractions.


HEART: Regular rate and rhythm. No murmur. 


ABDOMEN: Soft. Bowel sounds are present. No masses.  No tenderness.


EXTREMITIES: No pedal edema.  No calf tenderness.  Bilateral lower extremity 

chronic skin sloughing and vascular disease 


NEUROLOGICAL: Patient is awake, alert and oriented x3. Cranial nerves 2 through 

12 are grossly intact. 








Assessment:





Acute left upper chest pain with redness over the anterior chest and swelling 

and cellulitis.


Chest pain.  Ruled out ACS.


Squamous cell carcinoma of the lung with progression as per chest x-ray 

findings.  Patient is status post chemo/radiation and currently on immunotherapy

every 2 weeks.


Hypovolemic hyponatremia, Improved 


Malignancy related pain


Anemia of chronic disease


COPD/asthma with mild exacerbation.  On oxygen at 2 L via nasal cannula.


Prior history of smoking


Hypertension


Diabetes type 2 non-insulin-dependent


DVT prophylaxis with Lovenox subcu.





Plan:





Patient will be continued on oxygen supplementation via nasal cannula. Currently

maintained on humidified 6L via NC and discussed with patient, family, and 

nursing staff about weaning as tolerated. Patient has 2L via NC chronically.  

Continue with IV steroids and duo nebs and also maintained on antibiotics in the

form of vancomycin for left upper chest cellulitis. Markedly improved swelling 

and redness.  Appreciate oncology recommendations as they recommend continuing 

with IV abx for at least 24 more hours and possibly switch to oral.


Continue with insulin sliding scale and other home medications.


Continued pain management, bowel regimen and follow-up closely.  Prognosis poor 

at this time. Family to continue with palliative and are discussing further 

about code status and hospice, but not at this time. Possibly seeking second 

opinion outpatient and will need further imaging outpatient. Will increase lasix

to 40mg bid PO and follow up labs in the am. 


Patient is eager to be discharged. Prognosis remains guarded. Possible discharge

in 24-48 hours with oncological recommendations. 





The impression and plan of care has been dictated by Bibi Lynch, Nurse 

Practitioner as directed.





Dr. Maegan MD


I have performed a history and examination and MDM of this patient, discussed 

the same with the dictator, and  agree with the dictator's assessment and plan 

as written ,documented as a scribe. Based on total visit time,  I have performed

more than 50% of the visit.  





Objective





- Vital Signs


Vital signs: 


                                   Vital Signs











Temp  98.5 F   10/14/22 04:06


 


Pulse  103 H  10/14/22 08:44


 


Resp  16   10/14/22 04:06


 


BP  136/77   10/14/22 04:06


 


Pulse Ox  95   10/14/22 07:20


 


FiO2      








                                 Intake & Output











 10/13/22 10/14/22 10/14/22





 18:59 06:59 18:59


 


Output Total  800 


 


Balance  -800 


 


Weight 114.759 kg 110 kg 


 


Output:   


 


  Urine  800 


 


Other:   


 


  Voiding Method  External Catheter Urinal














- Labs


CBC & Chem 7: 


                                 10/14/22 04:45





                                 10/14/22 04:45


Labs: 


                  Abnormal Lab Results - Last 24 Hours (Table)











  10/13/22 10/14/22 10/14/22 Range/Units





  20:29 04:45 04:45 


 


RBC   3.70 L   (4.30-5.90)  m/uL


 


Hgb   9.8 L   (13.0-17.5)  gm/dL


 


Hct   33.6 L   (39.0-53.0)  %


 


MCHC   29.2 L   (31.0-37.0)  g/dL


 


RDW   15.8 H   (11.5-15.5)  %


 


Plt Count   614 H   (150-450)  k/uL


 


Lymphocytes #   0.5 L   (1.0-4.8)  k/uL


 


Carbon Dioxide    30.9 H  (20.0-27.5)  mmol/L


 


Anion Gap    8.10 L  (10.00-18.00)  mmol/L


 


BUN/Creatinine Ratio    28.17 H  (12.00-20.00)  Ratio


 


Glucose    149 H  ()  mg/dL


 


POC Glucose (mg/dL)  201 H    ()  mg/dL














  10/14/22 Range/Units





  07:21 


 


RBC   (4.30-5.90)  m/uL


 


Hgb   (13.0-17.5)  gm/dL


 


Hct   (39.0-53.0)  %


 


MCHC   (31.0-37.0)  g/dL


 


RDW   (11.5-15.5)  %


 


Plt Count   (150-450)  k/uL


 


Lymphocytes #   (1.0-4.8)  k/uL


 


Carbon Dioxide   (20.0-27.5)  mmol/L


 


Anion Gap   (10.00-18.00)  mmol/L


 


BUN/Creatinine Ratio   (12.00-20.00)  Ratio


 


Glucose   ()  mg/dL


 


POC Glucose (mg/dL)  163 H  ()  mg/dL

## 2022-10-14 NOTE — P.PN
Subjective


Progress Note Date: 10/14/22


Principal diagnosis: 





left chest cellulitis, COPD exacerbation





In f/u today pt reports breathing is more comfortable, his chest and arm redness

and swelling are much improved.  No fevers, he feels good today. 





Objective





- Vital Signs


Vital signs: 


                                   Vital Signs











Temp  97.5 F L  10/14/22 11:42


 


Pulse  92   10/14/22 15:41


 


Resp  22   10/14/22 11:42


 


BP  145/69   10/14/22 11:42


 


Pulse Ox  93 L  10/14/22 11:42


 


FiO2      








                                 Intake & Output











 10/13/22 10/14/22 10/14/22





 18:59 06:59 18:59


 


Intake Total   500


 


Output Total  800 


 


Balance  -800 500


 


Weight 114.759 kg 110 kg 


 


Intake:   


 


  Intake, IV Titration   500





  Amount   


 


    Vancomycin 2,000 mg In   500





    Sodium Chloride 0.9% 500   





    ml 500 ml @ 167 mls/hr   





    IVPB Q12H [facility] Rx#:   





    [id number]   


 


Output:   


 


  Urine  800 


 


Other:   


 


  Voiding Method  External Catheter Urinal


 


  # Voids   2


 


  # Bowel Movements   2














- Constitutional


General appearance: Present: cooperative, mild distress, obese





- EENT


Eyes: Present: anicteric sclerae, EOMI


ENT: Present: hearing grossly normal





- Respiratory


Details: 





mildly labored, O2 6L


Respiratory: bilateral: diminished





- Integumentary


Integumentary Comment(s): 





Left chest and upper arm redness is improved, inferior to lt breast the skin is 

hard, swelling in the LUE is improved, swelling mainly in lower arm and hand.





- Neurologic


Neurologic: Present: CNII-XII intact





- Musculoskeletal


Musculoskeletal: Present: generalized weakness





- Psychiatric


Psychiatric: Present: A&O x's 3, appropriate affect, intact judgment & insight





- Labs


CBC & Chem 7: 


                                 10/14/22 04:45





                                 10/14/22 04:45


Labs: 


                  Abnormal Lab Results - Last 24 Hours (Table)











  10/13/22 10/14/22 10/14/22 Range/Units





  20:29 04:45 04:45 


 


RBC   3.70 L   (4.30-5.90)  m/uL


 


Hgb   9.8 L   (13.0-17.5)  gm/dL


 


Hct   33.6 L   (39.0-53.0)  %


 


MCHC   29.2 L   (31.0-37.0)  g/dL


 


RDW   15.8 H   (11.5-15.5)  %


 


Plt Count   614 H   (150-450)  k/uL


 


Lymphocytes #   0.5 L   (1.0-4.8)  k/uL


 


Carbon Dioxide    30.9 H  (20.0-27.5)  mmol/L


 


Anion Gap    8.10 L  (10.00-18.00)  mmol/L


 


BUN/Creatinine Ratio    28.17 H  (12.00-20.00)  Ratio


 


Glucose    149 H  ()  mg/dL


 


POC Glucose (mg/dL)  201 H    ()  mg/dL














  10/14/22 10/14/22 10/14/22 Range/Units





  07:21 11:31 16:55 


 


RBC     (4.30-5.90)  m/uL


 


Hgb     (13.0-17.5)  gm/dL


 


Hct     (39.0-53.0)  %


 


MCHC     (31.0-37.0)  g/dL


 


RDW     (11.5-15.5)  %


 


Plt Count     (150-450)  k/uL


 


Lymphocytes #     (1.0-4.8)  k/uL


 


Carbon Dioxide     (20.0-27.5)  mmol/L


 


Anion Gap     (10.00-18.00)  mmol/L


 


BUN/Creatinine Ratio     (12.00-20.00)  Ratio


 


Glucose     ()  mg/dL


 


POC Glucose (mg/dL)  163 H  201 H  196 H  ()  mg/dL








                      Microbiology - Last 24 Hours (Table)











 10/13/22 13:00 Blood Culture - Preliminary





 Blood    No Growth after 24 hours


 


 10/13/22 12:45 Blood Culture - Preliminary





 Blood    No Growth after 24 hours














Assessment and Plan


(1) Cellulitis


Current Visit: Yes   Status: Acute   Priority: High   Code(s): L03.90 - 

CELLULITIS, UNSPECIFIED   SNOMED Code(s): 488377039


   





(2) COPD with exacerbation


Current Visit: Yes   Status: Acute   Priority: High   Code(s): J44.1 - CHRONIC 

OBSTRUCTIVE PULMONARY DISEASE W (ACUTE) EXACERBATION   SNOMED Code(s): 326790211


   





(3) Squamous cell lung cancer


Current Visit: Yes   Status: Chronic   Priority: High   Code(s): C34.90 - 

MALIGNANT NEOPLASM OF UNSP PART OF UNSP BRONCHUS OR LUNG   SNOMED Code(s): 

121836812


   


Plan: 


Lt chest and upper extremity redness and pain improved with abx. Cont IV abx for

now.  Will transition to oral in the next day or 2.  


There are concerns that there is malignant involvement of the skin in that are. 

Pt and wife are aware of this.  Pt does have a f/u treatment CT scheduled for 

Mon.  Would want any infection cleared before proceeding with this.  May 

consider moving that scan appt.  Will see how pt does.  F/U with MD after scan 

for further recommendations.


Pt and wife have palliative care already set up, they have questions about 

services.  Will have Palliative Care f/u with them at [place].    





 Attests:  I have seen and examined pt, performed H&P, developed impression 

and plan of care.  Discussed with dictator.  Agree with dictation, documented as

a scribe.

## 2022-10-15 LAB
GLUCOSE BLD-MCNC: 162 MG/DL (ref 70–110)
GLUCOSE BLD-MCNC: 215 MG/DL (ref 70–110)
GLUCOSE BLD-MCNC: 225 MG/DL (ref 70–110)
GLUCOSE BLD-MCNC: 267 MG/DL (ref 70–110)
GLUCOSE BLD-MCNC: 273 MG/DL (ref 70–110)

## 2022-10-15 RX ADMIN — CEPHALEXIN SCH MG: 500 CAPSULE ORAL at 21:34

## 2022-10-15 RX ADMIN — PREGABALIN SCH MG: 100 CAPSULE ORAL at 21:35

## 2022-10-15 RX ADMIN — MORPHINE SULFATE SCH MG: 30 TABLET, FILM COATED, EXTENDED RELEASE ORAL at 21:33

## 2022-10-15 RX ADMIN — LORATADINE SCH MG: 10 TABLET ORAL at 08:17

## 2022-10-15 RX ADMIN — IPRATROPIUM BROMIDE AND ALBUTEROL SULFATE SCH ML: .5; 3 SOLUTION RESPIRATORY (INHALATION) at 16:18

## 2022-10-15 RX ADMIN — METHYLPREDNISOLONE SODIUM SUCCINATE SCH MG: 125 INJECTION, POWDER, FOR SOLUTION INTRAMUSCULAR; INTRAVENOUS at 05:54

## 2022-10-15 RX ADMIN — IPRATROPIUM BROMIDE AND ALBUTEROL SULFATE SCH ML: .5; 3 SOLUTION RESPIRATORY (INHALATION) at 07:16

## 2022-10-15 RX ADMIN — METHYLPREDNISOLONE SODIUM SUCCINATE SCH MG: 125 INJECTION, POWDER, FOR SOLUTION INTRAMUSCULAR; INTRAVENOUS at 00:30

## 2022-10-15 RX ADMIN — SODIUM CHLORIDE SCH MLS/HR: 9 INJECTION, SOLUTION INTRAVENOUS at 09:26

## 2022-10-15 RX ADMIN — IPRATROPIUM BROMIDE AND ALBUTEROL SULFATE SCH ML: .5; 3 SOLUTION RESPIRATORY (INHALATION) at 11:02

## 2022-10-15 RX ADMIN — INSULIN ASPART SCH UNIT: 100 INJECTION, SOLUTION INTRAVENOUS; SUBCUTANEOUS at 18:10

## 2022-10-15 RX ADMIN — FUROSEMIDE SCH MG: 40 TABLET ORAL at 08:18

## 2022-10-15 RX ADMIN — MORPHINE SULFATE SCH MG: 30 TABLET, FILM COATED, EXTENDED RELEASE ORAL at 08:17

## 2022-10-15 RX ADMIN — CEPHALEXIN SCH MG: 500 CAPSULE ORAL at 15:41

## 2022-10-15 RX ADMIN — IPRATROPIUM BROMIDE AND ALBUTEROL SULFATE SCH ML: .5; 3 SOLUTION RESPIRATORY (INHALATION) at 20:16

## 2022-10-15 RX ADMIN — INSULIN ASPART SCH UNIT: 100 INJECTION, SOLUTION INTRAVENOUS; SUBCUTANEOUS at 12:50

## 2022-10-15 RX ADMIN — DOCUSATE SODIUM SCH MG: 100 CAPSULE, LIQUID FILLED ORAL at 21:34

## 2022-10-15 RX ADMIN — INSULIN ASPART SCH UNIT: 100 INJECTION, SOLUTION INTRAVENOUS; SUBCUTANEOUS at 21:34

## 2022-10-15 RX ADMIN — ENOXAPARIN SODIUM SCH MG: 40 INJECTION SUBCUTANEOUS at 08:16

## 2022-10-15 RX ADMIN — INSULIN ASPART SCH UNIT: 100 INJECTION, SOLUTION INTRAVENOUS; SUBCUTANEOUS at 08:16

## 2022-10-15 RX ADMIN — PREGABALIN SCH MG: 100 CAPSULE ORAL at 08:17

## 2022-10-15 RX ADMIN — METHYLPREDNISOLONE SODIUM SUCCINATE SCH MG: 125 INJECTION, POWDER, FOR SOLUTION INTRAMUSCULAR; INTRAVENOUS at 12:50

## 2022-10-15 RX ADMIN — DOCUSATE SODIUM SCH MG: 100 CAPSULE, LIQUID FILLED ORAL at 08:18

## 2022-10-16 VITALS — RESPIRATION RATE: 20 BRPM

## 2022-10-16 LAB
ANION GAP SERPL CALC-SCNC: 8 MMOL/L (ref 10–18)
BASOPHILS # BLD AUTO: 0 X 10*3/UL (ref 0–0.1)
BASOPHILS NFR BLD AUTO: 0 %
BUN SERPL-SCNC: 19.7 MG/DL (ref 9–27)
BUN/CREAT SERPL: 32.83 RATIO (ref 12–20)
CALCIUM SPEC-MCNC: 8.8 MG/DL (ref 8.7–10.3)
CHLORIDE SERPL-SCNC: 94 MMOL/L (ref 96–109)
CO2 SERPL-SCNC: 36 MMOL/L (ref 20–27.5)
EOSINOPHIL # BLD AUTO: 0.03 X 10*3/UL (ref 0.04–0.35)
EOSINOPHIL NFR BLD AUTO: 0.3 %
ERYTHROCYTE [DISTWIDTH] IN BLOOD BY AUTOMATED COUNT: 3.82 X 10*6/UL (ref 4.4–5.6)
ERYTHROCYTE [DISTWIDTH] IN BLOOD: 15.9 % (ref 11.5–14.5)
GLUCOSE BLD-MCNC: 109 MG/DL (ref 70–110)
GLUCOSE BLD-MCNC: 124 MG/DL (ref 70–110)
GLUCOSE BLD-MCNC: 170 MG/DL (ref 70–110)
GLUCOSE BLD-MCNC: 200 MG/DL (ref 70–110)
GLUCOSE SERPL-MCNC: 173 MG/DL (ref 70–110)
HCT VFR BLD AUTO: 34 % (ref 39.6–50)
HGB BLD-MCNC: 9.9 G/DL (ref 13–17)
IMM GRANULOCYTES BLD QL AUTO: 0.4 %
LYMPHOCYTES # SPEC AUTO: 0.92 X 10*3/UL (ref 0.9–5)
LYMPHOCYTES NFR SPEC AUTO: 8 %
MCH RBC QN AUTO: 25.9 PG (ref 27–32)
MCHC RBC AUTO-ENTMCNC: 29.1 G/DL (ref 32–37)
MCV RBC AUTO: 89 FL (ref 80–97)
MONOCYTES # BLD AUTO: 0.95 X 10*3/UL (ref 0.2–1)
MONOCYTES NFR BLD AUTO: 8.3 %
NEUTROPHILS # BLD AUTO: 9.56 X 10*3/UL (ref 1.8–7.7)
NEUTROPHILS NFR BLD AUTO: 83 %
NRBC BLD AUTO-RTO: 0 /100 WBCS (ref 0–0)
PLATELET # BLD AUTO: 513 X 10*3/UL (ref 140–440)
POTASSIUM SERPL-SCNC: 3.9 MMOL/L (ref 3.5–5.5)
SODIUM SERPL-SCNC: 138 MMOL/L (ref 135–145)
WBC # BLD AUTO: 11.51 X 10*3/UL (ref 4.5–10)

## 2022-10-16 RX ADMIN — ACETAMINOPHEN PRN MG: 325 TABLET, FILM COATED ORAL at 15:51

## 2022-10-16 RX ADMIN — DOCUSATE SODIUM SCH MG: 100 CAPSULE, LIQUID FILLED ORAL at 08:47

## 2022-10-16 RX ADMIN — HYDROMORPHONE HYDROCHLORIDE PRN MG: 1 INJECTION, SOLUTION INTRAMUSCULAR; INTRAVENOUS; SUBCUTANEOUS at 10:32

## 2022-10-16 RX ADMIN — INSULIN ASPART SCH UNIT: 100 INJECTION, SOLUTION INTRAVENOUS; SUBCUTANEOUS at 17:57

## 2022-10-16 RX ADMIN — LORATADINE SCH MG: 10 TABLET ORAL at 08:47

## 2022-10-16 RX ADMIN — IPRATROPIUM BROMIDE AND ALBUTEROL SULFATE SCH ML: .5; 3 SOLUTION RESPIRATORY (INHALATION) at 09:07

## 2022-10-16 RX ADMIN — HYDROMORPHONE HYDROCHLORIDE PRN MG: 1 INJECTION, SOLUTION INTRAMUSCULAR; INTRAVENOUS; SUBCUTANEOUS at 01:32

## 2022-10-16 RX ADMIN — MORPHINE SULFATE SCH MG: 30 TABLET, FILM COATED, EXTENDED RELEASE ORAL at 08:47

## 2022-10-16 RX ADMIN — ENOXAPARIN SODIUM SCH MG: 40 INJECTION SUBCUTANEOUS at 08:47

## 2022-10-16 RX ADMIN — MORPHINE SULFATE SCH MG: 30 TABLET, FILM COATED, EXTENDED RELEASE ORAL at 20:57

## 2022-10-16 RX ADMIN — CEPHALEXIN SCH MG: 500 CAPSULE ORAL at 08:47

## 2022-10-16 RX ADMIN — PREGABALIN SCH MG: 100 CAPSULE ORAL at 20:57

## 2022-10-16 RX ADMIN — IPRATROPIUM BROMIDE AND ALBUTEROL SULFATE SCH: .5; 3 SOLUTION RESPIRATORY (INHALATION) at 11:40

## 2022-10-16 RX ADMIN — INSULIN ASPART SCH UNIT: 100 INJECTION, SOLUTION INTRAVENOUS; SUBCUTANEOUS at 08:47

## 2022-10-16 RX ADMIN — INSULIN ASPART SCH: 100 INJECTION, SOLUTION INTRAVENOUS; SUBCUTANEOUS at 12:16

## 2022-10-16 RX ADMIN — ACETAMINOPHEN PRN MG: 325 TABLET, FILM COATED ORAL at 20:58

## 2022-10-16 RX ADMIN — DOCUSATE SODIUM SCH MG: 100 CAPSULE, LIQUID FILLED ORAL at 20:57

## 2022-10-16 RX ADMIN — IPRATROPIUM BROMIDE AND ALBUTEROL SULFATE SCH ML: .5; 3 SOLUTION RESPIRATORY (INHALATION) at 16:28

## 2022-10-16 RX ADMIN — HYDROMORPHONE HYDROCHLORIDE PRN MG: 1 INJECTION, SOLUTION INTRAMUSCULAR; INTRAVENOUS; SUBCUTANEOUS at 06:18

## 2022-10-16 RX ADMIN — CEPHALEXIN SCH MG: 500 CAPSULE ORAL at 22:33

## 2022-10-16 RX ADMIN — CEPHALEXIN SCH MG: 500 CAPSULE ORAL at 15:51

## 2022-10-16 RX ADMIN — PREGABALIN SCH MG: 100 CAPSULE ORAL at 08:48

## 2022-10-16 RX ADMIN — INSULIN ASPART SCH: 100 INJECTION, SOLUTION INTRAVENOUS; SUBCUTANEOUS at 21:03

## 2022-10-16 RX ADMIN — IPRATROPIUM BROMIDE AND ALBUTEROL SULFATE SCH ML: .5; 3 SOLUTION RESPIRATORY (INHALATION) at 20:39

## 2022-10-16 RX ADMIN — FUROSEMIDE SCH MG: 20 TABLET ORAL at 08:47

## 2022-10-17 ENCOUNTER — HOSPITAL ENCOUNTER (OUTPATIENT)
Dept: HOSPITAL 47 - RADCTMAIN | Age: 62
Discharge: HOME | End: 2022-10-17
Attending: INTERNAL MEDICINE
Payer: MEDICARE

## 2022-10-17 VITALS — SYSTOLIC BLOOD PRESSURE: 161 MMHG | DIASTOLIC BLOOD PRESSURE: 92 MMHG | TEMPERATURE: 97 F

## 2022-10-17 VITALS — HEART RATE: 95 BPM

## 2022-10-17 DIAGNOSIS — C34.12: Primary | ICD-10-CM

## 2022-10-17 LAB
ANION GAP SERPL CALC-SCNC: 8 MMOL/L (ref 10–18)
BASOPHILS # BLD AUTO: 0 X 10*3/UL (ref 0–0.1)
BASOPHILS NFR BLD AUTO: 0 %
BUN SERPL-SCNC: 19.1 MG/DL (ref 9–27)
BUN/CREAT SERPL: 31.83 RATIO (ref 12–20)
CALCIUM SPEC-MCNC: 8.8 MG/DL (ref 8.7–10.3)
CHLORIDE SERPL-SCNC: 95 MMOL/L (ref 96–109)
CO2 SERPL-SCNC: 37 MMOL/L (ref 20–27.5)
EOSINOPHIL # BLD AUTO: 0 X 10*3/UL (ref 0.04–0.35)
EOSINOPHIL NFR BLD AUTO: 0 %
ERYTHROCYTE [DISTWIDTH] IN BLOOD BY AUTOMATED COUNT: 4.17 X 10*6/UL (ref 4.4–5.6)
ERYTHROCYTE [DISTWIDTH] IN BLOOD: 15.9 % (ref 11.5–14.5)
GLUCOSE BLD-MCNC: 144 MG/DL (ref 70–110)
GLUCOSE SERPL-MCNC: 148 MG/DL (ref 70–110)
HCT VFR BLD AUTO: 37.6 % (ref 39.6–50)
HGB BLD-MCNC: 10.7 G/DL (ref 13–17)
IMM GRANULOCYTES BLD QL AUTO: 0.4 %
LYMPHOCYTES # SPEC AUTO: 0.74 X 10*3/UL (ref 0.9–5)
LYMPHOCYTES NFR SPEC AUTO: 7.7 %
MCH RBC QN AUTO: 25.7 PG (ref 27–32)
MCHC RBC AUTO-ENTMCNC: 28.5 G/DL (ref 32–37)
MCV RBC AUTO: 90.2 FL (ref 80–97)
MONOCYTES # BLD AUTO: 0.67 X 10*3/UL (ref 0.2–1)
MONOCYTES NFR BLD AUTO: 7 %
NEUTROPHILS # BLD AUTO: 8.11 X 10*3/UL (ref 1.8–7.7)
NEUTROPHILS NFR BLD AUTO: 84.9 %
NRBC BLD AUTO-RTO: 0 /100 WBCS (ref 0–0)
PLATELET # BLD AUTO: 529 X 10*3/UL (ref 140–440)
POTASSIUM SERPL-SCNC: 4.1 MMOL/L (ref 3.5–5.5)
SODIUM SERPL-SCNC: 140 MMOL/L (ref 135–145)
WBC # BLD AUTO: 9.56 X 10*3/UL (ref 4.5–10)

## 2022-10-17 PROCEDURE — 71260 CT THORAX DX C+: CPT

## 2022-10-17 RX ADMIN — INSULIN ASPART SCH: 100 INJECTION, SOLUTION INTRAVENOUS; SUBCUTANEOUS at 07:18

## 2022-10-17 RX ADMIN — ENOXAPARIN SODIUM SCH MG: 40 INJECTION SUBCUTANEOUS at 07:52

## 2022-10-17 RX ADMIN — CEPHALEXIN SCH MG: 500 CAPSULE ORAL at 07:51

## 2022-10-17 RX ADMIN — IPRATROPIUM BROMIDE AND ALBUTEROL SULFATE SCH ML: .5; 3 SOLUTION RESPIRATORY (INHALATION) at 11:42

## 2022-10-17 RX ADMIN — IPRATROPIUM BROMIDE AND ALBUTEROL SULFATE SCH ML: .5; 3 SOLUTION RESPIRATORY (INHALATION) at 07:56

## 2022-10-17 RX ADMIN — FUROSEMIDE SCH MG: 20 TABLET ORAL at 07:51

## 2022-10-17 RX ADMIN — PREGABALIN SCH MG: 100 CAPSULE ORAL at 07:50

## 2022-10-17 RX ADMIN — MORPHINE SULFATE SCH MG: 30 TABLET, FILM COATED, EXTENDED RELEASE ORAL at 07:50

## 2022-10-17 RX ADMIN — DOCUSATE SODIUM SCH MG: 100 CAPSULE, LIQUID FILLED ORAL at 07:51

## 2022-10-17 RX ADMIN — LORATADINE SCH MG: 10 TABLET ORAL at 07:51

## 2022-10-17 RX ADMIN — ACETAMINOPHEN PRN MG: 325 TABLET, FILM COATED ORAL at 04:50

## 2022-10-17 NOTE — CT
EXAMINATION TYPE: CT chest w con

 

DATE OF EXAM: 10/17/2022

 

COMPARISON: CT chest 8/1/2022

 

HISTORY: MALIGNANT NEOPLASM OF UPPER LOBE, LEFT BRON

 

CT DLP: 631.70 mGycm.   Automated Exposure Control for Dose Reduction was Utilized.

 

TECHNIQUE:  CT scan of the thorax is performed following with IV Contrast, patient injected with 64ml
 mL of Isovue 300.  

 

FINDINGS:

 

LUNGS: Mild to moderate underlying emphysematous change greatest in the upper lobes is redemonstrated
. There is persistent peripheral left upper lung mass and/or masslike consolidation destroying portio
ns of the anterior upper ribs measuring approximately 8.9 x 4.8 cm axial image 13 similar in size to 
prior study. Some left-sided volume loss with mediastinal shift in the upper lung is redemonstrated. 
Scattered areas of nodules and/or nodular consolidation bilaterally are more numerous and larger in s
ize versus most recent prior CT consistent with neoplastic progression or worsening. New small to tin
y right pleural effusion. There is an enlarging 4.9 x 2.8 cm right lateral lower pleural based mass a
xial image 64 also noted.

 

MEDIASTINUM: Calcification a level of the aortic and mitral valve is redemonstrated. Coronary artery 
calcification. No cardiomegaly or pericardial effusion.

 

OTHER: Dependent small gallstones and/or gallbladder sludge ball present. Bilateral gynecomastia is n
oted.

 

IMPRESSION: Continued high stage neoplastic progression as detailed above.

## 2022-10-17 NOTE — P.PN
Subjective


Progress Note Date: 10/16/22





Patient is a 61-year-old male with a known history of squamous cell carcinoma of

the lung with metastasis and is on immunotherapy, hypertension, diabetes type 2,

COPD and previous history of smoking presents to ER with complaints of left-

sided chest pain.  Patient was recently sharp left-sided chest pain.  EMS gave 

nitro which seemed to improve symptoms.  Patient also complains of redness of 

the left upper chest.  No fever no chills.  Does have cough without any sputum 

production.  Patient is also complaining of severe pain.


No complaints of nausea vomiting abdominal pain or diarrhea.  Denied any dysuria

or hematuria.  On admission patient was requiring 3 Choloxin with nasal cannula.

 Tachycardic with heart rate around 104.


Chest x-ray showed bilateral pulmonary infiltrates show significantly increased 

compared to old exam.  There is increasing pleural thickening in the left 

lateral lung and left lung apex compared to old exam.  This is consistent with 

progression of malignancy.  This could be mesothelioma.


EKG showed supraventricular rhythm.


Laboratory data showed WBC 6.3 hemoglobin 9.1 and platelets 517


Sodium 136 potassium 4.6 chloride 97 bicarb is 31 BUN 19 and creatinine 0.6 

blood sugar is 104 calcium 8.2 magnesium 2.1


Ammonia 18 troponin 0.024, proBNP 1640.  Albumin 2.7.





10/14/2022





Patient is seen and evaluated in follow-up with oncology following.  Patient is 

currently maintained on 6 L normally wears 2 L at home and oxygen saturation is 

93%.  Encouraged weaning as tolerated and patient was maintained on Lasix 20 mg 

daily although will increase to 40 mg twice daily.  Patient was also started on 

IV vancomycin by oncology for possible left chest cellulitis and swelling has 

significantly improved.  Left upper extremity also improved most likely appears 

to be swollen from lymphedema.  Patient currently enrolled in palliative care 

and discussion was had about possible hospice although patient is not willing to

proceed with hospice at this time and would like a second opinion at [facility].  Patient reports his swelling is improved and asking when he can go

home.  Oncology recommending staying for another day at least on IV antibiotics 

and reevaluation in the morning.  Pro-calcitonin was negative at 0.09 and 

current labs reviewed and within normal limits.  WBC is 7.3 and patient is 

afebrile.  Patient denies chest pain or worsening shortness of breath.  Patient 

is having some generalized pain and he chronically takes morphine at home.  Home

medications have been resumed.  Patient reports tolerating diet with no reports 

of nausea or vomiting noted.





10/15/2022


Patient is currently sitting in his bed comfortably.  Awake alert and oriented 

x3.  No complaints of pruritus.  No nausea vomiting abdominal pain.  Breathing 

status is much improved.


Anterior chest wall redness and swelling is much improved and antibiotics will 

be changed to Keflex.  Vancomycin will be discontinued.


Patient is also will be started on prednisone.  Currently on pain management.


Laboratory data reviewed.  Anticipate discharge in next 24 to 48 hours.





10/16/2022


Patient is currently sitting in the chair.  Awake alert and oriented x3.  No 

complaints of chest pain or worsening shortness of breath.  Requiring 5 L oxygen

via nasal cannula and is being titrated down to 2 L as per his home regimen.


: Prednisone and also antibiotics in the form of Keflex.  Patient is also on 

Lasix 20 mg daily.


Laboratory data showed WBC 11.4 hemoglobin 9.9 platelets 513


BUN 19.7 creatinine 0.6.  Blood sugar is 170 this morning.


Anticipate discharge in the next 24 hours and titrate down oxygen to 2 L.








Review of systems:


Constitutional: No reports of fatigue, fever, or chills


Cardiovascular: No reports of chest pain or palpitations


Respiratory: No reports of shortness of breath or cough


GI: No reports of nausea, vomiting, or diarrhea


: No reports of dysuria or retention


Neurovascular: No reports of weakness or numbness





All medications have been reviewed





Active Medications





Acetaminophen (Acetaminophen Tab 325 Mg Tab)  650 mg PO Q4HR PRN


   PRN Reason: Mild Pain or Fever > 100.5


Albuterol/Ipratropium (Ipratropium-Albuterol 3 Ml Neb)  3 ml INHALATION RT-QID 

[facility]


   Last Admin: 10/14/22 15:25 Dose:  3 ml


   


Dextrose/Water (Dextrose 50% Syringe 50 Ml)  25 ml IVP PER PROTOCOL PRN; 

Protocol


   PRN Reason: Hypoglycemia


Dextrose/Water (Dextrose 50% Syringe 50 Ml)  50 ml IVP PER PROTOCOL PRN; 

Protocol


   PRN Reason: Hypoglycemia


Diazepam (Diazepam 5 Mg Tab)  5 mg PO DAILY PRN


   PRN Reason: prior to radiation


   Last Admin: 10/13/22 01:30 Dose:  5 mg


   


Docusate Sodium (Docusate 100 Mg Cap)  100 mg PO BID [facility]


   Last Admin: 10/14/22 08:48 Dose:  100 mg


   


Enoxaparin Sodium (Enoxaparin 40 Mg/0.4 Ml Syringe)  40 mg SQ DAILY [facility]


   Last Admin: 10/14/22 08:48 Dose:  40 mg


   


Furosemide (Furosemide 40 Mg Tab)  40 mg PO BID [facility]


   Last Admin: 10/14/22 14:11 Dose:  40 mg


   


Hydromorphone HCl (Hydromorphone 1 Mg/Ml 1 Ml Syringe)  1 mg IVP Q4HR PRN


   PRN Reason: Pain


   Last Admin: 10/13/22 14:32 Dose:  1 mg


   


Vancomycin HCl 2,000 mg/ (Sodium Chloride)  500 mls @ 167 mls/hr IVPB Q12H [facility]


   Last Admin: 10/14/22 08:51 Dose:  167 mls/hr


   


Ibuprofen (Ibuprofen 400 Mg Tab)  400 mg PO Q6H PRN


   PRN Reason: Pain


Insulin Aspart (Insulin Aspart (Novolog) 100 Unit/Ml Vial)  0 unit SQ ACHS [facility]; 

Protocol


Loratadine (Loratadine 10 Mg Tab)  10 mg PO DAILY [facility]


   Last Admin: 10/14/22 08:49 Dose:  10 mg


   


Methylprednisolone Sodium Succinate (Methylprednisolone Sod Succi 125 Mg/2 Ml 

Vial)  60 mg IV Q6HR [facility]


   Last Admin: 10/14/22 12:31 Dose:  60 mg


   


Miscellaneous Information (Vancomycin Trough Due 1 Each Misc)  0 each MISCELLANE

AS DIRECTED ONE


   Stop: 10/16/22 09:01


Morphine Sulfate (Morphine Sulfate Er 30 Mg Tablet)  30 mg PO Q12HR [facility]; 

Protocol


   Last Admin: 10/14/22 08:48 Dose:  30 mg


   


Naloxone HCl (Naloxone 0.4 Mg/Ml 1 Ml Vial)  0.2 mg IVP Q2M PRN


   PRN Reason: Opioid Reversal


Ondansetron HCl (Ondansetron Odt 8 Mg Tab.Rapdis)  8 mg PO TID PRN


   PRN Reason: Nausea And Vomiting


Pregabalin (Pregabalin 100 Mg Cap)  100 mg PO BID [facility]


   Last Admin: 10/14/22 08:49 Dose:  100 mg


   





Physical exam:





Gen: This is a 61-year-old male was awake, alert and oriented 3, well-

developed, well-nourished, ill-appearing, obese


HEENT: Head is atraumatic, normocephalic. Pupils equal, round. Sclerae is 

anicteric. 


NECK: Supple. No JVD. No lymphadenopathy. No thyromegaly. 


LUNGS: Diminished breath sounds bilaterally with some scattered crackles noted 

at the bases.  Some minimal expiratory wheezing noted.  No intercostal 

retractions.


HEART: Regular rate and rhythm. No murmur. 


ABDOMEN: Soft. Bowel sounds are present. No masses.  No tenderness.


EXTREMITIES: No pedal edema.  No calf tenderness.  Bilateral lower extremity 

chronic skin sloughing and vascular disease 


NEUROLOGICAL: Patient is awake, alert and oriented x3. Cranial nerves 2 through 

12 are grossly intact. 








Assessment:





Acute left upper chest pain with redness over the anterior chest and swelling 

and cellulitis.


Chest pain.  Ruled out ACS.


Squamous cell carcinoma of the lung with progression as per chest x-ray 

findings.  Patient is status post chemo/radiation and currently on immunotherapy

every 2 weeks.


Hypovolemic hyponatremia, Improved 


Malignancy related pain


Anemia of chronic disease


COPD/asthma with mild exacerbation.  On oxygen at 2 L via nasal cannula.


Prior history of smoking


Hypertension


Diabetes type 2 non-insulin-dependent


DVT prophylaxis with Lovenox subcu.





Plan:





Patient will be continued on oxygen supplementation via nasal cannula. Currently

maintained on humidified 6L via NC and discussed with patient, family, and 

nursing staff about weaning as tolerated. Patient has 2L via NC chronically.  

Continue with IV steroids and duo nebs and also maintained on antibiotics in the

form of vancomycin for left upper chest cellulitis. Markedly improved swelling 

and redness.  Appreciate oncology recommendations as they recommend continuing 

with IV abx for at least 24 more hours and possibly switch to oral.


Continue with insulin sliding scale and other home medications.


Continued pain management, bowel regimen and follow-up closely.  Prognosis poor 

at this time. Family to continue with palliative and are discussing further 

about code status and hospice, but not at this time. Possibly seeking second 

opinion outpatient and will need further imaging outpatient. Will increase lasix

to 40mg bid PO and follow up labs in the am. 


Patient is eager to be discharged. Prognosis remains guarded. Possible discharge

in 24-48 hours with oncological recommendations. 








Objective





- Vital Signs


Vital signs: 


                                   Vital Signs











Temp  98.4 F   10/16/22 12:05


 


Pulse  99   10/16/22 16:38


 


Resp  17   10/16/22 12:05


 


BP  112/67   10/16/22 12:05


 


Pulse Ox  96   10/16/22 16:28


 


FiO2      








                                 Intake & Output











 10/15/22 10/16/22 10/16/22





 18:59 06:59 18:59


 


Intake Total 1388 3219 


 


Balance 1388 1080 


 


Weight  107.955 kg 


 


Intake:   


 


  Intake, IV Titration 500  





  Amount   


 


    Vancomycin 2,000 mg In 500  





    Sodium Chloride 0.9% 500   





    ml 500 ml @ 167 mls/hr   





    IVPB Q12H [facility] Rx#:   





    [id number]   


 


  Oral 168 1080 


 


Other:   


 


  Voiding Method Toilet Toilet 





 Urinal Urinal 


 


  # Voids 3 3 














- Labs


CBC & Chem 7: 


                                 10/16/22 06:34





                                 10/16/22 06:34


Labs: 


                  Abnormal Lab Results - Last 24 Hours (Table)











  10/15/22 10/15/22 10/15/22 Range/Units





  18:07 18:16 20:18 


 


WBC     (4.50-10.00)  X 10*3/uL


 


RBC     (4.40-5.60)  X 10*6/uL


 


Hgb     (13.0-17.0)  g/dL


 


Hct     (39.6-50.0)  %


 


MCH     (27.0-32.0)  pg


 


MCHC     (32.0-37.0)  g/dL


 


RDW     (11.5-14.5)  %


 


Plt Count     (140-440)  X 10*3/uL


 


MPV     (9.5-12.2)  fL


 


Immature Gran #     (0.00-0.04)  X 10*3/uL


 


Neutrophils #     (1.80-7.70)  X 10*3/uL


 


Eosinophils #     (0.04-0.35)  X 10*3/uL


 


Chloride     ()  mmol/L


 


Carbon Dioxide     (20.0-27.5)  mmol/L


 


Anion Gap     (10.00-18.00)  mmol/L


 


BUN/Creatinine Ratio     (12.00-20.00)  Ratio


 


Glucose     ()  mg/dL


 


POC Glucose (mg/dL)  267 H  273 H  225 H  ()  mg/dL














  10/16/22 10/16/22 10/16/22 Range/Units





  06:34 06:34 07:23 


 


WBC   11.51 H   (4.50-10.00)  X 10*3/uL


 


RBC   3.82 L   (4.40-5.60)  X 10*6/uL


 


Hgb   9.9 L   (13.0-17.0)  g/dL


 


Hct   34.0 L   (39.6-50.0)  %


 


MCH   25.9 L   (27.0-32.0)  pg


 


MCHC   29.1 L   (32.0-37.0)  g/dL


 


RDW   15.9 H   (11.5-14.5)  %


 


Plt Count   513 H   (140-440)  X 10*3/uL


 


MPV   8.9 L   (9.5-12.2)  fL


 


Immature Gran #   0.05 H   (0.00-0.04)  X 10*3/uL


 


Neutrophils #   9.56 H   (1.80-7.70)  X 10*3/uL


 


Eosinophils #   0.03 L   (0.04-0.35)  X 10*3/uL


 


Chloride  94 L    ()  mmol/L


 


Carbon Dioxide  36.0 H    (20.0-27.5)  mmol/L


 


Anion Gap  8.00 L    (10.00-18.00)  mmol/L


 


BUN/Creatinine Ratio  32.83 H    (12.00-20.00)  Ratio


 


Glucose  173 H    ()  mg/dL


 


POC Glucose (mg/dL)    170 H  ()  mg/dL








                      Microbiology - Last 24 Hours (Table)











 10/13/22 13:00 Blood Culture - Preliminary





 Blood    No Growth after 72 hours


 


 10/13/22 12:45 Blood Culture - Preliminary





 Blood    No Growth after 72 hours

## 2022-10-17 NOTE — P.PN
Subjective


Progress Note Date: 10/15/22





Patient is a 61-year-old male with a known history of squamous cell carcinoma of

the lung with metastasis and is on immunotherapy, hypertension, diabetes type 2,

COPD and previous history of smoking presents to ER with complaints of left-

sided chest pain.  Patient was recently sharp left-sided chest pain.  EMS gave 

nitro which seemed to improve symptoms.  Patient also complains of redness of 

the left upper chest.  No fever no chills.  Does have cough without any sputum 

production.  Patient is also complaining of severe pain.


No complaints of nausea vomiting abdominal pain or diarrhea.  Denied any dysuria

or hematuria.  On admission patient was requiring 3 Choloxin with nasal cannula.

 Tachycardic with heart rate around 104.


Chest x-ray showed bilateral pulmonary infiltrates show significantly increased 

compared to old exam.  There is increasing pleural thickening in the left 

lateral lung and left lung apex compared to old exam.  This is consistent with 

progression of malignancy.  This could be mesothelioma.


EKG showed supraventricular rhythm.


Laboratory data showed WBC 6.3 hemoglobin 9.1 and platelets 517


Sodium 136 potassium 4.6 chloride 97 bicarb is 31 BUN 19 and creatinine 0.6 

blood sugar is 104 calcium 8.2 magnesium 2.1


Ammonia 18 troponin 0.024, proBNP 1640.  Albumin 2.7.





10/14/2022





Patient is seen and evaluated in follow-up with oncology following.  Patient is 

currently maintained on 6 L normally wears 2 L at home and oxygen saturation is 

93%.  Encouraged weaning as tolerated and patient was maintained on Lasix 20 mg 

daily although will increase to 40 mg twice daily.  Patient was also started on 

IV vancomycin by oncology for possible left chest cellulitis and swelling has 

significantly improved.  Left upper extremity also improved most likely appears 

to be swollen from lymphedema.  Patient currently enrolled in palliative care 

and discussion was had about possible hospice although patient is not willing to

proceed with hospice at this time and would like a second opinion at [facility].  Patient reports his swelling is improved and asking when he can go

home.  Oncology recommending staying for another day at least on IV antibiotics 

and reevaluation in the morning.  Pro-calcitonin was negative at 0.09 and 

current labs reviewed and within normal limits.  WBC is 7.3 and patient is 

afebrile.  Patient denies chest pain or worsening shortness of breath.  Patient 

is having some generalized pain and he chronically takes morphine at home.  Home

medications have been resumed.  Patient reports tolerating diet with no reports 

of nausea or vomiting noted.





10/15/2022


Patient is currently sitting in his bed comfortably.  Awake alert and oriented 

x3.  No complaints of pruritus.  No nausea vomiting abdominal pain.  Breathing 

status is much improved.


Anterior chest wall redness and swelling is much improved and antibiotics will 

be changed to Keflex.  Vancomycin will be discontinued.


Patient is also will be started on prednisone.  Currently on pain management.


Laboratory data reviewed.  Anticipate discharge in next 24 to 48 hours.











Review of systems:


Constitutional: No reports of fatigue, fever, or chills


Cardiovascular: No reports of chest pain or palpitations


Respiratory: No reports of shortness of breath or cough


GI: No reports of nausea, vomiting, or diarrhea


: No reports of dysuria or retention


Neurovascular: No reports of weakness or numbness





All medications have been reviewed





Active Medications





Acetaminophen (Acetaminophen Tab 325 Mg Tab)  650 mg PO Q4HR PRN


   PRN Reason: Mild Pain or Fever > 100.5


Albuterol/Ipratropium (Ipratropium-Albuterol 3 Ml Neb)  3 ml INHALATION RT-QID 

[facility]


   Last Admin: 10/14/22 15:25 Dose:  3 ml


   


Dextrose/Water (Dextrose 50% Syringe 50 Ml)  25 ml IVP PER PROTOCOL PRN; 

Protocol


   PRN Reason: Hypoglycemia


Dextrose/Water (Dextrose 50% Syringe 50 Ml)  50 ml IVP PER PROTOCOL PRN; 

Protocol


   PRN Reason: Hypoglycemia


Diazepam (Diazepam 5 Mg Tab)  5 mg PO DAILY PRN


   PRN Reason: prior to radiation


   Last Admin: 10/13/22 01:30 Dose:  5 mg


   


Docusate Sodium (Docusate 100 Mg Cap)  100 mg PO BID [facility]


   Last Admin: 10/14/22 08:48 Dose:  100 mg


   


Enoxaparin Sodium (Enoxaparin 40 Mg/0.4 Ml Syringe)  40 mg SQ DAILY [facility]


   Last Admin: 10/14/22 08:48 Dose:  40 mg


   


Furosemide (Furosemide 40 Mg Tab)  40 mg PO BID [facility]


   Last Admin: 10/14/22 14:11 Dose:  40 mg


   


Hydromorphone HCl (Hydromorphone 1 Mg/Ml 1 Ml Syringe)  1 mg IVP Q4HR PRN


   PRN Reason: Pain


   Last Admin: 10/13/22 14:32 Dose:  1 mg


   


Vancomycin HCl 2,000 mg/ (Sodium Chloride)  500 mls @ 167 mls/hr IVPB Q12H [facility]


   Last Admin: 10/14/22 08:51 Dose:  167 mls/hr


   


Ibuprofen (Ibuprofen 400 Mg Tab)  400 mg PO Q6H PRN


   PRN Reason: Pain


Insulin Aspart (Insulin Aspart (Novolog) 100 Unit/Ml Vial)  0 unit SQ ACHS [facility]; 

Protocol


Loratadine (Loratadine 10 Mg Tab)  10 mg PO DAILY [facility]


   Last Admin: 10/14/22 08:49 Dose:  10 mg


   


Methylprednisolone Sodium Succinate (Methylprednisolone Sod Succi 125 Mg/2 Ml 

Vial)  60 mg IV Q6HR [facility]


   Last Admin: 10/14/22 12:31 Dose:  60 mg


   


Miscellaneous Information (Vancomycin Trough Due 1 Each Misc)  0 each MISCELLANE

AS DIRECTED ONE


   Stop: 10/16/22 09:01


Morphine Sulfate (Morphine Sulfate Er 30 Mg Tablet)  30 mg PO Q12HR [facility]; 

Protocol


   Last Admin: 10/14/22 08:48 Dose:  30 mg


   


Naloxone HCl (Naloxone 0.4 Mg/Ml 1 Ml Vial)  0.2 mg IVP Q2M PRN


   PRN Reason: Opioid Reversal


Ondansetron HCl (Ondansetron Odt 8 Mg Tab.Rapdis)  8 mg PO TID PRN


   PRN Reason: Nausea And Vomiting


Pregabalin (Pregabalin 100 Mg Cap)  100 mg PO BID [facility]


   Last Admin: 10/14/22 08:49 Dose:  100 mg


   





Physical exam:





Gen: This is a 61-year-old male was awake, alert and oriented 3, well-

developed, well-nourished, ill-appearing, obese


HEENT: Head is atraumatic, normocephalic. Pupils equal, round. Sclerae is 

anicteric. 


NECK: Supple. No JVD. No lymphadenopathy. No thyromegaly. 


LUNGS: Diminished breath sounds bilaterally with some scattered crackles noted 

at the bases.  Some minimal expiratory wheezing noted.  No intercostal 

retractions.


HEART: Regular rate and rhythm. No murmur. 


ABDOMEN: Soft. Bowel sounds are present. No masses.  No tenderness.


EXTREMITIES: No pedal edema.  No calf tenderness.  Bilateral lower extremity 

chronic skin sloughing and vascular disease 


NEUROLOGICAL: Patient is awake, alert and oriented x3. Cranial nerves 2 through 

12 are grossly intact. 








Assessment:





Acute left upper chest pain with redness over the anterior chest and swelling 

and cellulitis.


Chest pain.  Ruled out ACS.


Squamous cell carcinoma of the lung with progression as per chest x-ray 

findings.  Patient is status post chemo/radiation and currently on immunotherapy

every 2 weeks.


Hypovolemic hyponatremia, Improved 


Malignancy related pain


Anemia of chronic disease


COPD/asthma with mild exacerbation.  On oxygen at 2 L via nasal cannula.


Prior history of smoking


Hypertension


Diabetes type 2 non-insulin-dependent


DVT prophylaxis with Lovenox subcu.





Plan:





Patient will be continued on oxygen supplementation via nasal cannula. Currently

maintained on humidified 6L via NC and discussed with patient, family, and 

nursing staff about weaning as tolerated. Patient has 2L via NC chronically.  

Continue with IV steroids and duo nebs and also maintained on antibiotics in the

form of vancomycin for left upper chest cellulitis. Markedly improved swelling 

and redness.  Appreciate oncology recommendations as they recommend continuing 

with IV abx for at least 24 more hours and possibly switch to oral.


Continue with insulin sliding scale and other home medications.


Continued pain management, bowel regimen and follow-up closely.  Prognosis poor 

at this time. Family to continue with palliative and are discussing further 

about code status and hospice, but not at this time. Possibly seeking second 

opinion outpatient and will need further imaging outpatient. Will increase lasix

to 40mg bid PO and follow up labs in the am. 


Patient is eager to be discharged. Prognosis remains guarded. Possible discharge

in 24-48 hours with oncological recommendations. 








Objective





- Vital Signs


Vital signs: 


                                   Vital Signs











Temp  98.2 F   10/15/22 12:53


 


Pulse  94   10/15/22 12:53


 


Resp  18   10/15/22 12:53


 


BP  109/61   10/15/22 12:53


 


Pulse Ox  97   10/15/22 12:53


 


FiO2      








                                 Intake & Output











 10/14/22 10/15/22 10/15/22





 18:59 06:59 18:59


 


Intake Total 500  296


 


Balance 500  296


 


Weight  107.4 kg 


 


Intake:   


 


  Intake, IV Titration 500  





  Amount   


 


    Vancomycin 2,000 mg In 500  





    Sodium Chloride 0.9% 500   





    ml 500 ml @ 167 mls/hr   





    IVPB Q12H [facility] Rx#:   





    [id number]   


 


  Oral   296


 


Other:   


 


  Voiding Method Urinal Urinal Toilet





   Urinal


 


  # Voids 2 3 


 


  # Bowel Movements 2  














- Labs


CBC & Chem 7: 


                                 10/16/22 06:34





                                 10/16/22 06:34


Labs: 


                  Abnormal Lab Results - Last 24 Hours (Table)











  10/14/22 10/14/22 10/15/22 Range/Units





  16:55 20:11 06:44 


 


POC Glucose (mg/dL)  196 H  177 H  215 H  ()  mg/dL














  10/15/22 Range/Units





  12:20 


 


POC Glucose (mg/dL)  162 H  ()  mg/dL








                      Microbiology - Last 24 Hours (Table)











 10/13/22 13:00 Blood Culture - Preliminary





 Blood    No Growth after 24 hours


 


 10/13/22 12:45 Blood Culture - Preliminary





 Blood    No Growth after 24 hours

## 2022-11-29 ENCOUNTER — HOSPITAL ENCOUNTER (OUTPATIENT)
Dept: HOSPITAL 47 - RADCTMAIN | Age: 62
Discharge: HOME | End: 2022-11-29
Attending: INTERNAL MEDICINE
Payer: MEDICARE

## 2022-11-29 DIAGNOSIS — C34.12: Primary | ICD-10-CM

## 2022-11-29 LAB — BUN SERPL-SCNC: 16 MG/DL (ref 9–20)

## 2022-11-29 PROCEDURE — 36415 COLL VENOUS BLD VENIPUNCTURE: CPT

## 2022-11-29 PROCEDURE — 71260 CT THORAX DX C+: CPT

## 2022-11-29 PROCEDURE — 82565 ASSAY OF CREATININE: CPT

## 2022-11-29 PROCEDURE — 84520 ASSAY OF UREA NITROGEN: CPT

## 2022-11-29 NOTE — CT
EXAMINATION TYPE: CT chest w con

 

DATE OF EXAM: 11/29/2022

 

COMPARISON: Prior CT October 17, 2022 and older CTs

 

HISTORY: f/u lung ca

 

CT DLP: 788 mGycm.   Automated Exposure Control for Dose Reduction was Utilized.

 

TECHNIQUE:  CT scan of the thorax is performed following with IV Contrast, patient injected with 100c
c mL of Isovue 300.  

 

FINDINGS:

 

LUNGS: Moderate underlying emphysematous change greatest in the upper lobes is redemonstrated. There 
is persistent peripheral left upper lung mass and/or masslike consolidation destroying portions of th
e anterior upper ribs measuring approximately 10.5 x 6.1 cm axial image 13 slightly larger size to mo
st recent prior study. Lesion encases portions of the left subclavian artery and vein in the left zach
g apex similar to prior. Some left-sided volume loss with mediastinal shift in the upper lung is rede
monstrated. 

 

Scattered spiculated and rounded nodules bilaterally are redemonstrated. Overall mixed response with 
6 mm spiculated nodule at site of prior 1.9 x 1.5 cm spiculated nodule posterior inferior right upper
 lobe axial image 29 but enlarging 2.0 x 1.9 cm spiculated left upper lobe nodule axial image 27 vers
us prior study.

 

MEDIASTINUM: Calcification a level of the aortic and mitral valve is redemonstrated. Coronary artery 
calcification redemonstrated. No cardiomegaly or pericardial effusion. No definitive new thoracic álvaro
nopathy.

 

OTHER: Dependent small gallstones and/or gallbladder sludge ball redemonstrated. Bilateral gynecomast
ia is noted. Asymmetric mild to moderate left breast edema presumed posttreatment change is redemonst
rated.

 

IMPRESSION: Overall mixed response to high grade neoplasm as detailed above from most recent CT.

## 2022-12-03 ENCOUNTER — HOSPITAL ENCOUNTER (INPATIENT)
Dept: HOSPITAL 47 - EC | Age: 62
LOS: 11 days | Discharge: NURSING, REHAB OR LONG-TERM CARE | DRG: 264 | End: 2022-12-14
Attending: INTERNAL MEDICINE | Admitting: INTERNAL MEDICINE
Payer: MEDICARE

## 2022-12-03 VITALS — BODY MASS INDEX: 29.4 KG/M2

## 2022-12-03 DIAGNOSIS — J96.21: ICD-10-CM

## 2022-12-03 DIAGNOSIS — L08.9: ICD-10-CM

## 2022-12-03 DIAGNOSIS — R41.82: ICD-10-CM

## 2022-12-03 DIAGNOSIS — C34.12: ICD-10-CM

## 2022-12-03 DIAGNOSIS — F10.91: ICD-10-CM

## 2022-12-03 DIAGNOSIS — Z79.899: ICD-10-CM

## 2022-12-03 DIAGNOSIS — Z79.891: ICD-10-CM

## 2022-12-03 DIAGNOSIS — Z79.2: ICD-10-CM

## 2022-12-03 DIAGNOSIS — I05.0: ICD-10-CM

## 2022-12-03 DIAGNOSIS — D84.821: ICD-10-CM

## 2022-12-03 DIAGNOSIS — Z99.81: ICD-10-CM

## 2022-12-03 DIAGNOSIS — D75.838: ICD-10-CM

## 2022-12-03 DIAGNOSIS — J44.1: ICD-10-CM

## 2022-12-03 DIAGNOSIS — Z87.891: ICD-10-CM

## 2022-12-03 DIAGNOSIS — L97.919: ICD-10-CM

## 2022-12-03 DIAGNOSIS — Z89.422: ICD-10-CM

## 2022-12-03 DIAGNOSIS — L08.89: ICD-10-CM

## 2022-12-03 DIAGNOSIS — L97.929: ICD-10-CM

## 2022-12-03 DIAGNOSIS — G89.3: ICD-10-CM

## 2022-12-03 DIAGNOSIS — M86.8X7: ICD-10-CM

## 2022-12-03 DIAGNOSIS — E11.69: ICD-10-CM

## 2022-12-03 DIAGNOSIS — R79.89: ICD-10-CM

## 2022-12-03 DIAGNOSIS — I35.0: ICD-10-CM

## 2022-12-03 DIAGNOSIS — M79.89: ICD-10-CM

## 2022-12-03 DIAGNOSIS — K59.03: ICD-10-CM

## 2022-12-03 DIAGNOSIS — Z20.822: ICD-10-CM

## 2022-12-03 DIAGNOSIS — I49.3: ICD-10-CM

## 2022-12-03 DIAGNOSIS — B96.89: ICD-10-CM

## 2022-12-03 DIAGNOSIS — R01.1: ICD-10-CM

## 2022-12-03 DIAGNOSIS — C78.01: ICD-10-CM

## 2022-12-03 DIAGNOSIS — E11.42: ICD-10-CM

## 2022-12-03 DIAGNOSIS — E11.621: ICD-10-CM

## 2022-12-03 DIAGNOSIS — Z83.3: ICD-10-CM

## 2022-12-03 DIAGNOSIS — Z28.310: ICD-10-CM

## 2022-12-03 DIAGNOSIS — L97.513: ICD-10-CM

## 2022-12-03 DIAGNOSIS — B95.61: ICD-10-CM

## 2022-12-03 DIAGNOSIS — I89.0: ICD-10-CM

## 2022-12-03 DIAGNOSIS — E11.628: ICD-10-CM

## 2022-12-03 DIAGNOSIS — T40.2X5A: ICD-10-CM

## 2022-12-03 DIAGNOSIS — C78.02: ICD-10-CM

## 2022-12-03 DIAGNOSIS — Z88.8: ICD-10-CM

## 2022-12-03 DIAGNOSIS — I11.0: Primary | ICD-10-CM

## 2022-12-03 DIAGNOSIS — R19.7: ICD-10-CM

## 2022-12-03 DIAGNOSIS — C79.51: ICD-10-CM

## 2022-12-03 DIAGNOSIS — D63.0: ICD-10-CM

## 2022-12-03 DIAGNOSIS — Z86.14: ICD-10-CM

## 2022-12-03 DIAGNOSIS — E11.622: ICD-10-CM

## 2022-12-03 DIAGNOSIS — I50.33: ICD-10-CM

## 2022-12-03 LAB
ALBUMIN SERPL-MCNC: 2.6 G/DL (ref 3.5–5)
ALP SERPL-CCNC: 107 U/L (ref 38–126)
ALT SERPL-CCNC: 19 U/L (ref 4–49)
ANION GAP SERPL CALC-SCNC: 1 MMOL/L
APTT BLD: 31.4 SEC (ref 22–30)
AST SERPL-CCNC: 30 U/L (ref 17–59)
BASOPHILS # BLD AUTO: 0 K/UL (ref 0–0.2)
BASOPHILS NFR BLD AUTO: 0 %
BUN SERPL-SCNC: 15 MG/DL (ref 9–20)
CALCIUM SPEC-MCNC: 8 MG/DL (ref 8.4–10.2)
CHLORIDE SERPL-SCNC: 98 MMOL/L (ref 98–107)
CO2 SERPL-SCNC: 38 MMOL/L (ref 22–30)
EOSINOPHIL # BLD AUTO: 0.1 K/UL (ref 0–0.7)
EOSINOPHIL NFR BLD AUTO: 1 %
ERYTHROCYTE [DISTWIDTH] IN BLOOD BY AUTOMATED COUNT: 3.67 M/UL (ref 4.3–5.9)
ERYTHROCYTE [DISTWIDTH] IN BLOOD: 15.6 % (ref 11.5–15.5)
GLUCOSE SERPL-MCNC: 113 MG/DL (ref 74–99)
HCT VFR BLD AUTO: 31.1 % (ref 39–53)
HGB BLD-MCNC: 8.8 GM/DL (ref 13–17.5)
INR PPP: 1.1 (ref ?–1.2)
LYMPHOCYTES # SPEC AUTO: 1.4 K/UL (ref 1–4.8)
LYMPHOCYTES NFR SPEC AUTO: 11 %
MCH RBC QN AUTO: 23.9 PG (ref 25–35)
MCHC RBC AUTO-ENTMCNC: 28.2 G/DL (ref 31–37)
MCV RBC AUTO: 84.8 FL (ref 80–100)
MONOCYTES # BLD AUTO: 0.6 K/UL (ref 0–1)
MONOCYTES NFR BLD AUTO: 5 %
NEUTROPHILS # BLD AUTO: 10.4 K/UL (ref 1.3–7.7)
NEUTROPHILS NFR BLD AUTO: 81 %
PH UR: 5.5 [PH] (ref 5–8)
PLATELET # BLD AUTO: 628 K/UL (ref 150–450)
POTASSIUM SERPL-SCNC: 4.3 MMOL/L (ref 3.5–5.1)
PROT SERPL-MCNC: 6.1 G/DL (ref 6.3–8.2)
PT BLD: 11.9 SEC (ref 9–12)
SODIUM SERPL-SCNC: 137 MMOL/L (ref 137–145)
SP GR UR: 1.02 (ref 1–1.03)
UROBILINOGEN UR QL STRIP: 4 MG/DL (ref ?–2)
WBC # BLD AUTO: 12.9 K/UL (ref 3.8–10.6)

## 2022-12-03 PROCEDURE — 83550 IRON BINDING TEST: CPT

## 2022-12-03 PROCEDURE — 36415 COLL VENOUS BLD VENIPUNCTURE: CPT

## 2022-12-03 PROCEDURE — 85027 COMPLETE CBC AUTOMATED: CPT

## 2022-12-03 PROCEDURE — 80048 BASIC METABOLIC PNL TOTAL CA: CPT

## 2022-12-03 PROCEDURE — 94640 AIRWAY INHALATION TREATMENT: CPT

## 2022-12-03 PROCEDURE — 83615 LACTATE (LD) (LDH) ENZYME: CPT

## 2022-12-03 PROCEDURE — 82746 ASSAY OF FOLIC ACID SERUM: CPT

## 2022-12-03 PROCEDURE — 82248 BILIRUBIN DIRECT: CPT

## 2022-12-03 PROCEDURE — 71046 X-RAY EXAM CHEST 2 VIEWS: CPT

## 2022-12-03 PROCEDURE — 87205 SMEAR GRAM STAIN: CPT

## 2022-12-03 PROCEDURE — 36600 WITHDRAWAL OF ARTERIAL BLOOD: CPT

## 2022-12-03 PROCEDURE — 86140 C-REACTIVE PROTEIN: CPT

## 2022-12-03 PROCEDURE — 85045 AUTOMATED RETICULOCYTE COUNT: CPT

## 2022-12-03 PROCEDURE — 80053 COMPREHEN METABOLIC PANEL: CPT

## 2022-12-03 PROCEDURE — 78315 BONE IMAGING 3 PHASE: CPT

## 2022-12-03 PROCEDURE — 83880 ASSAY OF NATRIURETIC PEPTIDE: CPT

## 2022-12-03 PROCEDURE — 87186 SC STD MICRODIL/AGAR DIL: CPT

## 2022-12-03 PROCEDURE — 85610 PROTHROMBIN TIME: CPT

## 2022-12-03 PROCEDURE — 84484 ASSAY OF TROPONIN QUANT: CPT

## 2022-12-03 PROCEDURE — 94760 N-INVAS EAR/PLS OXIMETRY 1: CPT

## 2022-12-03 PROCEDURE — 82805 BLOOD GASES W/O2 SATURATION: CPT

## 2022-12-03 PROCEDURE — 85025 COMPLETE CBC W/AUTO DIFF WBC: CPT

## 2022-12-03 PROCEDURE — 87070 CULTURE OTHR SPECIMN AEROBIC: CPT

## 2022-12-03 PROCEDURE — 93970 EXTREMITY STUDY: CPT

## 2022-12-03 PROCEDURE — 70450 CT HEAD/BRAIN W/O DYE: CPT

## 2022-12-03 PROCEDURE — 71275 CT ANGIOGRAPHY CHEST: CPT

## 2022-12-03 PROCEDURE — 82607 VITAMIN B-12: CPT

## 2022-12-03 PROCEDURE — 87075 CULTR BACTERIA EXCEPT BLOOD: CPT

## 2022-12-03 PROCEDURE — 87635 SARS-COV-2 COVID-19 AMP PRB: CPT

## 2022-12-03 PROCEDURE — 99285 EMERGENCY DEPT VISIT HI MDM: CPT

## 2022-12-03 PROCEDURE — 85652 RBC SED RATE AUTOMATED: CPT

## 2022-12-03 PROCEDURE — 85730 THROMBOPLASTIN TIME PARTIAL: CPT

## 2022-12-03 PROCEDURE — 96374 THER/PROPH/DIAG INJ IV PUSH: CPT

## 2022-12-03 PROCEDURE — 83540 ASSAY OF IRON: CPT

## 2022-12-03 PROCEDURE — 96375 TX/PRO/DX INJ NEW DRUG ADDON: CPT

## 2022-12-03 PROCEDURE — 83036 HEMOGLOBIN GLYCOSYLATED A1C: CPT

## 2022-12-03 PROCEDURE — 83010 ASSAY OF HAPTOGLOBIN QUANT: CPT

## 2022-12-03 PROCEDURE — 93306 TTE W/DOPPLER COMPLETE: CPT

## 2022-12-03 PROCEDURE — 93005 ELECTROCARDIOGRAM TRACING: CPT

## 2022-12-03 PROCEDURE — 36573 INSJ PICC RS&I 5 YR+: CPT

## 2022-12-03 PROCEDURE — 87077 CULTURE AEROBIC IDENTIFY: CPT

## 2022-12-03 PROCEDURE — 81003 URINALYSIS AUTO W/O SCOPE: CPT

## 2022-12-03 NOTE — CT
EXAMINATION TYPE: CT chest angio for PE

 

DATE OF EXAM: 12/3/2022

 

COMPARISON: Chest CT scan 11/29/2022

 

HISTORY: R/O PE hx of lung masses

 

CT DLP: 749.8 mGycm

Automated exposure control for dose reduction was used.

 

CONTRAST: 

Performed with IV Contrast, patient injected with 100 mL of Isovue 370.

There are 3-D post processed images. There is soft tissue density mass at the left lung apex with rib
 destruction. There is 2 cm mass in the anterior mediastinum on the right side. The mass measures up 
to 7 cm in thickness and extends along the upper lateral chest wall. There is multiple areas of rib d
estruction.

 

Thoracic aorta is intact. No aneurysm. There is normal contrast opacification of the pulmonary arteri
es. No filling defect.

 

There are numerous variable sized nodular densities in the lung fields measuring up to 2 cm. There is
 right pleural effusion. There is posterior right lateral rib destruction adjacent to the pleural thi
ckening. Heart size is normal. No pericardial effusion. There are no hilar masses.

 

IMPRESSION:

No evidence of pulmonary embolism. Pleural thickening and destructive changes in the left upper later
al ribs. Large mass involving the upper lateral chest wall.

 

Numerous nodular masses throughout the lung fields. Right pleural effusion. Destructive changes also 
present in adjacent ribs. Right pleural effusion slightly increased compared to recent exam.

## 2022-12-03 NOTE — XR
EXAMINATION TYPE: XR foot complete RT

 

DATE OF EXAM: 12/3/2022

 

COMPARISON: NONE

 

HISTORY: Foot wound.

 

TECHNIQUE: 3 views

 

FINDINGS: There is plantar calcaneal spurring. Metatarsals are intact. There is mild multiple hammert
oe deformity. There is spurring of the intertarsal joints.

 

IMPRESSION: There is degenerative changes. No fracture seen. No evidence of osteomyelitis.

## 2022-12-03 NOTE — US
EXAMINATION TYPE: US venous doppler duplex LE 

 

DATE OF EXAM: 12/3/2022 9:40 PM

 

COMPARISON: NONE

 

CLINICAL HISTORY: eval for dvt.. Edema exam limited due to body habitus and postioning in bed.  

 

SIDE PERFORMED: Bilateral  

 

TECHNIQUE:  The lower extremity deep venous system is examined utilizing real time linear array sonog
crystal with graded compression, doppler sonography and color-flow sonography.

 

VESSELS IMAGED:

Common Femoral Vein

Deep Femoral Vein

Greater Saphenous Vein *

Femoral Vein

Popliteal Vein

Small Saphenous Vein *

Proximal Calf Veins

(* superficial vessels)

 

 

 

Right Leg:  Negative for DVT

 

Left Leg:  Negative for DVT

 

 

 

IMPRESSION: No evidence of deep vein thrombosis in both legs.

## 2022-12-03 NOTE — ED
General Adult HPI





- General


Chief complaint: Shortness of Breath


Stated complaint: CHF


Time Seen by Provider: 12/03/22 19:53


Source: patient, family, RN notes reviewed, old records reviewed


Mode of arrival: wheelchair


Limitations: no limitations





- History of Present Illness


Initial comments: 





Patient is a 62-year-old male with past medical history remarkable for heart 

failure, lung cancer, left pancoast tumor, COPD, asthma, diabetes who presents 

emergency Department complaining of worsening shortness of breath, acute on 

chronic right lower extremity edema, acute on chronic left upper extremity 

edema, positive facial edema.  Patient does have a history of this, but is worse

over the last few days.  He is receiving immunotherapy and is on water pills at 

home.  However he has noticed that he is having worsening swelling.  He is also 

having worsening dyspnea on exertion which is somewhat chronic but worse now.  

States the swelling is somewhat improved but the dyspnea seems to be worse.  It 

is not on blood thinners.  Has no history of blood clots.  Recently received a 

CT chest but not a CT angiogram of the chest to evaluate for response to the

rapy.  He denies chest pain, abdominal pain, nausea, vomiting.  Has no other 

acute complaints at this time.  Patient presents for further evaluation at this 

time.  Denies any fevers.  Does endorse a nonproductive cough.  Is on 2 L nasal 

cannula at home usually.  Endorses a right foot wound that was seen within last 

couple days and started on oral antibiotics for.  States it is improving.  

Presents for further evaluation at this time.Patient versus chronic orthopnea.  

Denies PND.  He chronically sleeps sitting up.  Due to his Pancoast tumor, 

usually leans to the right side.  Previously did receive chemotherapy.





- Related Data


                                Home Medications











 Medication  Instructions  Recorded  Confirmed


 


Albuterol Sulfate [Ventolin HFA] 2 puff INHALATION RT-Q4H PRN 09/03/21 12/03/22


 


Morphine Sulfate [Ms Contin] 30 mg PO TID 04/18/22 12/03/22


 


ondansetron HCL [Zofran] 8 mg PO TID PRN 09/17/22 12/03/22


 


Furosemide [Lasix] 20 mg PO DAILY PRN 10/12/22 12/03/22


 


Pregabalin [Lyrica] 200 mg PO BID 10/12/22 12/03/22


 


Cephalexin [Keflex] 500 mg PO QID 12/03/22 12/03/22


 


Ferrous Sulfate [Feosol] 325 mg PO DAILY 12/03/22 12/03/22


 


Ipratropium-Albuterol Nebulize 3 ml INHALATION RT-QID PRN 12/03/22 12/03/22





[Duoneb 0.5 mg-3 mg/3 ml Soln]   


 


Multivitamins, Thera [Multivitamin 1 tab PO DAILY 12/03/22 12/03/22





(formulary)]   








                                  Previous Rx's











 Medication  Instructions  Recorded


 


Loratadine [Claritin] 10 mg PO DAILY #30 tab 09/18/22











                                    Allergies











Allergy/AdvReac Type Severity Reaction Status Date / Time


 


loratadine [From Claritin-D] AdvReac  Confusion Verified 12/03/22 21:44


 


pseudoephedrine AdvReac  Confusion Verified 12/03/22 21:44





[From Claritin-D]     














Review of Systems


ROS Statement: 


Those systems with pertinent positive or pertinent negative responses have been 

documented in the HPI.


Review of Systems:


CONST: Denies fever 


EYES: Denies blurry vision 


ENT: Denies nasal congestion  


C/V:  Denies Chest pain


RESP: Endorses shortness of breath


GI: Denies abdominal pain 


: Denies dysuria  


SKIN: Endorses right foot wound


MSK: Denies joint pain.


NEURO: Denies headache 


ROS Other: All systems not noted in ROS Statement are negative.





Past Medical History


Past Medical History: Asthma, Heart Failure, COPD, Diabetes Mellitus, 

Hypertension


Additional Past Medical History / Comment(s): Uses home O2 as needed only. 

Squamous cell carcinoma of the lung, rib lesion and lymph adenopathy, shoulder 

pain related to lung mass. Pancous tumor left apex


History of Any Multi-Drug Resistant Organisms: MRSA


Date of last positivie culture/infection: 6/23/16


MDRO Source:: Left Foot


Past Surgical History: Hernia Repair, Orthopedic Surgery, Tonsillectomy


Additional Past Surgical History / Comment(s): lt. 5th toe amp


Past Anesthesia/Blood Transfusion Reactions: No Reported Reaction


Past Psychological History: No Psychological Hx Reported


Smoking Status: Former smoker


Past Alcohol Use History: None Reported


Past Drug Use History: None Reported





- Past Family History


  ** Sister(s)


Family Medical History: Cancer





  ** Father


Family Medical History: Congestive Heart Failure (CHF), Diabetes Mellitus





  ** Mother


Family Medical History: Renal Disease





General Exam





- General Exam Comments


Initial Comments: 





General: Appears in mild respiratory distress.  Patient is hunched over 

secondary to his Pancoast tumor affecting stature.


HEAD:  Normal with no signs of head trauma.  Slight facial edema.


EYES:  PERRLA, EOMI, conjunctiva normal, no discharge.


ENT:  Hearing grossly intact, normal oropharynx.


RESPIRATORY: Relatively clear breath sounds bilaterally but they're difficult to

auscultate due to the patient's habitus.


C/V: Regular rate and rhythm.  Significant bilateral lower extremity edema 

primarily in the right lower extremity which patient states is chronic.  It is 

pitting.  S1 and S2 auscultated.  Peripheral pulses 2+ intact throughout.  

Patient also has left upper extremity edema which is chronic and unchanged se

condary to lymphedema in his Pancoast tumor.


ABD:  Abd is soft, nontender, nondistended


EXT: Normal range of motion, no obvious deformity


SKIN: Patient has a skin wound located over the inferior aspect of the right 

foot.  Patient's wife is at bedside and states it is improved.  It is open, 

actively draining.  Clean margins.  Cranial on antibiotics just started within 

the last few days.


NEURO: Alert and oriented 4.  No focal deficits.


Limitations: no limitations





Course


                                   Vital Signs











  12/03/22





  19:46


 


Temperature 98.1 F


 


Pulse Rate 101 H


 


Respiratory 22





Rate 


 


Blood Pressure 157/87


 


O2 Sat by Pulse 83 L





Oximetry 














Medical Decision Making





- Medical Decision Making





Based on the patient's presentation and physical exam, I'm concerned for 

cardiopulmonary etiology for his current symptoms.  This is appeared to be 

somewhat chronic as he does have a history of a pain Closed tumor in his left 

upper lung with significant diffuse edema that seems to be somewhat worse at 

this time, however improving.  His dyspnea does seem worse to him at this time 

despite improvement with Lasix for his swelling.  I'm concerned for possible CHF

exacerbation at this time but cannot rule out ACS or possibly a PE.  He is not 

on thinners.  He does have significant right lower extremity swelling. Patient 

does have a significant risk for blood clot, particularly in the setting of 

worsening shortness of breath that is likely secondary to edema but patient 

states is more or less chronic edema.  Therefore I cannot completely rule out 

pulmonary embolism at this time. We will obtain a cardiopulmonary labs, as well 

as obtain a CT PE despite the recent CT imaging which was not sufficient to 

evaluate for pulmonary wasn't at this time.  We will also obtain a duplex his 

bilateral lower extremities.  He'll be given aspirin, as well as Lasix.  We will

obtain wound cultures of the right foot which seems to be improving per patient,

obtain an x-ray of the right foot.  Patient was in agreement with this plan.  He

was hypoxic on room air but he is on 2 L at baseline and this improves at 

baseline when placed on his 2 L nasal cannula.  He is saturating well on 2 L 

nasal cannula.





EKG showed no signs of acute ischemia.  Laboratory studies were remarkable for 

mild leukocytosis of 12.9, mild chronic anemia which appears stable to 

hemoglobin of 8.8.  Troponin is elevated to 0.10, with the elevated BNP of 4600.

 He is likely having an nSTEMI based on this.  Chest x-ray as interpreted by 

myself reveals bilateral pulmonary interstitial edema versus infiltrates which 

do appear somewhat chronic but somewhat worse in the right lung.  Left lung 

chronically appears this way and is unchanged.  Right foot x-ray as interpreted 

by myself at the site of the wound shows no evidence of osteomyelitis.  Venous 

duplexes were interpreted as showing no evidence of DVT.  We will obtain a CT a

ngiogram at this time and he was in agreement with this plan.  CTA wasn't 

abnormal myself and reveals no obvious pulmonary embolism.  There is the 

redemonstration of left-sided cancer, as well as pleural effusions.  I updated 

the patient as well as his wife.  They expressed understanding.  Due to the 

patient's elevated troponin concern for nstemi he'll be started on a heparin 

drip.  We will start the patient on Lasix.  Echo was ordered.  Cardiology is 

consulted.  Oncology was consulted.  I spoke with the patient's admitting 

physician, Dr. Wallace of the [facility] of the patient was in agreement this plan.Wound

cultures were sent patient's foot wound and as he was just started on oral 

antibiotics for his foot we will continue the oral antibiotics at this time.





- Lab Data


Result diagrams: 


                                 12/03/22 20:10





                                 12/03/22 20:10


                                   Lab Results











  12/03/22 12/03/22 12/03/22 Range/Units





  20:10 20:10 20:10 


 


WBC  12.9 H    (3.8-10.6)  k/uL


 


RBC  3.67 L    (4.30-5.90)  m/uL


 


Hgb  8.8 L    (13.0-17.5)  gm/dL


 


Hct  31.1 L    (39.0-53.0)  %


 


MCV  84.8  D    (80.0-100.0)  fL


 


MCH  23.9 L    (25.0-35.0)  pg


 


MCHC  28.2 L    (31.0-37.0)  g/dL


 


RDW  15.6 H    (11.5-15.5)  %


 


Plt Count  628 H    (150-450)  k/uL


 


MPV  8.0    


 


Neutrophils %  81    %


 


Lymphocytes %  11    %


 


Monocytes %  5    %


 


Eosinophils %  1    %


 


Basophils %  0    %


 


Neutrophils #  10.4 H    (1.3-7.7)  k/uL


 


Lymphocytes #  1.4    (1.0-4.8)  k/uL


 


Monocytes #  0.6    (0-1.0)  k/uL


 


Eosinophils #  0.1    (0-0.7)  k/uL


 


Basophils #  0.0    (0-0.2)  k/uL


 


Hypochromasia  Marked    


 


PT   11.9   (9.0-12.0)  sec


 


INR   1.1   (<1.2)  


 


APTT   31.4 H   (22.0-30.0)  sec


 


Sodium    137  (137-145)  mmol/L


 


Potassium    4.3  (3.5-5.1)  mmol/L


 


Chloride    98  ()  mmol/L


 


Carbon Dioxide    38 H  (22-30)  mmol/L


 


Anion Gap    1  mmol/L


 


BUN    15  (9-20)  mg/dL


 


Creatinine    0.50 L  (0.66-1.25)  mg/dL


 


Est GFR (CKD-EPI)AfAm    >90  (>60 ml/min/1.73 sqM)  


 


Est GFR (CKD-EPI)NonAf    >90  (>60 ml/min/1.73 sqM)  


 


Glucose    113 H  (74-99)  mg/dL


 


Calcium    8.0 L  (8.4-10.2)  mg/dL


 


Total Bilirubin    0.4  (0.2-1.3)  mg/dL


 


AST    30  (17-59)  U/L


 


ALT    19  (4-49)  U/L


 


Alkaline Phosphatase    107  ()  U/L


 


Troponin I     (0.000-0.034)  ng/mL


 


NT-Pro-B Natriuret Pep     pg/mL


 


Total Protein    6.1 L  (6.3-8.2)  g/dL


 


Albumin    2.6 L  (3.5-5.0)  g/dL


 


Urine Color     


 


Urine Appearance     (Clear)  


 


Urine pH     (5.0-8.0)  


 


Ur Specific Gravity     (1.001-1.035)  


 


Urine Protein     (Negative)  


 


Urine Glucose (UA)     (Negative)  


 


Urine Ketones     (Negative)  


 


Urine Blood     (Negative)  


 


Urine Nitrite     (Negative)  


 


Urine Bilirubin     (Negative)  


 


Urine Urobilinogen     (<2.0)  mg/dL


 


Ur Leukocyte Esterase     (Negative)  














  12/03/22 12/03/22 12/03/22 Range/Units





  20:10 20:10 21:16 


 


WBC     (3.8-10.6)  k/uL


 


RBC     (4.30-5.90)  m/uL


 


Hgb     (13.0-17.5)  gm/dL


 


Hct     (39.0-53.0)  %


 


MCV     (80.0-100.0)  fL


 


MCH     (25.0-35.0)  pg


 


MCHC     (31.0-37.0)  g/dL


 


RDW     (11.5-15.5)  %


 


Plt Count     (150-450)  k/uL


 


MPV     


 


Neutrophils %     %


 


Lymphocytes %     %


 


Monocytes %     %


 


Eosinophils %     %


 


Basophils %     %


 


Neutrophils #     (1.3-7.7)  k/uL


 


Lymphocytes #     (1.0-4.8)  k/uL


 


Monocytes #     (0-1.0)  k/uL


 


Eosinophils #     (0-0.7)  k/uL


 


Basophils #     (0-0.2)  k/uL


 


Hypochromasia     


 


PT     (9.0-12.0)  sec


 


INR     (<1.2)  


 


APTT     (22.0-30.0)  sec


 


Sodium     (137-145)  mmol/L


 


Potassium     (3.5-5.1)  mmol/L


 


Chloride     ()  mmol/L


 


Carbon Dioxide     (22-30)  mmol/L


 


Anion Gap     mmol/L


 


BUN     (9-20)  mg/dL


 


Creatinine     (0.66-1.25)  mg/dL


 


Est GFR (CKD-EPI)AfAm     (>60 ml/min/1.73 sqM)  


 


Est GFR (CKD-EPI)NonAf     (>60 ml/min/1.73 sqM)  


 


Glucose     (74-99)  mg/dL


 


Calcium     (8.4-10.2)  mg/dL


 


Total Bilirubin     (0.2-1.3)  mg/dL


 


AST     (17-59)  U/L


 


ALT     (4-49)  U/L


 


Alkaline Phosphatase     ()  U/L


 


Troponin I  0.100 H*    (0.000-0.034)  ng/mL


 


NT-Pro-B Natriuret Pep   4680   pg/mL


 


Total Protein     (6.3-8.2)  g/dL


 


Albumin     (3.5-5.0)  g/dL


 


Urine Color    Yellow  


 


Urine Appearance    Clear  (Clear)  


 


Urine pH    5.5  (5.0-8.0)  


 


Ur Specific Gravity    1.025  (1.001-1.035)  


 


Urine Protein    Trace H  (Negative)  


 


Urine Glucose (UA)    Negative  (Negative)  


 


Urine Ketones    Negative  (Negative)  


 


Urine Blood    Negative  (Negative)  


 


Urine Nitrite    Negative  (Negative)  


 


Urine Bilirubin    Negative  (Negative)  


 


Urine Urobilinogen    4.0  (<2.0)  mg/dL


 


Ur Leukocyte Esterase    Negative  (Negative)  














- EKG Data


-: EKG Interpreted by Me


EKG Comments: 





12-lead Electrocardiogram Interpretation Note





EKG was reviewed and interpreted by myself. 12-lead ECG performed at 2112 is 

interpreted by me as revealing normal sinus rhythm at a rate of 91 beats per 

minute.  Axis is normal.  NE interval is 92 ms, QRS duration is 112 ms, QTc is 

420 ms..  There were no acute ST or T wave abnormalities to suggest myocardial 

ischemia or injury. R wave progression across the precordium was satisfactory. 

By my interpretation this EKG is non-diagnostic for acute ischemia.  When 

compared with EKG from October 2022, no significant ischemic change.





Disposition


Clinical Impression: 


 CHF (congestive heart failure), NSTEMI (non-ST elevated myocardial infarction),

Cancer, Wound of foot, Pleural effusion





Disposition: ADMITTED AS IP TO THIS HOSP


Condition: Stable


Referrals: 


Jesica Arora MD [Primary Care Provider] - 1-2 days


Time of Disposition: 22:10

## 2022-12-04 LAB
ANION GAP SERPL CALC-SCNC: 2 MMOL/L
APTT BLD: 41.7 SEC (ref 22–30)
BASOPHILS # BLD AUTO: 0 K/UL (ref 0–0.2)
BASOPHILS NFR BLD AUTO: 0 %
BILIRUB INDIRECT SERPL-MCNC: 0 MG/DL (ref 0–1.1)
BILIRUBIN DIRECT+TOT PNL SERPL-MCNC: 0.2 MG/DL (ref 0–0.2)
BUN SERPL-SCNC: 12 MG/DL (ref 9–20)
CALCIUM SPEC-MCNC: 7.5 MG/DL (ref 8.4–10.2)
CHLORIDE SERPL-SCNC: 96 MMOL/L (ref 98–107)
CO2 SERPL-SCNC: 40 MMOL/L (ref 22–30)
EOSINOPHIL # BLD AUTO: 0.1 K/UL (ref 0–0.7)
EOSINOPHIL NFR BLD AUTO: 1 %
ERYTHROCYTE [DISTWIDTH] IN BLOOD BY AUTOMATED COUNT: 3.67 M/UL (ref 4.3–5.9)
ERYTHROCYTE [DISTWIDTH] IN BLOOD: 15.4 % (ref 11.5–15.5)
GLUCOSE BLD-MCNC: 113 MG/DL (ref 70–110)
GLUCOSE BLD-MCNC: 138 MG/DL (ref 70–110)
GLUCOSE BLD-MCNC: 140 MG/DL (ref 70–110)
GLUCOSE BLD-MCNC: 140 MG/DL (ref 70–110)
GLUCOSE SERPL-MCNC: 115 MG/DL (ref 74–99)
HCT VFR BLD AUTO: 31.5 % (ref 39–53)
HGB BLD-MCNC: 8.8 GM/DL (ref 13–17.5)
INR PPP: 1.2 (ref ?–1.2)
IRON SERPL-MCNC: 15 UG/DL (ref 65–175)
LDH SPEC-CCNC: 857 U/L (ref 313–618)
LYMPHOCYTES # SPEC AUTO: 1.1 K/UL (ref 1–4.8)
LYMPHOCYTES NFR SPEC AUTO: 8 %
MCH RBC QN AUTO: 23.9 PG (ref 25–35)
MCHC RBC AUTO-ENTMCNC: 27.8 G/DL (ref 31–37)
MCV RBC AUTO: 85.9 FL (ref 80–100)
MONOCYTES # BLD AUTO: 0.4 K/UL (ref 0–1)
MONOCYTES NFR BLD AUTO: 3 %
NEUTROPHILS # BLD AUTO: 11.9 K/UL (ref 1.3–7.7)
NEUTROPHILS NFR BLD AUTO: 87 %
PLATELET # BLD AUTO: 545 K/UL (ref 150–450)
POTASSIUM SERPL-SCNC: 3.6 MMOL/L (ref 3.5–5.1)
PT BLD: 12 SEC (ref 9–12)
SODIUM SERPL-SCNC: 138 MMOL/L (ref 137–145)
VIT B12 SERPL-MCNC: 610 PG/ML (ref 200–944)
WBC # BLD AUTO: 13.7 K/UL (ref 3.8–10.6)

## 2022-12-04 RX ADMIN — CEPHALEXIN SCH MG: 500 CAPSULE ORAL at 20:29

## 2022-12-04 RX ADMIN — CEPHALEXIN SCH MG: 500 CAPSULE ORAL at 17:17

## 2022-12-04 RX ADMIN — HEPARIN SODIUM SCH UNIT: 5000 INJECTION INTRAVENOUS; SUBCUTANEOUS at 22:49

## 2022-12-04 RX ADMIN — Medication SCH MG: at 09:45

## 2022-12-04 RX ADMIN — IPRATROPIUM BROMIDE AND ALBUTEROL SULFATE PRN ML: .5; 3 SOLUTION RESPIRATORY (INHALATION) at 16:13

## 2022-12-04 RX ADMIN — MORPHINE SULFATE SCH MG: 30 TABLET, FILM COATED, EXTENDED RELEASE ORAL at 20:29

## 2022-12-04 RX ADMIN — LORATADINE SCH MG: 10 TABLET ORAL at 09:45

## 2022-12-04 RX ADMIN — PREGABALIN SCH MG: 100 CAPSULE ORAL at 09:45

## 2022-12-04 RX ADMIN — IPRATROPIUM BROMIDE AND ALBUTEROL SULFATE PRN ML: .5; 3 SOLUTION RESPIRATORY (INHALATION) at 19:43

## 2022-12-04 RX ADMIN — METOPROLOL TARTRATE SCH MG: 25 TABLET, FILM COATED ORAL at 09:45

## 2022-12-04 RX ADMIN — IPRATROPIUM BROMIDE AND ALBUTEROL SULFATE PRN ML: .5; 3 SOLUTION RESPIRATORY (INHALATION) at 07:47

## 2022-12-04 RX ADMIN — FUROSEMIDE SCH MG: 10 INJECTION, SOLUTION INTRAMUSCULAR; INTRAVENOUS at 20:30

## 2022-12-04 RX ADMIN — MORPHINE SULFATE SCH MG: 30 TABLET, FILM COATED, EXTENDED RELEASE ORAL at 14:45

## 2022-12-04 RX ADMIN — METOPROLOL TARTRATE SCH MG: 25 TABLET, FILM COATED ORAL at 20:30

## 2022-12-04 RX ADMIN — IPRATROPIUM BROMIDE AND ALBUTEROL SULFATE PRN ML: .5; 3 SOLUTION RESPIRATORY (INHALATION) at 11:45

## 2022-12-04 RX ADMIN — INSULIN ASPART SCH: 100 INJECTION, SOLUTION INTRAVENOUS; SUBCUTANEOUS at 16:50

## 2022-12-04 RX ADMIN — CEPHALEXIN SCH MG: 500 CAPSULE ORAL at 12:58

## 2022-12-04 RX ADMIN — FUROSEMIDE SCH MG: 10 INJECTION, SOLUTION INTRAMUSCULAR; INTRAVENOUS at 09:45

## 2022-12-04 RX ADMIN — CEPHALEXIN SCH MG: 500 CAPSULE ORAL at 09:45

## 2022-12-04 RX ADMIN — PREGABALIN SCH MG: 100 CAPSULE ORAL at 20:30

## 2022-12-04 RX ADMIN — INSULIN ASPART SCH: 100 INJECTION, SOLUTION INTRAVENOUS; SUBCUTANEOUS at 20:25

## 2022-12-04 RX ADMIN — HEPARIN SODIUM SCH UNIT: 5000 INJECTION INTRAVENOUS; SUBCUTANEOUS at 14:47

## 2022-12-04 NOTE — P.HPIM
History of Present Illness


H&P Date: 12/04/22


History of present illness; patient is a 62-year-old gentleman with past medical

history significant for lung cancer on immunotherapy, congestive heart Failure, 

COPD, asthma who presented to the ER because of worsening shortness of breath 

and swelling of legs.  Patient has been noticing increasing shortness of breath 

on exertion for the last couple of days associated with increased swelling of 

legs.  Patient was on diuretics at home but stated that it was not working and 

he continues to be short of breath.  Patient also currently on antibiotics for 

right foot wound.  Because of this worsening shortness of breath and swelling of

legs patient came to the ER.  Initial lab work showed patient to have a white 

count of 12.9, hemoglobin of 8.8, platelet count of 628, sodium 137, potassium 

4.3, carbon dioxide 38, troponin 0.1.  CTA chest done showed no evidence of 

pulmonary embolism.  It did show pleural thickening and destructive changes in 

the left upper ribs.  Large mass involving the upper limits of chest wall was 

also seen.  Patient was admitted to hospitalist service for further evaluation 

and treatment











REVIEW OF SYSTEMS: 


CONSTITUTIONAL: No fever, no malaise, no fatigue. 


HEENT: No recent visual problems or hearing problems. Denied any sore throat. 


CARDIOVASCULAR: No chest pain, orthopnea,


PULMONARY: As mentioned in HPI


GASTROINTESTINAL: No diarrhea, no nausea, no vomiting, no abdominal pain. 


NEUROLOGICAL: No headaches, no weakness, no numbness. 


HEMATOLOGICAL: Denies any bleeding or petechiae. 


GENITOURINARY: Denies any burning micturition, frequency, or urgency. 


MUSCULOSKELETAL/RHEUMATOLOGICAL: Denies any joint pain, swelling, or any muscle 

pain. 


ENDOCRINE: Denies any polyuria or polydipsia. 





The rest of the 14-point review of systems is negative.











PHYSICAL EXAMINATION: 





GENERAL: The patient is alert and oriented x3, not in any acute distress. Well 

developed, well nourished. 


HEENT: Pupils are round and equally reacting to light. EOMI. No scleral icterus.

No conjunctival pallor. Normocephalic, atraumatic. No pharyngeal erythema. No t

hyromegaly. 


CARDIOVASCULAR: S1 and S2 present. No murmurs, rubs, or gallops. 


PULMONARY: Chest is clear to auscultation, no wheezing or crackles. 


ABDOMEN: Soft, nontender, nondistended, normoactive bowel sounds. No palpable 

organomegaly. 


MUSCULOSKELETAL: No joint swelling or deformity.


EXTREMITIES: 2+ pitting edema lower extremities, right foot wound seen


NEUROLOGICAL: Gross neurological examination did not reveal any focal deficits. 


SKIN: No rashes. 





Assessment and plan


Acute onset congestive heart failure.


COPD exacerbation


Elevated troponin.


Right foot wound


Lung cancer


Diabetes mellitus


Hypertension





Plan


Strict I's and O's


Daily weights.


Monitor troponins


Continue telemetry monitoring.


Monitor electrolytes.


Monitor renal functions


Ordered 2-D echo


Continue IV Lasix


Consult cardiology.


Consult hematology oncology.


Consult wound care








Past Medical History


Past Medical History: Asthma, Cancer, Heart Failure, COPD, Diabetes Mellitus, 

Hypertension


Additional Past Medical History / Comment(s): Uses home O2 as needed only. 

Squamous cell carcinoma of the lung, rib lesion and lymph adenopathy, shoulder 

pain related to lung mass. Pancous tumor left apex


History of Any Multi-Drug Resistant Organisms: MRSA


Date of last positivie culture/infection: 6/23/16


MDRO Source:: Left Foot


Past Surgical History: Hernia Repair, Orthopedic Surgery, Tonsillectomy


Additional Past Surgical History / Comment(s): lt. 5th toe amp


Past Anesthesia/Blood Transfusion Reactions: No Reported Reaction


Past Psychological History: Anxiety


Smoking Status: Former smoker


Past Alcohol Use History: None Reported


Past Drug Use History: None Reported


Additional Drug Use History / Comment(s): quit drinking 10 years ago in 2012, 

quit smoking Sept 2021





- Past Family History


  ** Sister(s)


Family Medical History: Cancer





  ** Father


Family Medical History: Congestive Heart Failure (CHF), Diabetes Mellitus





  ** Mother


Family Medical History: Renal Disease





Medications and Allergies


                                Home Medications











 Medication  Instructions  Recorded  Confirmed  Type


 


Albuterol Sulfate [Ventolin HFA] 2 puff INHALATION RT-Q4H PRN 09/03/21 12/03/22 

History


 


Morphine Sulfate [Ms Contin] 30 mg PO TID 04/18/22 12/03/22 History


 


ondansetron HCL [Zofran] 8 mg PO TID PRN 09/17/22 12/03/22 History


 


Loratadine [Claritin] 10 mg PO DAILY #30 tab 09/18/22 12/03/22 Rx


 


Furosemide [Lasix] 20 mg PO DAILY PRN 10/12/22 12/03/22 History


 


Pregabalin [Lyrica] 200 mg PO BID 10/12/22 12/03/22 History


 


Cephalexin [Keflex] 500 mg PO QID 12/03/22 12/03/22 History


 


Ferrous Sulfate [Feosol] 325 mg PO DAILY 12/03/22 12/03/22 History


 


Ipratropium-Albuterol Nebulize 3 ml INHALATION RT-QID PRN 12/03/22 12/03/22 

History





[Duoneb 0.5 mg-3 mg/3 ml Soln]    


 


Multivitamins, Thera [Multivitamin 1 tab PO DAILY 12/03/22 12/03/22 History





(formulary)]    








                                    Allergies











Allergy/AdvReac Type Severity Reaction Status Date / Time


 


loratadine [From Claritin-D] AdvReac  Confusion Verified 12/03/22 21:44


 


pseudoephedrine AdvReac  Confusion Verified 12/03/22 21:44





[From Claritin-D]     














Physical Exam


Vitals: 


                                   Vital Signs











  Temp Pulse Pulse Resp BP BP Pulse Ox


 


 12/04/22 11:56   98     


 


 12/04/22 11:45   104 H     


 


 12/04/22 08:35  97.9 F   102 H  18   103/61  91 L


 


 12/04/22 08:12   102 H     


 


 12/04/22 07:49   98      95


 


 12/04/22 04:00  97.8 F   98  20   117/73  95


 


 12/04/22 02:00    87  20   


 


 12/04/22 00:41  97 F L   87  20   89/63  94 L


 


 12/03/22 23:13   84   20  103/65   94 L


 


 12/03/22 19:46  98.1 F  101 H   22  157/87   83 L








                                Intake and Output











 12/03/22 12/04/22 12/04/22





 22:59 06:59 14:59


 


Intake Total  60.5 


 


Output Total  550 700


 


Balance  -489.5 -700


 


Intake:   


 


  Intake, IV Titration  60.5 





  Amount   


 


    Heparin Sod,Pork in 0.45%  60.5 





    NaCl 25,000 unit In 0.45   





    % NaCl 1 250ml.bag @ 9.   





    4619 UNITS/KG/HR 10 mls/   





    hr IV .Q24H [facility] Rx#:   





    [id number]   


 


Output:   


 


  Urine  550 700


 


Other:   


 


  Voiding Method  Diaper Diaper





  External Catheter External Catheter


 


  Weight 105.687 kg 108 kg 














Results


CBC & Chem 7: 


                                 12/04/22 05:16





                                 12/04/22 05:16


Labs: 


                  Abnormal Lab Results - Last 24 Hours (Table)











  12/03/22 12/03/22 12/03/22 Range/Units





  20:10 20:10 20:10 


 


WBC  12.9 H    (3.8-10.6)  k/uL


 


RBC  3.67 L    (4.30-5.90)  m/uL


 


Hgb  8.8 L    (13.0-17.5)  gm/dL


 


Hct  31.1 L    (39.0-53.0)  %


 


MCH  23.9 L    (25.0-35.0)  pg


 


MCHC  28.2 L    (31.0-37.0)  g/dL


 


RDW  15.6 H    (11.5-15.5)  %


 


Plt Count  628 H    (150-450)  k/uL


 


Neutrophils #  10.4 H    (1.3-7.7)  k/uL


 


INR     (<1.2)  


 


APTT   31.4 H   (22.0-30.0)  sec


 


Chloride     ()  mmol/L


 


Carbon Dioxide    38 H  (22-30)  mmol/L


 


Creatinine    0.50 L  (0.66-1.25)  mg/dL


 


Glucose    113 H  (74-99)  mg/dL


 


POC Glucose (mg/dL)     ()  mg/dL


 


Calcium    8.0 L  (8.4-10.2)  mg/dL


 


Troponin I     (0.000-0.034)  ng/mL


 


Total Protein    6.1 L  (6.3-8.2)  g/dL


 


Albumin    2.6 L  (3.5-5.0)  g/dL


 


Urine Protein     (Negative)  














  12/03/22 12/03/22 12/03/22 Range/Units





  20:10 21:16 23:41 


 


WBC     (3.8-10.6)  k/uL


 


RBC     (4.30-5.90)  m/uL


 


Hgb     (13.0-17.5)  gm/dL


 


Hct     (39.0-53.0)  %


 


MCH     (25.0-35.0)  pg


 


MCHC     (31.0-37.0)  g/dL


 


RDW     (11.5-15.5)  %


 


Plt Count     (150-450)  k/uL


 


Neutrophils #     (1.3-7.7)  k/uL


 


INR     (<1.2)  


 


APTT     (22.0-30.0)  sec


 


Chloride     ()  mmol/L


 


Carbon Dioxide     (22-30)  mmol/L


 


Creatinine     (0.66-1.25)  mg/dL


 


Glucose     (74-99)  mg/dL


 


POC Glucose (mg/dL)     ()  mg/dL


 


Calcium     (8.4-10.2)  mg/dL


 


Troponin I  0.100 H*   0.099 H*  (0.000-0.034)  ng/mL


 


Total Protein     (6.3-8.2)  g/dL


 


Albumin     (3.5-5.0)  g/dL


 


Urine Protein   Trace H   (Negative)  














  12/04/22 12/04/22 12/04/22 Range/Units





  05:16 05:16 05:16 


 


WBC   13.7 H   (3.8-10.6)  k/uL


 


RBC   3.67 L   (4.30-5.90)  m/uL


 


Hgb   8.8 L   (13.0-17.5)  gm/dL


 


Hct   31.5 L   (39.0-53.0)  %


 


MCH   23.9 L   (25.0-35.0)  pg


 


MCHC   27.8 L   (31.0-37.0)  g/dL


 


RDW     (11.5-15.5)  %


 


Plt Count   545 H   (150-450)  k/uL


 


Neutrophils #   11.9 H   (1.3-7.7)  k/uL


 


INR  1.2 H    (<1.2)  


 


APTT  41.7 H    (22.0-30.0)  sec


 


Chloride     ()  mmol/L


 


Carbon Dioxide     (22-30)  mmol/L


 


Creatinine     (0.66-1.25)  mg/dL


 


Glucose     (74-99)  mg/dL


 


POC Glucose (mg/dL)     ()  mg/dL


 


Calcium     (8.4-10.2)  mg/dL


 


Troponin I    0.088 H*  (0.000-0.034)  ng/mL


 


Total Protein     (6.3-8.2)  g/dL


 


Albumin     (3.5-5.0)  g/dL


 


Urine Protein     (Negative)  














  12/04/22 12/04/22 12/04/22 Range/Units





  05:16 06:27 11:53 


 


WBC     (3.8-10.6)  k/uL


 


RBC     (4.30-5.90)  m/uL


 


Hgb     (13.0-17.5)  gm/dL


 


Hct     (39.0-53.0)  %


 


MCH     (25.0-35.0)  pg


 


MCHC     (31.0-37.0)  g/dL


 


RDW     (11.5-15.5)  %


 


Plt Count     (150-450)  k/uL


 


Neutrophils #     (1.3-7.7)  k/uL


 


INR     (<1.2)  


 


APTT     (22.0-30.0)  sec


 


Chloride  96 L    ()  mmol/L


 


Carbon Dioxide  40 H    (22-30)  mmol/L


 


Creatinine  0.51 L    (0.66-1.25)  mg/dL


 


Glucose  115 H    (74-99)  mg/dL


 


POC Glucose (mg/dL)   113 H  138 H  ()  mg/dL


 


Calcium  7.5 L    (8.4-10.2)  mg/dL


 


Troponin I     (0.000-0.034)  ng/mL


 


Total Protein     (6.3-8.2)  g/dL


 


Albumin     (3.5-5.0)  g/dL


 


Urine Protein     (Negative)  








                      Microbiology - Last 24 Hours (Table)











 12/03/22 21:16 Wound Culture - Preliminary





 Foot - Right 


 


 12/03/22 21:16 Anaerobic Culture - Preliminary





 Foot - Right 














Thrombosis Risk Factor Assmnt





- Choose All That Apply


Any of the Below Risk Factors Present?: Yes


Each Factor Represents 1 point: Abnormal pulmonary function (COPD), Swollen legs

(current)


Other Risk Factors: Yes


Each Risk Factor Represents 2 Points: Age 61-74 years


Thrombosis Risk Factor Assessment Total Risk Factor Score: 4


Thrombosis Risk Factor Assessment Level: Moderate Risk

## 2022-12-04 NOTE — P.CONS
History of Present Illness





- Reason for Consult


Consult date: 12/04/22


Lung cancer


Requesting physician: Leonard Wallace





- Chief Complaint


SOB





- History of Present Illness


Mr. Epps is a very pleasant 61 yo male with history of multiple comorbidities 

including lung cancer on palliative therapy with opdivo, follows with Dr. Eller,

who is here for increasing SOB.  Work up with fluid overload and suggestive of 

systolic heart failure.  Labs with likely reactive leukocytosis, slightly 

decreased Hgb (8's from 9-10's, has been slowly downtrending), and stable 

thrombocytosis, likely reactive.  Admitted with cardiology consult as well as 

diuresis and concern for NSTEMI.  We were consulted due to pt's underlying 

history of lung cancer.  Work up in ED with CT/PE negative for PE, reveals known

left lung mass, as well as LE doppler negative as he was having leg swelling.








Oncologic History:


This is a very nice patient who presented with worsening left shoulder pain 

since September/2021,he had aCXR then a CT scan of chest on 03/23/2021 which 

revealed 6.5x7.7x9cm mass in DEVEN of his lung,destruction of first third and 

second ribs,extending into supraclavicual area and axilla,inseparable from left 

subclavian vein.


On 04/01/2022,PET scan revealed revealed suspicious uptake in the above 

mentioned mass,no other metastatic disease.





On 04/15/2022,brain MRI was negative for metastatic disease..





On 04/27/2022,CT guided lung biopsy was positive for invasive squamous cell 

carcinoma.





On 05/04/2022,he started weekly carboplatin/taxol with XRT.





On 08/01/2022,repeat CT scan of chest/abdomen/pelvis  revealed new bilateral new

lung nodules up to 1.9 cm





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 

mutation,high TMB.





He feels tired,has chronic legs sores from diabetes,his shoulder pain is much 

better but he has left elbow pain,seen by ortho,had an MRI of elbow,was told it 

was not cancer,however,he was referred to ortho/onc,his neuropathy in feet is 

stable from his baseline,his legs edema is slightly better,his activities 

limited to wheel chair (baseline prior to Dx with lungcancer),his PS is stable


I had a discussion with the patient and his wife.


Reviewed molecular studies results with them





He started opdivo,every 2 weeks schedule, as second line on September/22/2022.





On 10/17/2022,he had a repeat CT scan of chest to follow up on left chest wall 

mass,revealed some increase in bilateral lung nodules and RLL lesion


10/2022 IP for chest wall pain and erythema due to cellulitis, which was treated

and improved.





Last seen on 11/15/22, at which time he was doing better.  He feels a little 

better today than last visit,remains weak,the swelling in right chest wall 

improved,continues to have significant pain in left elbow and unable to fully 

use left arm,he is currently on MS contin 30 mg TID and Lyrica TID,as prescribed

by Dr Arora,continues to have significant pain,has chronic leg edema and 

ulceration,no dyspnea,no cough,his appetite is fine,no diarrhea.  CT planned 

prior to follow up visit, and continue opdivo.





Last opdivo was due 12/1/22.








Past Medical History


Past Medical History: Asthma, Cancer, Heart Failure, COPD, Diabetes Mellitus, 

Hypertension


Additional Past Medical History / Comment(s): Uses home O2 as needed only. Sq

uamous cell carcinoma of the lung, rib lesion and lymph adenopathy, shoulder 

pain related to lung mass. Pancous tumor left apex


History of Any Multi-Drug Resistant Organisms: MRSA


Year Discovered:: 6/23/16


MDRO Source:: Left Foot


Past Surgical History: Hernia Repair, Orthopedic Surgery, Tonsillectomy


Additional Past Surgical History / Comment(s): lt. 5th toe amp


Past Anesthesia/Blood Transfusion Reactions: No Reported Reaction


Past Psychological History: Anxiety


Smoking Status: Former smoker


Past Alcohol Use History: None Reported


Past Drug Use History: None Reported


Additional Drug Use History / Comment(s): quit drinking 10 years ago in 2012, 

quit smoking Sept 2021





- Past Family History


  ** Sister(s)


Family Medical History: Cancer





  ** Father


Family Medical History: Congestive Heart Failure (CHF), Diabetes Mellitus





  ** Mother


Family Medical History: Renal Disease





Medications and Allergies


                                Home Medications











 Medication  Instructions  Recorded  Confirmed  Type


 


Albuterol Sulfate [Ventolin HFA] 2 puff INHALATION RT-Q4H PRN 09/03/21 12/03/22 

History


 


Morphine Sulfate [Ms Contin] 30 mg PO TID 04/18/22 12/03/22 History


 


ondansetron HCL [Zofran] 8 mg PO TID PRN 09/17/22 12/03/22 History


 


Loratadine [Claritin] 10 mg PO DAILY #30 tab 09/18/22 12/03/22 Rx


 


Furosemide [Lasix] 20 mg PO DAILY PRN 10/12/22 12/03/22 History


 


Pregabalin [Lyrica] 200 mg PO BID 10/12/22 12/03/22 History


 


Cephalexin [Keflex] 500 mg PO QID 12/03/22 12/03/22 History


 


Ferrous Sulfate [Feosol] 325 mg PO DAILY 12/03/22 12/03/22 History


 


Ipratropium-Albuterol Nebulize 3 ml INHALATION RT-QID PRN 12/03/22 12/03/22 

History





[Duoneb 0.5 mg-3 mg/3 ml Soln]    


 


Multivitamins, Thera [Multivitamin 1 tab PO DAILY 12/03/22 12/03/22 History





(formulary)]    








                                    Allergies











Allergy/AdvReac Type Severity Reaction Status Date / Time


 


loratadine [From Claritin-D] AdvReac  Confusion Verified 12/03/22 21:44


 


pseudoephedrine AdvReac  Confusion Verified 12/03/22 21:44





[From Claritin-D]     














Physical Exam


Vitals: 


                                   Vital Signs











  Temp Pulse Pulse Resp BP BP Pulse Ox


 


 12/04/22 11:56   98     


 


 12/04/22 11:45   104 H     


 


 12/04/22 08:35  97.9 F   102 H  18   103/61  91 L


 


 12/04/22 08:12   102 H     


 


 12/04/22 07:49   98      95


 


 12/04/22 04:00  97.8 F   98  20   117/73  95


 


 12/04/22 02:00    87  20   


 


 12/04/22 00:41  97 F L   87  20   89/63  94 L


 


 12/03/22 23:13   84   20  103/65   94 L


 


 12/03/22 19:46  98.1 F  101 H   22  157/87   83 L








                                Intake and Output











 12/03/22 12/04/22 12/04/22





 22:59 06:59 14:59


 


Intake Total  60.5 


 


Output Total  550 700


 


Balance  -489.5 -700


 


Intake:   


 


  Intake, IV Titration  60.5 





  Amount   


 


    Heparin Sod,Pork in 0.45%  60.5 





    NaCl 25,000 unit In 0.45   





    % NaCl 1 250ml.bag @ 9.   





    4619 UNITS/KG/HR 10 mls/   





    hr IV .Q24H [facility] Rx#:   





    [id number]   


 


Output:   


 


  Urine  550 700


 


Other:   


 


  Voiding Method  Diaper Diaper





  External Catheter External Catheter


 


  Weight 105.687 kg 108 kg 














Gen.: No acute distress.


HEENT: Mucosa moist.


Lungs: No respiratory distress.


Heart: Regular rate.  Significant bilateral lower extremity edema with erythema 

of his lower calves bilaterally.


Abdomen: Soft.


Neuro: Alert and oriented 3.


Skin: No jaundice.


Psych: Appropriate affect.








Results


CBC & Chem 7: 


                                 12/04/22 05:16





                                 12/04/22 05:16


Labs: 


                  Abnormal Lab Results - Last 24 Hours (Table)











  12/03/22 12/03/22 12/03/22 Range/Units





  20:10 20:10 20:10 


 


WBC  12.9 H    (3.8-10.6)  k/uL


 


RBC  3.67 L    (4.30-5.90)  m/uL


 


Hgb  8.8 L    (13.0-17.5)  gm/dL


 


Hct  31.1 L    (39.0-53.0)  %


 


MCH  23.9 L    (25.0-35.0)  pg


 


MCHC  28.2 L    (31.0-37.0)  g/dL


 


RDW  15.6 H    (11.5-15.5)  %


 


Plt Count  628 H    (150-450)  k/uL


 


Neutrophils #  10.4 H    (1.3-7.7)  k/uL


 


INR     (<1.2)  


 


APTT   31.4 H   (22.0-30.0)  sec


 


Chloride     ()  mmol/L


 


Carbon Dioxide    38 H  (22-30)  mmol/L


 


Creatinine    0.50 L  (0.66-1.25)  mg/dL


 


Glucose    113 H  (74-99)  mg/dL


 


POC Glucose (mg/dL)     ()  mg/dL


 


Calcium    8.0 L  (8.4-10.2)  mg/dL


 


Troponin I     (0.000-0.034)  ng/mL


 


Total Protein    6.1 L  (6.3-8.2)  g/dL


 


Albumin    2.6 L  (3.5-5.0)  g/dL


 


Urine Protein     (Negative)  














  12/03/22 12/03/22 12/03/22 Range/Units





  20:10 21:16 23:41 


 


WBC     (3.8-10.6)  k/uL


 


RBC     (4.30-5.90)  m/uL


 


Hgb     (13.0-17.5)  gm/dL


 


Hct     (39.0-53.0)  %


 


MCH     (25.0-35.0)  pg


 


MCHC     (31.0-37.0)  g/dL


 


RDW     (11.5-15.5)  %


 


Plt Count     (150-450)  k/uL


 


Neutrophils #     (1.3-7.7)  k/uL


 


INR     (<1.2)  


 


APTT     (22.0-30.0)  sec


 


Chloride     ()  mmol/L


 


Carbon Dioxide     (22-30)  mmol/L


 


Creatinine     (0.66-1.25)  mg/dL


 


Glucose     (74-99)  mg/dL


 


POC Glucose (mg/dL)     ()  mg/dL


 


Calcium     (8.4-10.2)  mg/dL


 


Troponin I  0.100 H*   0.099 H*  (0.000-0.034)  ng/mL


 


Total Protein     (6.3-8.2)  g/dL


 


Albumin     (3.5-5.0)  g/dL


 


Urine Protein   Trace H   (Negative)  














  12/04/22 12/04/22 12/04/22 Range/Units





  05:16 05:16 05:16 


 


WBC   13.7 H   (3.8-10.6)  k/uL


 


RBC   3.67 L   (4.30-5.90)  m/uL


 


Hgb   8.8 L   (13.0-17.5)  gm/dL


 


Hct   31.5 L   (39.0-53.0)  %


 


MCH   23.9 L   (25.0-35.0)  pg


 


MCHC   27.8 L   (31.0-37.0)  g/dL


 


RDW     (11.5-15.5)  %


 


Plt Count   545 H   (150-450)  k/uL


 


Neutrophils #   11.9 H   (1.3-7.7)  k/uL


 


INR  1.2 H    (<1.2)  


 


APTT  41.7 H    (22.0-30.0)  sec


 


Chloride     ()  mmol/L


 


Carbon Dioxide     (22-30)  mmol/L


 


Creatinine     (0.66-1.25)  mg/dL


 


Glucose     (74-99)  mg/dL


 


POC Glucose (mg/dL)     ()  mg/dL


 


Calcium     (8.4-10.2)  mg/dL


 


Troponin I    0.088 H*  (0.000-0.034)  ng/mL


 


Total Protein     (6.3-8.2)  g/dL


 


Albumin     (3.5-5.0)  g/dL


 


Urine Protein     (Negative)  














  12/04/22 12/04/22 12/04/22 Range/Units





  05:16 06:27 11:53 


 


WBC     (3.8-10.6)  k/uL


 


RBC     (4.30-5.90)  m/uL


 


Hgb     (13.0-17.5)  gm/dL


 


Hct     (39.0-53.0)  %


 


MCH     (25.0-35.0)  pg


 


MCHC     (31.0-37.0)  g/dL


 


RDW     (11.5-15.5)  %


 


Plt Count     (150-450)  k/uL


 


Neutrophils #     (1.3-7.7)  k/uL


 


INR     (<1.2)  


 


APTT     (22.0-30.0)  sec


 


Chloride  96 L    ()  mmol/L


 


Carbon Dioxide  40 H    (22-30)  mmol/L


 


Creatinine  0.51 L    (0.66-1.25)  mg/dL


 


Glucose  115 H    (74-99)  mg/dL


 


POC Glucose (mg/dL)   113 H  138 H  ()  mg/dL


 


Calcium  7.5 L    (8.4-10.2)  mg/dL


 


Troponin I     (0.000-0.034)  ng/mL


 


Total Protein     (6.3-8.2)  g/dL


 


Albumin     (3.5-5.0)  g/dL


 


Urine Protein     (Negative)  








                      Microbiology - Last 24 Hours (Table)











 12/03/22 21:16 Wound Culture - Preliminary





 Foot - Right 


 


 12/03/22 21:16 Anaerobic Culture - Preliminary





 Foot - Right 











Chest x-ray: report reviewed


CT scan - chest: report reviewed


Venous US: report reviewed





Assessment and Plan


Assessment: 





1. Heart failure, decompensated systolic


2. Metastatic lung cancer, bilateral lung met's, on immunotherapy


3. Increased troponin, possibly NSTEMI


4. Acute on chronic anemia


5. Leukocytosis


6. Thrombocytosis





Plan: 





Mr. Epps is a very pleasant 61 yo male with history of multiple comorbidities 

including metastatic lung cancer, bilateral pulmonary met's found soon after 

completing chemoRT for stage III disease, on palliative opdivo and follows with 

 and Dr. Eller, here for increasing SOB and leg swelling, found to have 

increased trop and fluid overload, negative imaging for PE/LE DVT.  being 

treated for CHF exacerbation and NSTEMI.  





Will hold immunotherapy while pt acute ill and IP.  Next dose of opdivo due on 

12/15/22.  If pt improves and is discharged by then, can resume therapy as 

planned.  Otherwise, he has acute on chronic anemia, new leukocytosis and stable

thrombocytosis over past couple months.  I suspect his anemia is due to chronic 

disease, possibly lower than baseline due to dilutional blood from CHF, and 

leukocytosis and thrombocytosis likely reactive.  Will complete work up however 

to rule out other contributing etiology.





Discussed with pt and he is agreeable to the plan.  All questions answered.

## 2022-12-04 NOTE — CONS
CONSULTATION



HISTORY OF PRESENT ILLNESS:

Crys is a 62-year-old gentleman with history of congestive heart failure, lung cancer,

COPD, asthma, and diabetes who presented to hospital with worsening shortness of

breath, leg edema, due to which he was admitted to hospital, had a troponin done that

came back slightly elevated due to which Cardiology had been consulted.  He is

currently receiving immunotherapy.  The patient recently had a CT scan of the chest.

The patient's BNP is elevated at 4680 and the troponin is 0.1, 0.1, 0.1.  An EKG shows

sinus rhythm with frequent PVCs and poor R-wave progression.  CT scan of the brain was

unremarkable.  A chest CT did not reveal any evidence of pulmonary embolism.  A venous

Doppler study was negative for DVT.



PAST MEDICAL HISTORY:

Significant for lung cancer, currently on chemotherapy.



MEDICATIONS AT HOME:

1. Zofran.

2. Lyrica.

3. MS Contin.

4. Iron.

5. Ventolin.

6. DuoNeb.

7. Lasix.



ALLERGIES:

To Claritin and Sudafed.



FAMILY HISTORY:

Negative for premature coronary artery disease.



SOCIAL HISTORY:

There is no current history of smoking, EtOH or drug abuse.



REVIEW OF SYSTEMS:

HEENT:  Unremarkable.

CARDIAC:  As described above.

RESPIRATORY:  As described above.

GI:  Negative.

GENITOURINARY:  Negative.

ALLERGY/IMMUNOLOGY:  Negative.

SKIN:  Negative.

MUSCULOSKELETAL:  Negative.

ENDOCRINE:  Negative.

DERM:  Negative.

CONSTITUTIONAL:  Negative.

ONCOLOGICAL:  Negative.

CNS:  Negative.



Rest of the system review is not relevant.



PHYSICAL EXAMINATION:

GENERAL:  The patient is receiving breathing treatments but comfortable at rest.

VITAL SIGNS:  Heart rate is 98 beats per minute, blood pressure is 117/73, respiratory

rate is 18, and O2 saturation is 95% on 2 L.

NECK:  There is no jugular venous distention.

CHEST:  Reveals occasional rhonchi bilaterally.

HEART:  Reveals first and second heart sounds.  Systolic murmur at the apex.

ABDOMEN:  Soft.

EXTREMITIES:  Reveals bilateral pitting edema.



LABORATORY DATA:

Show a potassium of 3.6, creatinine is 0.5, troponins are mildly elevated.  BNP is

elevated.  Hemoglobin is low at 8.8, platelet count is 545.



ASSESSMENT AND PLAN:

1. Acute onset congestive heart failure.

2. Chronic obstructive pulmonary disease exacerbation.

3. Mild troponin elevation of unclear clinical significance in a patient with lung

    carcinoma and recent immunotherapy.

I discussed the case with the ER physician when he first presented to the ER.  I am

going to obtain an echocardiogram to evaluate LV function and wall motion.  I do not

believe the patient had an acute coronary syndrome.  Chest x-ray and clinical

presentation is certainly consistent with congestive heart failure and will treat the

patient with diuretics and blood pressure tolerating, I will add a small dose of a beta

blocker at this time and might consider adding an ACE inhibitor.





MMELROY / IJN: [id number] / Job#: [id number]

## 2022-12-04 NOTE — CA
Transthoracic Echo Report 

 Name: Crys Epps 

 MRN:    [id number] 

 Age:    62     Gender:     M 

 

 :    1960 

 Exam Date:     2022 11:58 

 Exam Location: [facility] 

 Ht (in):     72     Wt (lb):     238 

 Ordering Physician:        Hua Samaniego MD 

 Attending/Referring Phys: 

 Technician         Angy Stearns RDCS 

 Procedure CPT: 

 Indications:       chf, nstemi 

 

 Cardiac Hx: 

 Technical Quality:      Very technically difficult study 

 Contrast 1:    Lumason                     Total Dose (mL):      3 

 Contrast 2:                                Total Dose (mL): 

 

 MEASUREMENTS  (Male / Female) Normal Values 

 

 DOPPLER 

 AV Peak Velocity                  304.1 cm/s             

 AV Peak Gradient                  37.0 mmHg              

 AV Mean Velocity                  221.2 cm/s             

 AV Mean Gradient                  22.1 mmHg              

 AV Velocity Time Integral         57.2 cm                

 LVOT Peak Velocity                131.8 cm/s             

 LVOT Peak Gradient                7.0 mmHg               

 MV Peak Velocity                  170.2 cm/s             

 MV Peak Gradient                  11.6 mmHg              

 MV Mean Velocity                  109.9 cm/s             

 MV Mean Gradient                  5.5 mmHg               

 MV Velocity Time Integral         36.5 cm                

 

 

 FINDINGS 

 Left Ventricle 

 Normal Left ventricular size, wall thickness, systolic function with no obvious  

 regional wall motion abnormalities. 

 

 Right Ventricle 

 Normal right ventricular size and function. 

 

 Right Atrium 

 Right atrium not well visualized. 

 

 Left Atrium 

 Left atrium not well visualized. 

 

 Mitral Valve 

 Mitral valve not well visualized. Mild mitral stenosis. mitral valve peak  

 gradient 11.6mmHg and mean gradient of  5.5mmHg. 

 

 Aortic Valve 

 Aortic valve not well visualized. Moderate aortic stenosis with a peak gradient  

 of 37mmHg and a mean gradient of 22 mmHg. 

 

 Tricuspid Valve 

 Tricuspid valve not well visualized. 

 

 Pulmonic Valve 

 Pulmonic valve not well visualized. 

 

 Pericardium 

 Normal pericardium. 

 

 Aorta 

 Aortic root and proximal ascending aorta not well visualized. 

 

 CONCLUSIONS 

 Normal LV function 

 Moderate aortic stenosis 

 Previewed by:  

 Dr. Joe Joya MD 

 (Electronically Signed) 

 Final Date:      2022 16:02

## 2022-12-04 NOTE — CT
EXAMINATION TYPE: CT brain wo con

 

DATE OF EXAM: 12/4/2022

 

COMPARISON: 9/13/2022

 

HISTORY: Altered mental status

 

CT DLP: 1179.4 mGycm

Automated exposure control for dose reduction was used.

 

There is mild cerebral atrophy. There is no mass effect nor midline shift. No sign of intracranial he
morrhage. Calvarium is intact. The skull base is intact. There is normal aeration of the mastoid sinu
ses.

 

IMPRESSION:

Mild atrophy. No acute intracranial abnormality. No change.

## 2022-12-05 LAB
ALBUMIN SERPL-MCNC: 2.3 G/DL (ref 3.5–5)
ALP SERPL-CCNC: 100 U/L (ref 38–126)
ALT SERPL-CCNC: 15 U/L (ref 4–49)
ANION GAP SERPL CALC-SCNC: 4 MMOL/L
AST SERPL-CCNC: 22 U/L (ref 17–59)
BUN SERPL-SCNC: 12 MG/DL (ref 9–20)
CALCIUM SPEC-MCNC: 7.5 MG/DL (ref 8.4–10.2)
CHLORIDE SERPL-SCNC: 93 MMOL/L (ref 98–107)
CO2 SERPL-SCNC: 42 MMOL/L (ref 22–30)
ERYTHROCYTE [DISTWIDTH] IN BLOOD BY AUTOMATED COUNT: 3.73 M/UL (ref 4.3–5.9)
ERYTHROCYTE [DISTWIDTH] IN BLOOD: 15.3 % (ref 11.5–15.5)
GLUCOSE BLD-MCNC: 113 MG/DL (ref 70–110)
GLUCOSE BLD-MCNC: 118 MG/DL (ref 70–110)
GLUCOSE BLD-MCNC: 118 MG/DL (ref 70–110)
GLUCOSE BLD-MCNC: 133 MG/DL (ref 70–110)
GLUCOSE SERPL-MCNC: 118 MG/DL (ref 74–99)
HCT VFR BLD AUTO: 32.1 % (ref 39–53)
HGB BLD-MCNC: 8.7 GM/DL (ref 13–17.5)
MCH RBC QN AUTO: 23.4 PG (ref 25–35)
MCHC RBC AUTO-ENTMCNC: 27.3 G/DL (ref 31–37)
MCV RBC AUTO: 85.9 FL (ref 80–100)
PLATELET # BLD AUTO: 536 K/UL (ref 150–450)
POTASSIUM SERPL-SCNC: 3.5 MMOL/L (ref 3.5–5.1)
PROT SERPL-MCNC: 5.5 G/DL (ref 6.3–8.2)
SODIUM SERPL-SCNC: 139 MMOL/L (ref 137–145)
WBC # BLD AUTO: 13.4 K/UL (ref 3.8–10.6)

## 2022-12-05 RX ADMIN — HEPARIN SODIUM SCH UNIT: 5000 INJECTION INTRAVENOUS; SUBCUTANEOUS at 23:01

## 2022-12-05 RX ADMIN — HEPARIN SODIUM SCH UNIT: 5000 INJECTION INTRAVENOUS; SUBCUTANEOUS at 17:40

## 2022-12-05 RX ADMIN — IPRATROPIUM BROMIDE AND ALBUTEROL SULFATE PRN ML: .5; 3 SOLUTION RESPIRATORY (INHALATION) at 07:54

## 2022-12-05 RX ADMIN — DOXYCYCLINE SCH MG: 100 CAPSULE ORAL at 20:19

## 2022-12-05 RX ADMIN — COLLAGENASE SANTYL SCH APPLIC: 250 OINTMENT TOPICAL at 14:00

## 2022-12-05 RX ADMIN — INSULIN ASPART SCH: 100 INJECTION, SOLUTION INTRAVENOUS; SUBCUTANEOUS at 12:04

## 2022-12-05 RX ADMIN — LORATADINE SCH MG: 10 TABLET ORAL at 10:09

## 2022-12-05 RX ADMIN — MORPHINE SULFATE SCH MG: 30 TABLET, FILM COATED, EXTENDED RELEASE ORAL at 10:09

## 2022-12-05 RX ADMIN — CEPHALEXIN SCH MG: 500 CAPSULE ORAL at 13:36

## 2022-12-05 RX ADMIN — MORPHINE SULFATE SCH MG: 30 TABLET, FILM COATED, EXTENDED RELEASE ORAL at 20:19

## 2022-12-05 RX ADMIN — FAMOTIDINE SCH MG: 10 INJECTION, SOLUTION INTRAVENOUS at 20:20

## 2022-12-05 RX ADMIN — HEPARIN SODIUM SCH UNIT: 5000 INJECTION INTRAVENOUS; SUBCUTANEOUS at 10:11

## 2022-12-05 RX ADMIN — FUROSEMIDE SCH MG: 10 INJECTION, SOLUTION INTRAMUSCULAR; INTRAVENOUS at 20:20

## 2022-12-05 RX ADMIN — METOPROLOL TARTRATE SCH: 25 TABLET, FILM COATED ORAL at 19:01

## 2022-12-05 RX ADMIN — IPRATROPIUM BROMIDE AND ALBUTEROL SULFATE PRN ML: .5; 3 SOLUTION RESPIRATORY (INHALATION) at 11:33

## 2022-12-05 RX ADMIN — IPRATROPIUM BROMIDE AND ALBUTEROL SULFATE PRN ML: .5; 3 SOLUTION RESPIRATORY (INHALATION) at 15:20

## 2022-12-05 RX ADMIN — Medication SCH MG: at 10:11

## 2022-12-05 RX ADMIN — FUROSEMIDE SCH MG: 10 INJECTION, SOLUTION INTRAMUSCULAR; INTRAVENOUS at 10:09

## 2022-12-05 RX ADMIN — PREGABALIN SCH MG: 100 CAPSULE ORAL at 10:11

## 2022-12-05 RX ADMIN — METOPROLOL TARTRATE SCH MG: 25 TABLET, FILM COATED ORAL at 20:19

## 2022-12-05 RX ADMIN — MORPHINE SULFATE SCH MG: 30 TABLET, FILM COATED, EXTENDED RELEASE ORAL at 17:40

## 2022-12-05 RX ADMIN — INSULIN ASPART SCH: 100 INJECTION, SOLUTION INTRAVENOUS; SUBCUTANEOUS at 17:29

## 2022-12-05 RX ADMIN — INSULIN ASPART SCH: 100 INJECTION, SOLUTION INTRAVENOUS; SUBCUTANEOUS at 20:15

## 2022-12-05 RX ADMIN — INSULIN ASPART SCH: 100 INJECTION, SOLUTION INTRAVENOUS; SUBCUTANEOUS at 06:42

## 2022-12-05 RX ADMIN — CEPHALEXIN SCH MG: 500 CAPSULE ORAL at 10:11

## 2022-12-05 RX ADMIN — FAMOTIDINE SCH MG: 10 INJECTION, SOLUTION INTRAVENOUS at 10:09

## 2022-12-05 RX ADMIN — IPRATROPIUM BROMIDE AND ALBUTEROL SULFATE PRN ML: .5; 3 SOLUTION RESPIRATORY (INHALATION) at 19:55

## 2022-12-05 RX ADMIN — PREGABALIN SCH MG: 100 CAPSULE ORAL at 20:19

## 2022-12-05 RX ADMIN — CEPHALEXIN SCH MG: 500 CAPSULE ORAL at 17:39

## 2022-12-05 NOTE — P.PN
Subjective


This is a 62-year-old male with a past medical history of lung cancer on 

palliative therapy follows with Dr. Eller, asthma, type 2 diabetes, hyperten

dada, COPD, former tobacco use.  He does not follow with a cardiologist.  We are

consulted for elevated troponin.  Patient presents emergency department with 

worsening shortness of breath and dyspnea on exertion and lower extremity edema.

There was concern for congestive heart failure and patient was started on IV 

Lasix.  He underwent an echocardiogram that revealed normal LV systolic 

function, moderate aortic stenosis with a peak gradient of 37 mmHg, mean 

gradient of 22 mmHg, mild mitral stenosis.








Patient seen and examined at bedside, no acute distress. /61 .  He 

continues to be on IV Lasix.  Patient with 1.1 L urine output over the past 24 

hours.  Also being treated for an acute COPD exacerbation.





GENERAL: In no acute distress.


NECK: Supple without JVD or thyromegaly.


LUNGS: Breath sounds crackles in bases to auscultation bilaterally. Respiration 

equal and unlabored.  No wheezes, rales or rhonchi.


HEART: Regular rate and rhythm without murmurs, rubs or gallops. S1 and S2 

heard.


EXTREMITIES: Normal range of motion, 2+bilateral lower extremity edema.  No 

clubbing or cyanosis. Peripheral pulses intact.





ASSESSMENT


Acute on chronic heart failure with preserved ejection fraction


COPD exacerbation


Mildly elevated troponin, unclear significance


Lung carcinoma, on immunotherapy, metastatic 


Hypertension


COPD


Former tobacco use


Type 2 diabetes


Nonhealing ulceration of right foot


Diabetic foot ulcer





PLAN


Continue IV Lasix for additional 24 hours


Monitor I/Os, daily weights, renal function and electrolytes


Increase metoprolol tartrate 25mg BID


Further recommendations based on clinical course





Nurse Practitioner note has been reviewed, I agree with a documented findings 

and plan of care.  Patient was seen and examined.





Objective





- Vital Signs


Vital signs: 


                                   Vital Signs











Temp  99.3 F   12/05/22 10:08


 


Pulse  103 H  12/05/22 11:49


 


Resp  22   12/05/22 10:08


 


BP  100/61   12/05/22 10:08


 


Pulse Ox  93 L  12/05/22 10:08


 


FiO2      








                                 Intake & Output











 12/04/22 12/05/22 12/05/22





 18:59 06:59 18:59


 


Intake Total  240 118


 


Output Total 700 650 725


 


Balance -700 410 -089


 


Weight  106 kg 


 


Intake:   


 


  Oral  240 118


 


Output:   


 


  Urine 700 650 725


 


Other:   


 


  Voiding Method Diaper Diaper Diaper


 


  # Voids 1 2 














- Labs


CBC & Chem 7: 


                                 12/05/22 10:27





                                 12/05/22 10:27


Labs: 


                  Abnormal Lab Results - Last 24 Hours (Table)











  12/04/22 12/04/22 12/04/22 Range/Units





  05:16 05:16 16:42 


 


WBC     (3.8-10.6)  k/uL


 


RBC     (4.30-5.90)  m/uL


 


Hgb     (13.0-17.5)  gm/dL


 


Hct     (39.0-53.0)  %


 


MCH     (25.0-35.0)  pg


 


MCHC     (31.0-37.0)  g/dL


 


Plt Count     (150-450)  k/uL


 


Chloride     ()  mmol/L


 


Carbon Dioxide     (22-30)  mmol/L


 


Creatinine     (0.66-1.25)  mg/dL


 


Glucose     (74-99)  mg/dL


 


POC Glucose (mg/dL)    140 H  ()  mg/dL


 


Hemoglobin A1c   6.1 H   (0.0-6.0)  %


 


Calcium     (8.4-10.2)  mg/dL


 


Iron  15 L    ()  ug/dL


 


Lactate Dehydrogenase  857 H    (313-618)  U/L


 


Total Protein     (6.3-8.2)  g/dL


 


Albumin     (3.5-5.0)  g/dL














  12/04/22 12/05/22 12/05/22 Range/Units





  20:25 06:34 10:27 


 


WBC    13.4 H  (3.8-10.6)  k/uL


 


RBC    3.73 L  (4.30-5.90)  m/uL


 


Hgb    8.7 L  (13.0-17.5)  gm/dL


 


Hct    32.1 L  (39.0-53.0)  %


 


MCH    23.4 L  (25.0-35.0)  pg


 


MCHC    27.3 L  (31.0-37.0)  g/dL


 


Plt Count    536 H  (150-450)  k/uL


 


Chloride     ()  mmol/L


 


Carbon Dioxide     (22-30)  mmol/L


 


Creatinine     (0.66-1.25)  mg/dL


 


Glucose     (74-99)  mg/dL


 


POC Glucose (mg/dL)  140 H  113 H   ()  mg/dL


 


Hemoglobin A1c     (0.0-6.0)  %


 


Calcium     (8.4-10.2)  mg/dL


 


Iron     ()  ug/dL


 


Lactate Dehydrogenase     (313-618)  U/L


 


Total Protein     (6.3-8.2)  g/dL


 


Albumin     (3.5-5.0)  g/dL














  12/05/22 12/05/22 Range/Units





  10:27 11:44 


 


WBC    (3.8-10.6)  k/uL


 


RBC    (4.30-5.90)  m/uL


 


Hgb    (13.0-17.5)  gm/dL


 


Hct    (39.0-53.0)  %


 


MCH    (25.0-35.0)  pg


 


MCHC    (31.0-37.0)  g/dL


 


Plt Count    (150-450)  k/uL


 


Chloride  93 L   ()  mmol/L


 


Carbon Dioxide  42 H*   (22-30)  mmol/L


 


Creatinine  0.51 L   (0.66-1.25)  mg/dL


 


Glucose  118 H   (74-99)  mg/dL


 


POC Glucose (mg/dL)   118 H  ()  mg/dL


 


Hemoglobin A1c    (0.0-6.0)  %


 


Calcium  7.5 L   (8.4-10.2)  mg/dL


 


Iron    ()  ug/dL


 


Lactate Dehydrogenase    (313-618)  U/L


 


Total Protein  5.5 L   (6.3-8.2)  g/dL


 


Albumin  2.3 L   (3.5-5.0)  g/dL








                      Microbiology - Last 24 Hours (Table)











 12/03/22 21:16 Gram Stain - Preliminary





 Foot - Right Wound Culture - Preliminary





    Presumptive Staph aureus


 


 12/03/22 21:16 Anaerobic Culture - Preliminary





 Foot - Right

## 2022-12-05 NOTE — P.PN
Subjective





History of present illness; patient is a 62-year-old gentleman with past medical

history significant for lung cancer on immunotherapy, congestive heart Failure, 

COPD, asthma who presented to the ER because of worsening shortness of breath 

and swelling of legs.  Patient has been noticing increasing shortness of breath 

on exertion for the last couple of days associated with increased swelling of 

legs.  Patient was on diuretics at home but stated that it was not working and 

he continues to be short of breath.  Patient also currently on antibiotics for 

right foot wound.  Because of this worsening shortness of breath and swelling of

legs patient came to the ER.  Initial lab work showed patient to have a white 

count of 12.9, hemoglobin of 8.8, platelet count of 628, sodium 137, potassium 

4.3, carbon dioxide 38, troponin 0.1.  CTA chest done showed no evidence of 

pulmonary embolism.  It did show pleural thickening and destructive changes in 

the left upper ribs.  Large mass involving the upper limits of chest wall was 

also seen.  Patient was admitted to hospitalist service for further evaluation 

and treatment





12/05/2022


Patient still complaining of from dyspnea he says the same but no chest pain and

no significant cough and


He has chronic left shoulder pain and swelling of the left upper extremity 

related to his cancer


Also he has evidence of right foot infection with no evidence of cellulitis, he 

has diabetic foot ulcer on the sole with black eschar


He is in 2 L oxygen via nasal cannula


Oncology team are on the case and the recommend to resume immunotherapy on 12/15

if he is stable, no recent chemotherapy





Objective





- Vital Signs


Vital signs: 


                                   Vital Signs











Temp  98.7 F   12/05/22 13:30


 


Pulse  93   12/05/22 15:31


 


Resp  20   12/05/22 14:00


 


BP  96/56   12/05/22 13:30


 


Pulse Ox  95   12/05/22 13:30


 


FiO2      








                                 Intake & Output











 12/04/22 12/05/22 12/05/22





 18:59 06:59 18:59


 


Intake Total  240 298


 


Output Total 700 650 725


 


Balance -700 410 -427


 


Weight  106 kg 106 kg


 


Intake:   


 


  Oral  240 298


 


Output:   


 


  Urine 700 650 725


 


Other:   


 


  Voiding Method Diaper Diaper Diaper


 


  # Voids 1 2 














- Exam





GENERAL: The patient is alert and oriented x3, not in any acute distress. Well 

developed, well nourished. 


HEENT: Pupils are round and equally reacting to light. EOMI. No scleral icterus.

No conjunctival pallor. Normocephalic, atraumatic. No pharyngeal erythema. No 

thyromegaly. 


CARDIOVASCULAR: S1 and S2 present. No murmurs, rubs, or gallops. 


-PULMONARY: Chest is clear to auscultation, no wheezing .  Bilateral basal or 

crackles. 


ABDOMEN: Soft, nontender, nondistended, normoactive bowel sounds. No palpable 

organomegaly. 


MUSCULOSKELETAL: No joint swelling or deformity. 


-EXTREMITIES: No cyanosis, clubbing, or pedal mild B/L Castillo edema.  Left upper 

extremities as well as with limitation of movement [chronic] right foot ulcer on

 the sole with black eschar on the top, no surrounding cellulitis


NEUROLOGICAL: Gross neurological examination did not reveal any focal deficits. 


SKIN: No rashes. no petechiae.





- Labs


CBC & Chem 7: 


                                 12/05/22 10:27





                                 12/05/22 10:27


Labs: 


                  Abnormal Lab Results - Last 24 Hours (Table)











  12/04/22 12/04/22 12/04/22 Range/Units





  05:16 05:16 16:42 


 


WBC     (3.8-10.6)  k/uL


 


RBC     (4.30-5.90)  m/uL


 


Hgb     (13.0-17.5)  gm/dL


 


Hct     (39.0-53.0)  %


 


MCH     (25.0-35.0)  pg


 


MCHC     (31.0-37.0)  g/dL


 


Plt Count     (150-450)  k/uL


 


Chloride     ()  mmol/L


 


Carbon Dioxide     (22-30)  mmol/L


 


Creatinine     (0.66-1.25)  mg/dL


 


Glucose     (74-99)  mg/dL


 


POC Glucose (mg/dL)    140 H  ()  mg/dL


 


Hemoglobin A1c   6.1 H   (0.0-6.0)  %


 


Calcium     (8.4-10.2)  mg/dL


 


Iron  15 L    ()  ug/dL


 


Total Protein     (6.3-8.2)  g/dL


 


Albumin     (3.5-5.0)  g/dL














  12/04/22 12/05/22 12/05/22 Range/Units





  20:25 06:34 10:27 


 


WBC    13.4 H  (3.8-10.6)  k/uL


 


RBC    3.73 L  (4.30-5.90)  m/uL


 


Hgb    8.7 L  (13.0-17.5)  gm/dL


 


Hct    32.1 L  (39.0-53.0)  %


 


MCH    23.4 L  (25.0-35.0)  pg


 


MCHC    27.3 L  (31.0-37.0)  g/dL


 


Plt Count    536 H  (150-450)  k/uL


 


Chloride     ()  mmol/L


 


Carbon Dioxide     (22-30)  mmol/L


 


Creatinine     (0.66-1.25)  mg/dL


 


Glucose     (74-99)  mg/dL


 


POC Glucose (mg/dL)  140 H  113 H   ()  mg/dL


 


Hemoglobin A1c     (0.0-6.0)  %


 


Calcium     (8.4-10.2)  mg/dL


 


Iron     ()  ug/dL


 


Total Protein     (6.3-8.2)  g/dL


 


Albumin     (3.5-5.0)  g/dL














  12/05/22 12/05/22 Range/Units





  10:27 11:44 


 


WBC    (3.8-10.6)  k/uL


 


RBC    (4.30-5.90)  m/uL


 


Hgb    (13.0-17.5)  gm/dL


 


Hct    (39.0-53.0)  %


 


MCH    (25.0-35.0)  pg


 


MCHC    (31.0-37.0)  g/dL


 


Plt Count    (150-450)  k/uL


 


Chloride  93 L   ()  mmol/L


 


Carbon Dioxide  42 H*   (22-30)  mmol/L


 


Creatinine  0.51 L   (0.66-1.25)  mg/dL


 


Glucose  118 H   (74-99)  mg/dL


 


POC Glucose (mg/dL)   118 H  ()  mg/dL


 


Hemoglobin A1c    (0.0-6.0)  %


 


Calcium  7.5 L   (8.4-10.2)  mg/dL


 


Iron    ()  ug/dL


 


Total Protein  5.5 L   (6.3-8.2)  g/dL


 


Albumin  2.3 L   (3.5-5.0)  g/dL








                      Microbiology - Last 24 Hours (Table)











 12/03/22 21:16 Gram Stain - Preliminary





 Foot - Right Wound Culture - Preliminary





    Presumptive Staph aureus














Assessment and Plan


Assessment: 





Acute onset congestive heart failure.


None STEMI with elevated troponin


Moderate aortic stenosis


Lung cancer with multiple nodular pulmonary metastasis throughout both lung 

fields


Chronic left shoulder pain and swelling of the left upper extremity related to 

his history of cancer


Right foot infection with staph


Right foot wound


Lung cancer


Diabetes mellitus


Hypertension





Plan: 





Continue with IV Lasix


Strict I's and O's


Daily weights.


Monitor renal functions


Change antibiotics into doxycycline and follow-up wound culture


Consult cardiology.


Consult hematology oncology.


Consult wound care


Labs and medication were reviewed..  Continue same treatment.  Continue with 

symptomatic treatment.  Resume home medication.  Monitor lytes and vitals.  DVT 

and GI prophylaxis.  Further recommendations as per clinical course of the 

patient


DVT prophylaxis: Subcutaneous heparin


GI Prophylaxis: Pepcid


Prognosis is guarded

## 2022-12-05 NOTE — P.CONS
History of Present Illness





- Reason for Consult


Consult date: 12/05/22


wound care





- History of Present Illness


This is a 62-year-old patient with history of diabetes, hypertension, COPD.  Who

is scheduled to see the wound care center next week.  Patient has a nonhealing 

ulceration in the right plantar foot lateral aspect with necrosis with muscle 

involvement.  Patient has a eschar In place.  The wound measures approximately 3

x 3 x 0.3 cm.  No tunneling or undermining noted.  The wound edges are 

unattached to the wound base.  There is some excoriation noted to the periwound.





Review Of Systems:


Constitutional: No fever, no chills, no night sweats.  No weight change.  No 

weakness, fatigue or lethargy.  No daytime sleepiness.


Integumentary:reports wounds, no lesions.  No rash or pruritus.  No unusual 

bruising.  No change in hair or nails.





Physical exam:


General Appearance: Alert, cooperative, no distress, appears stated age.


Skin: See HPI all other Skin color, texture, tugor normal, no rashes or lesions.


Neurologic: Alert oriented x3 





Assessment:


1.  Nonhealing ulceration of right foot with necrosis of muscle involvement


2.  Diabetic foot ulcer





Plan:


1.  Apply santyl, saline moist gauze, dry gauze, rolled gauze and secure with 

tape.  Change daily.  Minimal weightbearing to the right foot.





Thank you for the consultation any questions please contact the wound care 

center





DNP note has been reviewed and discussed with Dr. Franco and the impression 

and plan of care has been directed as dictated. 








Past Medical History


Past Medical History: Asthma, Cancer, Heart Failure, COPD, Diabetes Mellitus, 

Hypertension


Additional Past Medical History / Comment(s): Uses home O2 as needed only. 

Squamous cell carcinoma of the lung, rib lesion and lymph adenopathy, shoulder 

pain related to lung mass. Pancous tumor left apex


History of Any Multi-Drug Resistant Organisms: MRSA


Year Discovered:: 6/23/16


MDRO Source:: Left Foot


Past Surgical History: Hernia Repair, Orthopedic Surgery, Tonsillectomy


Additional Past Surgical History / Comment(s): lt. 5th toe amp


Past Anesthesia/Blood Transfusion Reactions: No Reported Reaction


Past Psychological History: Anxiety


Smoking Status: Former smoker


Past Alcohol Use History: None Reported


Past Drug Use History: None Reported


Additional Drug Use History / Comment(s): quit drinking 10 years ago in 2012, 

quit smoking Sept 2021





- Past Family History


  ** Sister(s)


Family Medical History: Cancer





  ** Father


Family Medical History: Congestive Heart Failure (CHF), Diabetes Mellitus





  ** Mother


Family Medical History: Renal Disease





Medications and Allergies


                                Home Medications











 Medication  Instructions  Recorded  Confirmed  Type


 


Albuterol Sulfate [Ventolin HFA] 2 puff INHALATION RT-Q4H PRN 09/03/21 12/03/22 

History


 


Morphine Sulfate [Ms Contin] 30 mg PO TID 04/18/22 12/03/22 History


 


ondansetron HCL [Zofran] 8 mg PO TID PRN 09/17/22 12/03/22 History


 


Loratadine [Claritin] 10 mg PO DAILY #30 tab 09/18/22 12/03/22 Rx


 


Furosemide [Lasix] 20 mg PO DAILY PRN 10/12/22 12/03/22 History


 


Pregabalin [Lyrica] 200 mg PO BID 10/12/22 12/03/22 History


 


Cephalexin [Keflex] 500 mg PO QID 12/03/22 12/03/22 History


 


Ferrous Sulfate [Feosol] 325 mg PO DAILY 12/03/22 12/03/22 History


 


Ipratropium-Albuterol Nebulize 3 ml INHALATION RT-QID PRN 12/03/22 12/03/22 

History





[Duoneb 0.5 mg-3 mg/3 ml Soln]    


 


Multivitamins, Thera [Multivitamin 1 tab PO DAILY 12/03/22 12/03/22 History





(formulary)]    








                                    Allergies











Allergy/AdvReac Type Severity Reaction Status Date / Time


 


loratadine [From Claritin-D] AdvReac  Confusion Verified 12/03/22 21:44


 


pseudoephedrine AdvReac  Confusion Verified 12/03/22 21:44





[From Claritin-D]     














Physical Exam


Vitals: 


                                   Vital Signs











  Temp Pulse Pulse Pulse Resp BP Pulse Ox


 


 12/05/22 11:49   103 H     


 


 12/05/22 11:34   100     


 


 12/05/22 10:08  99.3 F   106 H  78  22  100/61  93 L


 


 12/05/22 08:08   100     


 


 12/05/22 07:54   104 H     


 


 12/05/22 04:00  98.3 F    78  20  101/58  93 L


 


 12/05/22 01:50     83  20  


 


 12/04/22 23:56     83  20  103/64  95


 


 12/04/22 20:00  98.1 F    70  21  128/68  96


 


 12/04/22 19:56   98     


 


 12/04/22 19:44   100     


 


 12/04/22 16:23   102 H     


 


 12/04/22 16:13   100     


 


 12/04/22 15:08  98.3 F    101 H  18  120/50  91 L


 


 12/04/22 13:35      18  








                                Intake and Output











 12/04/22 12/05/22 12/05/22





 22:59 06:59 14:59


 


Intake Total 120 120 118


 


Output Total 650  725


 


Balance -530 120 -607


 


Intake:   


 


  Oral 120 120 118


 


Output:   


 


  Urine 650  725


 


Other:   


 


  Voiding Method Diaper Diaper Diaper


 


  # Voids 2  


 


  Weight  106 kg 














Results


CBC & Chem 7: 


                                 12/05/22 10:27





                                 12/05/22 10:27


Labs: 


                  Abnormal Lab Results - Last 24 Hours (Table)











  12/04/22 12/04/22 12/04/22 Range/Units





  05:16 05:16 16:42 


 


WBC     (3.8-10.6)  k/uL


 


RBC     (4.30-5.90)  m/uL


 


Hgb     (13.0-17.5)  gm/dL


 


Hct     (39.0-53.0)  %


 


MCH     (25.0-35.0)  pg


 


MCHC     (31.0-37.0)  g/dL


 


Plt Count     (150-450)  k/uL


 


Chloride     ()  mmol/L


 


Carbon Dioxide     (22-30)  mmol/L


 


Creatinine     (0.66-1.25)  mg/dL


 


Glucose     (74-99)  mg/dL


 


POC Glucose (mg/dL)    140 H  ()  mg/dL


 


Hemoglobin A1c   6.1 H   (0.0-6.0)  %


 


Calcium     (8.4-10.2)  mg/dL


 


Iron  15 L    ()  ug/dL


 


Lactate Dehydrogenase  857 H    (313-618)  U/L


 


Total Protein     (6.3-8.2)  g/dL


 


Albumin     (3.5-5.0)  g/dL














  12/04/22 12/05/22 12/05/22 Range/Units





  20:25 06:34 10:27 


 


WBC    13.4 H  (3.8-10.6)  k/uL


 


RBC    3.73 L  (4.30-5.90)  m/uL


 


Hgb    8.7 L  (13.0-17.5)  gm/dL


 


Hct    32.1 L  (39.0-53.0)  %


 


MCH    23.4 L  (25.0-35.0)  pg


 


MCHC    27.3 L  (31.0-37.0)  g/dL


 


Plt Count    536 H  (150-450)  k/uL


 


Chloride     ()  mmol/L


 


Carbon Dioxide     (22-30)  mmol/L


 


Creatinine     (0.66-1.25)  mg/dL


 


Glucose     (74-99)  mg/dL


 


POC Glucose (mg/dL)  140 H  113 H   ()  mg/dL


 


Hemoglobin A1c     (0.0-6.0)  %


 


Calcium     (8.4-10.2)  mg/dL


 


Iron     ()  ug/dL


 


Lactate Dehydrogenase     (313-618)  U/L


 


Total Protein     (6.3-8.2)  g/dL


 


Albumin     (3.5-5.0)  g/dL














  12/05/22 12/05/22 Range/Units





  10:27 11:44 


 


WBC    (3.8-10.6)  k/uL


 


RBC    (4.30-5.90)  m/uL


 


Hgb    (13.0-17.5)  gm/dL


 


Hct    (39.0-53.0)  %


 


MCH    (25.0-35.0)  pg


 


MCHC    (31.0-37.0)  g/dL


 


Plt Count    (150-450)  k/uL


 


Chloride  93 L   ()  mmol/L


 


Carbon Dioxide  42 H*   (22-30)  mmol/L


 


Creatinine  0.51 L   (0.66-1.25)  mg/dL


 


Glucose  118 H   (74-99)  mg/dL


 


POC Glucose (mg/dL)   118 H  ()  mg/dL


 


Hemoglobin A1c    (0.0-6.0)  %


 


Calcium  7.5 L   (8.4-10.2)  mg/dL


 


Iron    ()  ug/dL


 


Lactate Dehydrogenase    (313-618)  U/L


 


Total Protein  5.5 L   (6.3-8.2)  g/dL


 


Albumin  2.3 L   (3.5-5.0)  g/dL








                      Microbiology - Last 24 Hours (Table)











 12/03/22 21:16 Gram Stain - Preliminary





 Foot - Right Wound Culture - Preliminary





    Presumptive Staph aureus


 


 12/03/22 21:16 Anaerobic Culture - Preliminary





 Foot - Right 














Assessment and Plan


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 

muscle


Current Visit: Yes   Status: Acute   Code(s): L97.513 - NON-PRS CHRONIC ULCER 

OTH PRT RIGHT FOOT W NECROS MUSCLE   SNOMED Code(s): 19026504170427093


   





(2) Type 2 diabetes mellitus with foot ulcer


Current Visit: Yes   Status: Acute   Code(s): E11.621 - TYPE 2 DIABETES MELLITUS

WITH FOOT ULCER; L97.509 - NON-PRESSURE CHRONIC ULCER OTH PRT UNSP FOOT W UNSP 

SEVERITY   SNOMED Code(s): 071936916

## 2022-12-06 LAB
ANION GAP SERPL CALC-SCNC: 3 MMOL/L
BUN SERPL-SCNC: 14 MG/DL (ref 9–20)
CALCIUM SPEC-MCNC: 7.7 MG/DL (ref 8.4–10.2)
CHLORIDE SERPL-SCNC: 89 MMOL/L (ref 98–107)
CO2 SERPL-SCNC: 46 MMOL/L (ref 22–30)
ERYTHROCYTE [DISTWIDTH] IN BLOOD BY AUTOMATED COUNT: 3.73 M/UL (ref 4.3–5.9)
ERYTHROCYTE [DISTWIDTH] IN BLOOD: 15.3 % (ref 11.5–15.5)
GLUCOSE BLD-MCNC: 106 MG/DL (ref 70–110)
GLUCOSE BLD-MCNC: 122 MG/DL (ref 70–110)
GLUCOSE BLD-MCNC: 138 MG/DL (ref 70–110)
GLUCOSE BLD-MCNC: 205 MG/DL (ref 70–110)
GLUCOSE SERPL-MCNC: 100 MG/DL (ref 74–99)
HCT VFR BLD AUTO: 32 % (ref 39–53)
HGB BLD-MCNC: 8.9 GM/DL (ref 13–17.5)
MCH RBC QN AUTO: 23.9 PG (ref 25–35)
MCHC RBC AUTO-ENTMCNC: 27.9 G/DL (ref 31–37)
MCV RBC AUTO: 85.7 FL (ref 80–100)
PLATELET # BLD AUTO: 528 K/UL (ref 150–450)
POTASSIUM SERPL-SCNC: 4.1 MMOL/L (ref 3.5–5.1)
SODIUM SERPL-SCNC: 138 MMOL/L (ref 137–145)
TIBC SERPL-MCNC: 196 UG/DL (ref 228–460)
WBC # BLD AUTO: 12.3 K/UL (ref 3.8–10.6)

## 2022-12-06 RX ADMIN — DOXYCYCLINE SCH MG: 100 CAPSULE ORAL at 20:13

## 2022-12-06 RX ADMIN — HEPARIN SODIUM SCH UNIT: 5000 INJECTION INTRAVENOUS; SUBCUTANEOUS at 09:34

## 2022-12-06 RX ADMIN — FUROSEMIDE SCH MG: 40 TABLET ORAL at 09:36

## 2022-12-06 RX ADMIN — Medication SCH MG: at 09:36

## 2022-12-06 RX ADMIN — INSULIN ASPART SCH: 100 INJECTION, SOLUTION INTRAVENOUS; SUBCUTANEOUS at 20:13

## 2022-12-06 RX ADMIN — IPRATROPIUM BROMIDE AND ALBUTEROL SULFATE SCH ML: .5; 3 SOLUTION RESPIRATORY (INHALATION) at 15:50

## 2022-12-06 RX ADMIN — IPRATROPIUM BROMIDE AND ALBUTEROL SULFATE SCH ML: .5; 3 SOLUTION RESPIRATORY (INHALATION) at 11:52

## 2022-12-06 RX ADMIN — FAMOTIDINE SCH MG: 10 INJECTION, SOLUTION INTRAVENOUS at 09:36

## 2022-12-06 RX ADMIN — INSULIN ASPART SCH: 100 INJECTION, SOLUTION INTRAVENOUS; SUBCUTANEOUS at 06:04

## 2022-12-06 RX ADMIN — LIDOCAINE SCH PATCH: 50 PATCH TOPICAL at 16:04

## 2022-12-06 RX ADMIN — METOPROLOL TARTRATE SCH MG: 25 TABLET, FILM COATED ORAL at 09:36

## 2022-12-06 RX ADMIN — COLLAGENASE SANTYL SCH APPLIC: 250 OINTMENT TOPICAL at 09:36

## 2022-12-06 RX ADMIN — MORPHINE SULFATE SCH MG: 30 TABLET, FILM COATED, EXTENDED RELEASE ORAL at 16:04

## 2022-12-06 RX ADMIN — FAMOTIDINE SCH MG: 20 TABLET, FILM COATED ORAL at 20:12

## 2022-12-06 RX ADMIN — IPRATROPIUM BROMIDE AND ALBUTEROL SULFATE SCH ML: .5; 3 SOLUTION RESPIRATORY (INHALATION) at 20:38

## 2022-12-06 RX ADMIN — INSULIN ASPART SCH: 100 INJECTION, SOLUTION INTRAVENOUS; SUBCUTANEOUS at 18:15

## 2022-12-06 RX ADMIN — METHYLPREDNISOLONE SODIUM SUCCINATE SCH MG: 40 INJECTION, POWDER, FOR SOLUTION INTRAMUSCULAR; INTRAVENOUS at 20:12

## 2022-12-06 RX ADMIN — PREGABALIN SCH MG: 100 CAPSULE ORAL at 09:34

## 2022-12-06 RX ADMIN — HEPARIN SODIUM SCH UNIT: 5000 INJECTION INTRAVENOUS; SUBCUTANEOUS at 16:05

## 2022-12-06 RX ADMIN — INSULIN ASPART SCH UNIT: 100 INJECTION, SOLUTION INTRAVENOUS; SUBCUTANEOUS at 12:23

## 2022-12-06 RX ADMIN — FUROSEMIDE SCH MG: 40 TABLET ORAL at 16:05

## 2022-12-06 RX ADMIN — DOXYCYCLINE SCH MG: 100 CAPSULE ORAL at 09:40

## 2022-12-06 RX ADMIN — IPRATROPIUM BROMIDE AND ALBUTEROL SULFATE PRN ML: .5; 3 SOLUTION RESPIRATORY (INHALATION) at 09:21

## 2022-12-06 RX ADMIN — MORPHINE SULFATE SCH MG: 30 TABLET, FILM COATED, EXTENDED RELEASE ORAL at 09:34

## 2022-12-06 RX ADMIN — METHYLPREDNISOLONE SODIUM SUCCINATE SCH MG: 40 INJECTION, POWDER, FOR SOLUTION INTRAMUSCULAR; INTRAVENOUS at 16:04

## 2022-12-06 RX ADMIN — PREGABALIN SCH MG: 100 CAPSULE ORAL at 20:12

## 2022-12-06 RX ADMIN — LORATADINE SCH MG: 10 TABLET ORAL at 09:36

## 2022-12-06 RX ADMIN — HEPARIN SODIUM SCH UNIT: 5000 INJECTION INTRAVENOUS; SUBCUTANEOUS at 23:36

## 2022-12-06 RX ADMIN — METOPROLOL TARTRATE SCH MG: 25 TABLET, FILM COATED ORAL at 20:12

## 2022-12-06 RX ADMIN — DOCUSATE SODIUM SCH MG: 100 CAPSULE, LIQUID FILLED ORAL at 12:23

## 2022-12-06 RX ADMIN — MORPHINE SULFATE SCH MG: 30 TABLET, FILM COATED, EXTENDED RELEASE ORAL at 20:11

## 2022-12-06 RX ADMIN — DOCUSATE SODIUM SCH MG: 100 CAPSULE, LIQUID FILLED ORAL at 20:11

## 2022-12-06 RX ADMIN — SENNOSIDES PRN MG: 8.6 TABLET, FILM COATED ORAL at 12:23

## 2022-12-06 NOTE — P.CNPUL
History of Present Illness


Consult date: 12/06/22


Reason for consult: dyspnea, cough, COPD, hypoxemia, pneumonia, lung mass


Chief complaint: Progressive shortness of breath


History of present illness: 





Patient is a 62-year-old male with prior medical history of lung cancer has been

on immunotherapy with Dr. Schaefer also has a history of chronic hypoxic respiratory

failure on home oxygen 2 L history of smoking and nicotine use hypertension 

hypertensive cardiovascular disease.  Patient presented into the hospital with 

increasing shortness of breath of several day duration as well as increasing 

lower extremity swelling currently patient has been on IV Lasix switched to by 

mouth, and responding well in addition patient has been on bronchodilator 

doxycycline and his home medications, echocardiogram revealed normal LV function

with moderate aortic stenosis with a peak gradient is 37 and a mean gradient 22 

and mild mitral stenosis patient has been in negative balance.  Chest x-ray on 

admission revealed bilateral pulmonary infiltrate with pleural thickening 

increase more so on the right side compared to left side, venous Doppler study 

of the negative for DVT, computed tomography scan of the chest revealed no 

evidence of pulmonary embolism, pleural thickening and destructive changes noted

in the left upper lateral leads along with large mass in the upper lateral chest

wall, and multiple nodular nodular masses bilaterally in lung field with right-

sided pleural effusion, effusion has increased compared to prior exam in 

November 2022





Review of Systems


All systems: negative





Past Medical History


Past Medical History: Asthma, Cancer, Heart Failure, COPD, Diabetes Mellitus, 

Hypertension


Additional Past Medical History / Comment(s): Uses home O2 as needed only. 

Squamous cell carcinoma of the lung, rib lesion and lymph adenopathy, shoulder 

pain related to lung mass. Pancous tumor left apex


History of Any Multi-Drug Resistant Organisms: MRSA


Date of last positivie culture/infection: 6/23/16


MDRO Source:: Left Foot


Past Surgical History: Hernia Repair, Orthopedic Surgery, Tonsillectomy


Additional Past Surgical History / Comment(s): lt. 5th toe amp


Past Anesthesia/Blood Transfusion Reactions: No Reported Reaction


Past Psychological History: Anxiety


Smoking Status: Former smoker


Past Alcohol Use History: None Reported


Past Drug Use History: None Reported


Additional Drug Use History / Comment(s): quit drinking 10 years ago in 2012, 

quit smoking Sept 2021





- Past Family History


  ** Sister(s)


Family Medical History: Cancer





  ** Father


Family Medical History: Congestive Heart Failure (CHF), Diabetes Mellitus





  ** Mother


Family Medical History: Renal Disease





Medications and Allergies


                                Home Medications











 Medication  Instructions  Recorded  Confirmed  Type


 


Albuterol Sulfate [Ventolin HFA] 2 puff INHALATION RT-Q4H PRN 09/03/21 12/03/22 

History


 


Morphine Sulfate [Ms Contin] 30 mg PO TID 04/18/22 12/03/22 History


 


ondansetron HCL [Zofran] 8 mg PO TID PRN 09/17/22 12/03/22 History


 


Loratadine [Claritin] 10 mg PO DAILY #30 tab 09/18/22 12/03/22 Rx


 


Furosemide [Lasix] 20 mg PO DAILY PRN 10/12/22 12/03/22 History


 


Pregabalin [Lyrica] 200 mg PO BID 10/12/22 12/03/22 History


 


Cephalexin [Keflex] 500 mg PO QID 12/03/22 12/03/22 History


 


Ferrous Sulfate [Feosol] 325 mg PO DAILY 12/03/22 12/03/22 History


 


Ipratropium-Albuterol Nebulize 3 ml INHALATION RT-QID PRN 12/03/22 12/03/22 

History





[Duoneb 0.5 mg-3 mg/3 ml Soln]    


 


Multivitamins, Thera [Multivitamin 1 tab PO DAILY 12/03/22 12/03/22 History





(formulary)]    








                                    Allergies











Allergy/AdvReac Type Severity Reaction Status Date / Time


 


loratadine [From Claritin-D] AdvReac  Confusion Verified 12/03/22 21:44


 


pseudoephedrine AdvReac  Confusion Verified 12/03/22 21:44





[From Claritin-D]     














Physical Exam


Vitals: 


                                   Vital Signs











  Temp Pulse Pulse Pulse Resp BP Pulse Ox


 


 12/06/22 12:25  98.2 F   89   18  102/63  90 L


 


 12/06/22 12:04   84    20  


 


 12/06/22 11:53   88    20  


 


 12/06/22 09:35  98.1 F   88   20  105/66  90 L


 


 12/06/22 09:31   88    18  


 


 12/06/22 09:21   86    18   88 L


 


 12/06/22 04:00     77  19  102/60  93 L


 


 12/06/22 02:00    83   19  


 


 12/06/22 00:00    83   19  99/58  92 L


 


 12/05/22 20:08   84     


 


 12/05/22 20:00  98.1 F   84  78  20  104/66  91 L


 


 12/05/22 19:55   85      90 L


 


 12/05/22 17:35  98.7 F   90   20  94/56  95


 


 12/05/22 15:31   93     


 


 12/05/22 15:21   93     


 


 12/05/22 14:00      20  








                                Intake and Output











 12/05/22 12/06/22 12/06/22





 22:59 06:59 14:59


 


Intake Total   240


 


Output Total   250


 


Balance   -10


 


Intake:   


 


  Oral   240


 


Output:   


 


  Urine   250


 


Other:   


 


  Voiding Method Diaper Diaper Diaper


 


  Weight  105.5 kg 














- Constitutional


General appearance: average body habitus, cooperative, disheveled, mild distress





- EENT


Eyes: EOMI, PERRLA


ENT: normal oropharynx


Ears: bilateral: normal





- Neck


Carotids: bilateral: upstroke normal





- Respiratory


Respiratory: bilateral: diminished, dullness (Bilaterally in the bases)





- Cardiovascular


Rhythm: regular


Heart sounds: normal: S1, S2





- Gastrointestinal


General gastrointestinal: hyperactive bowel sounds





- Integumentary





Bilateral pedal edema with chronic skin changes


Integumentary: decreased turgor





- Neurologic


Neurologic: CNII-XII intact





- Musculoskeletal


Musculoskeletal: gait normal, generalized weakness, strength equal bilaterally





- Psychiatric


Psychiatric: A&O x's 3, appropriate affect, intact judgment & insight





Results





- Laboratory Findings


CBC and BMP: 


                                 12/06/22 08:29





                                 12/06/22 08:29


PT/INR, D-dimer











PT  12.0 sec (9.0-12.0)   12/04/22  05:16    


 


INR  1.2  (<1.2)  H  12/04/22  05:16    








Abnormal lab findings: 


                                  Abnormal Labs











  12/03/22 12/03/22 12/03/22





  20:10 20:10 20:10


 


WBC  12.9 H  


 


RBC  3.67 L  


 


Hgb  8.8 L  


 


Hct  31.1 L  


 


MCH  23.9 L  


 


MCHC  28.2 L  


 


RDW  15.6 H  


 


Plt Count  628 H  


 


Neutrophils #  10.4 H  


 


INR   


 


APTT   31.4 H 


 


Chloride   


 


Carbon Dioxide    38 H


 


Creatinine    0.50 L


 


Glucose    113 H


 


POC Glucose (mg/dL)   


 


Hemoglobin A1c   


 


Calcium    8.0 L


 


Iron   


 


TIBC   


 


% Saturation   


 


Transferrin   


 


Lactate Dehydrogenase   


 


Troponin I   


 


Total Protein    6.1 L


 


Albumin    2.6 L


 


Urine Protein   














  12/03/22 12/03/22 12/03/22





  20:10 21:16 23:41


 


WBC   


 


RBC   


 


Hgb   


 


Hct   


 


MCH   


 


MCHC   


 


RDW   


 


Plt Count   


 


Neutrophils #   


 


INR   


 


APTT   


 


Chloride   


 


Carbon Dioxide   


 


Creatinine   


 


Glucose   


 


POC Glucose (mg/dL)   


 


Hemoglobin A1c   


 


Calcium   


 


Iron   


 


TIBC   


 


% Saturation   


 


Transferrin   


 


Lactate Dehydrogenase   


 


Troponin I  0.100 H*   0.099 H*


 


Total Protein   


 


Albumin   


 


Urine Protein   Trace H 














  12/04/22 12/04/22 12/04/22





  05:16 05:16 05:16


 


WBC   13.7 H 


 


RBC   3.67 L 


 


Hgb   8.8 L 


 


Hct   31.5 L 


 


MCH   23.9 L 


 


MCHC   27.8 L 


 


RDW   


 


Plt Count   545 H 


 


Neutrophils #   11.9 H 


 


INR  1.2 H  


 


APTT  41.7 H  


 


Chloride   


 


Carbon Dioxide   


 


Creatinine   


 


Glucose   


 


POC Glucose (mg/dL)   


 


Hemoglobin A1c   


 


Calcium   


 


Iron   


 


TIBC   


 


% Saturation   


 


Transferrin   


 


Lactate Dehydrogenase   


 


Troponin I    0.088 H*


 


Total Protein   


 


Albumin   


 


Urine Protein   














  12/04/22 12/04/22 12/04/22





  05:16 05:16 05:16


 


WBC   


 


RBC   


 


Hgb   


 


Hct   


 


MCH   


 


MCHC   


 


RDW   


 


Plt Count   


 


Neutrophils #   


 


INR   


 


APTT   


 


Chloride  96 L  


 


Carbon Dioxide  40 H  


 


Creatinine  0.51 L  


 


Glucose  115 H  


 


POC Glucose (mg/dL)   


 


Hemoglobin A1c    6.1 H


 


Calcium  7.5 L  


 


Iron   15 L 


 


TIBC   196 L 


 


% Saturation   7.55 L 


 


Transferrin   140.0 L 


 


Lactate Dehydrogenase   857 H 


 


Troponin I   


 


Total Protein   


 


Albumin   


 


Urine Protein   














  12/04/22 12/04/22 12/04/22





  06:27 11:53 16:42


 


WBC   


 


RBC   


 


Hgb   


 


Hct   


 


MCH   


 


MCHC   


 


RDW   


 


Plt Count   


 


Neutrophils #   


 


INR   


 


APTT   


 


Chloride   


 


Carbon Dioxide   


 


Creatinine   


 


Glucose   


 


POC Glucose (mg/dL)  113 H  138 H  140 H


 


Hemoglobin A1c   


 


Calcium   


 


Iron   


 


TIBC   


 


% Saturation   


 


Transferrin   


 


Lactate Dehydrogenase   


 


Troponin I   


 


Total Protein   


 


Albumin   


 


Urine Protein   














  12/04/22 12/05/22 12/05/22





  20:25 06:34 10:27


 


WBC    13.4 H


 


RBC    3.73 L


 


Hgb    8.7 L


 


Hct    32.1 L


 


MCH    23.4 L


 


MCHC    27.3 L


 


RDW   


 


Plt Count    536 H


 


Neutrophils #   


 


INR   


 


APTT   


 


Chloride   


 


Carbon Dioxide   


 


Creatinine   


 


Glucose   


 


POC Glucose (mg/dL)  140 H  113 H 


 


Hemoglobin A1c   


 


Calcium   


 


Iron   


 


TIBC   


 


% Saturation   


 


Transferrin   


 


Lactate Dehydrogenase   


 


Troponin I   


 


Total Protein   


 


Albumin   


 


Urine Protein   














  12/05/22 12/05/22 12/05/22





  10:27 11:44 16:42


 


WBC   


 


RBC   


 


Hgb   


 


Hct   


 


MCH   


 


MCHC   


 


RDW   


 


Plt Count   


 


Neutrophils #   


 


INR   


 


APTT   


 


Chloride  93 L  


 


Carbon Dioxide  42 H*  


 


Creatinine  0.51 L  


 


Glucose  118 H  


 


POC Glucose (mg/dL)   118 H  133 H


 


Hemoglobin A1c   


 


Calcium  7.5 L  


 


Iron   


 


TIBC   


 


% Saturation   


 


Transferrin   


 


Lactate Dehydrogenase   


 


Troponin I   


 


Total Protein  5.5 L  


 


Albumin  2.3 L  


 


Urine Protein   














  12/05/22 12/06/22 12/06/22





  20:04 06:01 08:29


 


WBC    12.3 H


 


RBC    3.73 L


 


Hgb    8.9 L


 


Hct    32.0 L


 


MCH    23.9 L


 


MCHC    27.9 L


 


RDW   


 


Plt Count    528 H


 


Neutrophils #   


 


INR   


 


APTT   


 


Chloride   


 


Carbon Dioxide   


 


Creatinine   


 


Glucose   


 


POC Glucose (mg/dL)  118 H  122 H 


 


Hemoglobin A1c   


 


Calcium   


 


Iron   


 


TIBC   


 


% Saturation   


 


Transferrin   


 


Lactate Dehydrogenase   


 


Troponin I   


 


Total Protein   


 


Albumin   


 


Urine Protein   














  12/06/22 12/06/22





  08:29 11:49


 


WBC  


 


RBC  


 


Hgb  


 


Hct  


 


MCH  


 


MCHC  


 


RDW  


 


Plt Count  


 


Neutrophils #  


 


INR  


 


APTT  


 


Chloride  89 L 


 


Carbon Dioxide  46 H* 


 


Creatinine  0.51 L 


 


Glucose  100 H 


 


POC Glucose (mg/dL)   205 H


 


Hemoglobin A1c  


 


Calcium  7.7 L 


 


Iron  


 


TIBC  


 


% Saturation  


 


Transferrin  


 


Lactate Dehydrogenase  


 


Troponin I  


 


Total Protein  


 


Albumin  


 


Urine Protein  














- Diagnostic Findings


Chest x-ray: report reviewed, image reviewed


CT scan - chest: report reviewed, image reviewed





Assessment and Plan


Assessment: 





Acute on chronic hypoxic respiratory failure appeared to be related to 

combination diastolic heart failure and COPD exacerbation





Acute COPD exacerbation





Lung mass with invasion to chest wall distraction involving the ribs





Metastatic stage IV lung cancer





Anasarca and fluid overload





Bilateral small pleural effusion more so on the right side compared left side


Plan: 





Continue bronchodilators





Antibiotics





Diuretics





Supplemental oxygen, titrated oxygen down as tolerated





IV steroids





Further plan of care as per clinical response of the patient


Time with Patient: Greater than 30

## 2022-12-06 NOTE — P.PN
Subjective


Progress Note Date: 12/06/22


Principal diagnosis: 





CHF exacerbation.  Met NSCLC





In f/u today pt is finally feeling well enough to try and sit up at the bedside.

 His family reports pt is more alert and has been starting to eat and drink a 

little bit.  O2 at 4 L today.





Objective





- Vital Signs


Vital signs: 


                                   Vital Signs











Temp  98.3 F   12/06/22 16:00


 


Pulse  83   12/06/22 16:00


 


Resp  20   12/06/22 16:00


 


BP  141/59   12/06/22 16:00


 


Pulse Ox  93 L  12/06/22 16:00


 


FiO2      








                                 Intake & Output











 12/05/22 12/06/22 12/06/22





 18:59 06:59 18:59


 


Intake Total 298  240


 


Output Total 725  250


 


Balance -427  -10


 


Weight 106 kg 105.5 kg 


 


Intake:   


 


  Oral 298  240


 


Output:   


 


  Urine 725  250


 


Other:   


 


  Voiding Method Diaper Diaper Diaper














- Constitutional


General appearance: Present: average body habitus, cooperative, no acute 

distress





- EENT


Eyes: Present: anicteric sclerae, EOMI


ENT: Present: hearing grossly normal





- Neck


Details: 





LUE lymphedema





- Respiratory


Respiratory: bilateral: diminished





- Cardiovascular


Rhythm: regular


Heart sounds: normal: S1, S2


Abnormal Heart Sounds: Absent: systolic murmur, diastolic murmur, rub, S3 Mojica

p, S4 Gallop, click, other





- Peripheral edema


  ** leg


Peripheral Edema: bilateral: 2+





- Integumentary


Integumentary Comment(s): 





rt heel wound, unstageable, necrotic/black eschar center, wound care following 

and treating





- Neurologic


Neurologic: Present: CNII-XII intact





- Musculoskeletal


Musculoskeletal Comment(s): 





pt leaning to the right, having difficulty sitting up at bedside independently 


Musculoskeletal: Present: generalized weakness





- Psychiatric


Psychiatric: Present: A&O x's 3, appropriate affect, intact judgment & insight





- Labs


CBC & Chem 7: 


                                 12/06/22 08:29





                                 12/06/22 08:29


Labs: 


                  Abnormal Lab Results - Last 24 Hours (Table)











  12/04/22 12/05/22 12/05/22 Range/Units





  05:16 10:27 20:04 


 


WBC     (3.8-10.6)  k/uL


 


RBC     (4.30-5.90)  m/uL


 


Hgb     (13.0-17.5)  gm/dL


 


Hct     (39.0-53.0)  %


 


MCH     (25.0-35.0)  pg


 


MCHC     (31.0-37.0)  g/dL


 


Plt Count     (150-450)  k/uL


 


Haptoglobin   320.0 H   (31.2-198.0)  mg/dL


 


Chloride     ()  mmol/L


 


Carbon Dioxide     (22-30)  mmol/L


 


Creatinine     (0.66-1.25)  mg/dL


 


Glucose     (74-99)  mg/dL


 


POC Glucose (mg/dL)    118 H  ()  mg/dL


 


Calcium     (8.4-10.2)  mg/dL


 


TIBC  196 L    (228-460)  ug/dL


 


% Saturation  7.55 L    (15.00-50.00)   


 


Transferrin  140.0 L    (204.0-354.0)  mg/dL














  12/06/22 12/06/22 12/06/22 Range/Units





  06:01 08:29 08:29 


 


WBC   12.3 H   (3.8-10.6)  k/uL


 


RBC   3.73 L   (4.30-5.90)  m/uL


 


Hgb   8.9 L   (13.0-17.5)  gm/dL


 


Hct   32.0 L   (39.0-53.0)  %


 


MCH   23.9 L   (25.0-35.0)  pg


 


MCHC   27.9 L   (31.0-37.0)  g/dL


 


Plt Count   528 H   (150-450)  k/uL


 


Haptoglobin     (31.2-198.0)  mg/dL


 


Chloride    89 L  ()  mmol/L


 


Carbon Dioxide    46 H*  (22-30)  mmol/L


 


Creatinine    0.51 L  (0.66-1.25)  mg/dL


 


Glucose    100 H  (74-99)  mg/dL


 


POC Glucose (mg/dL)  122 H    ()  mg/dL


 


Calcium    7.7 L  (8.4-10.2)  mg/dL


 


TIBC     (228-460)  ug/dL


 


% Saturation     (15.00-50.00)   


 


Transferrin     (204.0-354.0)  mg/dL














  12/06/22 Range/Units





  11:49 


 


WBC   (3.8-10.6)  k/uL


 


RBC   (4.30-5.90)  m/uL


 


Hgb   (13.0-17.5)  gm/dL


 


Hct   (39.0-53.0)  %


 


MCH   (25.0-35.0)  pg


 


MCHC   (31.0-37.0)  g/dL


 


Plt Count   (150-450)  k/uL


 


Haptoglobin   (31.2-198.0)  mg/dL


 


Chloride   ()  mmol/L


 


Carbon Dioxide   (22-30)  mmol/L


 


Creatinine   (0.66-1.25)  mg/dL


 


Glucose   (74-99)  mg/dL


 


POC Glucose (mg/dL)  205 H  ()  mg/dL


 


Calcium   (8.4-10.2)  mg/dL


 


TIBC   (228-460)  ug/dL


 


% Saturation   (15.00-50.00)   


 


Transferrin   (204.0-354.0)  mg/dL








                      Microbiology - Last 24 Hours (Table)











 12/03/22 21:16 Gram Stain - Final





 Foot - Right Wound Culture - Final





    Staphylococcus aureus














Assessment and Plan


(1) CHF (congestive heart failure)


Current Visit: Yes   Status: Acute   Priority: High   Code(s): I50.9 - HEART 

FAILURE, UNSPECIFIED   SNOMED Code(s): 35328718


   





(2) Squamous cell lung cancer


Current Visit: No   Status: Chronic   Priority: Medium   Code(s): C34.90 - 

MALIGNANT NEOPLASM OF UNSP PART OF UNSP BRONCHUS OR LUNG   SNOMED Code(s): 

913611289


   


Plan: 





Pt is doing much better from a CHF and COPD exacerbation standpoint.  

Significant reduction of fluid retention in the face, SOB is stable.  Cont 

treatment per Cardiology and Pulmonary


Wound care is following for rt heel ulceration.


WBC and plt are trending down very slowly as his overall condition improves.  


Pt had treatment f/u CT scan recently.  Will review with Dr. Eller for the plan 

of care based on the findings.  He will resume treatment once his current 

condition is adequately resolved and he is a bit stronger.  Will await plans for

DC then plan f/u appt


Agree with plan for PT/OT with home care

## 2022-12-06 NOTE — P.PN
Subjective


This is a 62-year-old male with a past medical history of lung cancer on 

palliative therapy follows with Dr. Eller, asthma, type 2 diabetes, hyperten

dada, COPD, former tobacco use.  He does not follow with a cardiologist.  We are

consulted for elevated troponin.  Patient presents emergency department with 

worsening shortness of breath and dyspnea on exertion and lower extremity edema.

There was concern for congestive heart failure and patient was started on IV 

Lasix.  He underwent an echocardiogram that revealed normal LV systolic 

function, moderate aortic stenosis with a peak gradient of 37 mmHg, mean 

gradient of 22 mmHg, mild mitral stenosis.





Patient seen and examined at bedside, no acute distress. Heart rates have 

improved. /66 HR 88.  He continues to be on IV Lasix.  Patient with -725mL

fluid balance.  Also being treated for an acute COPD exacerbation.





GENERAL: In no acute distress.


NECK: Supple without JVD


LUNGS: Breath sounds diminished in bases to auscultation bilaterally. 

Respiration equal and unlabored.  No wheezes, rales or rhonchi.


HEART: Regular rate and rhythm without murmurs, rubs or gallops. S1 and S2 

heard.


EXTREMITIES: Normal range of motion, 1+bilateral lower extremity edema.  No 

clubbing or cyanosis. Peripheral pulses intact.





ASSESSMENT


Acute on chronic heart failure with preserved ejection fraction


COPD exacerbation


Mildly elevated troponin, unclear significance


Lung carcinoma, on immunotherapy, metastatic 


Hypertension


COPD


Former tobacco use


Type 2 diabetes


Nonhealing ulceration of right foot


Diabetic foot ulcer





PLAN


Transition to PO Lasix 40mg BID 


Monitor I/Os, daily weights, renal function and electrolytes, while inpatient 


Continue metoprolol tartrate 25mg BID


Patient is currently stable from a cardiology perspective 


Further recommendations based on clinical course





Nurse Practitioner note has been reviewed, I agree with a documented findings 

and plan of care.  Patient was seen and examined.





Objective





- Vital Signs


Vital signs: 


                                   Vital Signs











Temp  98.1 F   12/06/22 09:35


 


Pulse  84   12/06/22 12:04


 


Resp  20   12/06/22 12:04


 


BP  105/66   12/06/22 09:35


 


Pulse Ox  90 L  12/06/22 09:35


 


FiO2      








                                 Intake & Output











 12/05/22 12/06/22 12/06/22





 18:59 06:59 18:59


 


Intake Total 298  


 


Output Total 725  250


 


Balance -427  -250


 


Weight 106 kg 105.5 kg 


 


Intake:   


 


  Oral 298  


 


Output:   


 


  Urine 725  250


 


Other:   


 


  Voiding Method Diaper Diaper Diaper














- Labs


CBC & Chem 7: 


                                 12/06/22 08:29





                                 12/06/22 08:29


Labs: 


                  Abnormal Lab Results - Last 24 Hours (Table)











  12/05/22 12/05/22 12/06/22 Range/Units





  16:42 20:04 06:01 


 


WBC     (3.8-10.6)  k/uL


 


RBC     (4.30-5.90)  m/uL


 


Hgb     (13.0-17.5)  gm/dL


 


Hct     (39.0-53.0)  %


 


MCH     (25.0-35.0)  pg


 


MCHC     (31.0-37.0)  g/dL


 


Plt Count     (150-450)  k/uL


 


Chloride     ()  mmol/L


 


Carbon Dioxide     (22-30)  mmol/L


 


Creatinine     (0.66-1.25)  mg/dL


 


Glucose     (74-99)  mg/dL


 


POC Glucose (mg/dL)  133 H  118 H  122 H  ()  mg/dL


 


Calcium     (8.4-10.2)  mg/dL














  12/06/22 12/06/22 12/06/22 Range/Units





  08:29 08:29 11:49 


 


WBC  12.3 H    (3.8-10.6)  k/uL


 


RBC  3.73 L    (4.30-5.90)  m/uL


 


Hgb  8.9 L    (13.0-17.5)  gm/dL


 


Hct  32.0 L    (39.0-53.0)  %


 


MCH  23.9 L    (25.0-35.0)  pg


 


MCHC  27.9 L    (31.0-37.0)  g/dL


 


Plt Count  528 H    (150-450)  k/uL


 


Chloride   89 L   ()  mmol/L


 


Carbon Dioxide   46 H*   (22-30)  mmol/L


 


Creatinine   0.51 L   (0.66-1.25)  mg/dL


 


Glucose   100 H   (74-99)  mg/dL


 


POC Glucose (mg/dL)    205 H  ()  mg/dL


 


Calcium   7.7 L   (8.4-10.2)  mg/dL








                      Microbiology - Last 24 Hours (Table)











 12/03/22 21:16 Gram Stain - Preliminary





 Foot - Right Wound Culture - Preliminary





    Presumptive Staph aureus

## 2022-12-06 NOTE — CDI
Documentation Clarification Form



Date: 12/06/2022 12:36:04 PM

From: Kimi aBnda CCS, CCDS

Phone: [phone]

MRN: [id number]

Admit Date: 12/03/2022 10:38:00 PM

Patient Name: Crys Epps

Visit Number: [id number]

Discharge Date:  





ATTENTION: The Clinical Documentation Specialists (CDI) and [facility] Coding Staff 
appreciate your assistance in clarifying documentation. Please respond to the 
clarification below the line at the bottom and electronically sign. The CDI & 
[facility] Coding staff will review the response and follow-up if needed. Please note: 
Queries are made part of the Legal Health Record. If you have any questions, 
please contact the author of this message via ITS.



Dr. Sandy Crawford:



Acute on Chronic Anemia is documented in the 12/4 Oncology Consult without 
further specificity. 



Additional specificity regarding the Type & Acuity of Anemia is requested.



History/Risk Factors per the 12/4 H/P: Lung Cancer on Immunotherapy, CHF, COPD, 
Asthma, Hypertension, Diabetes mellitus, MRSA left foot, Anxiety, Former smoker.



Clinical indicators: Presented to the ED on 12/3 with worsening SOB and right 
lower extremity edema, left upper extremity edema and facial edema.

Admit with CHF, NSTEMI (ruled out by Cardiology), Cancer, Wound on foot and 
Pleural Effusion. 



Hemoglobin 12/3: 8.8.  12/4: 8.8.  12/5: 8.7.  12/6: 8.9.

Hematocrit 12/3: 31.1.  12/4: 31.5.  12/5: 32.1.  12/6: 32.0.



12/4 Iron Profile: Iron 15, TIBC pending, % Saturation pending. 



Treatment 12/3: Telemetry, Aerobic Wound culture right foot, Anaerobic wound 
culture, O2, IV Ativan 1 mg x1, IV Lasix 40 mg x1, INH Ventolin 2.5 mg q4H/prn, 
INH Duoneb 3 ml QID/prn, IV Heparin drip. 

12/4: Iron profile ordered. 



Please clarify the Type & Acuity of Anemia: 



[  ] Acute on chronic blood loss anemia

[  ] Chronic blood loss anemia

[  ] Iron deficiency anemia

[  ] Hemolytic anemia

[  ] Drug induced anemia

[  ] Anemia due to malignancy

[  ] Nutritional anemia

[  ] Anemia of chronic disease

[  ] Unable to determine

[  ] Other, please specify ___________



(Template Last Revised: February 2021)

___________________________________________________________________________

MTDD

## 2022-12-06 NOTE — P.PN
Subjective





History of present illness; patient is a 62-year-old gentleman with past medical

history significant for lung cancer on immunotherapy, congestive heart Failure, 

COPD, asthma who presented to the ER because of worsening shortness of breath 

and swelling of legs.  Patient has been noticing increasing shortness of breath 

on exertion for the last couple of days associated with increased swelling of 

legs.  Patient was on diuretics at home but stated that it was not working and 

he continues to be short of breath.  Patient also currently on antibiotics for 

right foot wound.  Because of this worsening shortness of breath and swelling of

legs patient came to the ER.  Initial lab work showed patient to have a white 

count of 12.9, hemoglobin of 8.8, platelet count of 628, sodium 137, potassium 

4.3, carbon dioxide 38, troponin 0.1.  CTA chest done showed no evidence of 

pulmonary embolism.  It did show pleural thickening and destructive changes in 

the left upper ribs.  Large mass involving the upper limits of chest wall was 

also seen.  Patient was admitted to hospitalist service for further evaluation 

and treatment





12/05/2022


Patient still complaining of from dyspnea he says the same but no chest pain and

no significant cough and


He has chronic left shoulder pain and swelling of the left upper extremity 

related to his cancer


Also he has evidence of right foot infection with no evidence of cellulitis, he 

has diabetic foot ulcer on the sole with black eschar


He is in 2 L oxygen via nasal cannula


Oncology team are on the case and the recommend to resume immunotherapy on 12/15

if he is stable, no recent chemotherapy





12/06/2022


Patient breathing is improving however patient is still hypoxic 88-92% on 4 L 

oxygen via nasal cannula


His with non-STEMI moderate aortic stenosis and CHF, he is on IV Lasix was 

switched to oral dose today.  Cardiologist on the case.


His WBC is 12,000, hemoglobin 8.9.


Pulmonary team were consulted today


Patient informed about his next immunotherapy appointment on 12/15 and vision 

and wife at bedside agreeable.


Patient has home oxygen of 2 L/m 


because of his cancer he is on morphine 3 times a day each make him little bit 

more sleepy which is expected.  Also patient has constipation so Colace and 

senna was added








Objective





- Vital Signs


Vital signs: 


                                   Vital Signs











Temp  98.1 F   12/06/22 09:35


 


Pulse  88   12/06/22 09:35


 


Resp  20   12/06/22 09:35


 


BP  105/66   12/06/22 09:35


 


Pulse Ox  90 L  12/06/22 09:35


 


FiO2      








                                 Intake & Output











 12/05/22 12/06/22 12/06/22





 18:59 06:59 18:59


 


Intake Total 298  


 


Output Total 725  250


 


Balance -427  -250


 


Weight 106 kg 105.5 kg 


 


Intake:   


 


  Oral 298  


 


Output:   


 


  Urine 725  250


 


Other:   


 


  Voiding Method Diaper Diaper 














- Exam





GENERAL: The patient is alert and oriented x3, not in any acute distress. Well 

developed, well nourished. 


HEENT: Pupils are round and equally reacting to light. EOMI. No scleral icterus.

No conjunctival pallor. Normocephalic, atraumatic. No pharyngeal erythema. No 

thyromegaly. 


CARDIOVASCULAR: S1 and S2 present. No murmurs, rubs, or gallops. 


-PULMONARY: Chest is clear to auscultation, no wheezing .  Bilateral basal or 

crackles. 


ABDOMEN: Soft, nontender, nondistended, normoactive bowel sounds. No palpable 

organomegaly. 


MUSCULOSKELETAL: No joint swelling or deformity. 


-EXTREMITIES: No cyanosis, clubbing, or pedal mild B/L Castillo edema.  Left upper 

extremities as well as with limitation of movement [chronic] right foot ulcer on

 the sole with black eschar on the top, no surrounding cellulitis


NEUROLOGICAL: Gross neurological examination did not reveal any focal deficits. 


SKIN: No rashes. no petechiae.





- Labs


CBC & Chem 7: 


                                 12/06/22 08:29





                                 12/06/22 08:29


Labs: 


                  Abnormal Lab Results - Last 24 Hours (Table)











  12/05/22 12/05/22 12/05/22 Range/Units





  10:27 11:44 16:42 


 


WBC     (3.8-10.6)  k/uL


 


RBC     (4.30-5.90)  m/uL


 


Hgb     (13.0-17.5)  gm/dL


 


Hct     (39.0-53.0)  %


 


MCH     (25.0-35.0)  pg


 


MCHC     (31.0-37.0)  g/dL


 


Plt Count     (150-450)  k/uL


 


Chloride  93 L    ()  mmol/L


 


Carbon Dioxide  42 H*    (22-30)  mmol/L


 


Creatinine  0.51 L    (0.66-1.25)  mg/dL


 


Glucose  118 H    (74-99)  mg/dL


 


POC Glucose (mg/dL)   118 H  133 H  ()  mg/dL


 


Calcium  7.5 L    (8.4-10.2)  mg/dL


 


Total Protein  5.5 L    (6.3-8.2)  g/dL


 


Albumin  2.3 L    (3.5-5.0)  g/dL














  12/05/22 12/06/22 12/06/22 Range/Units





  20:04 06:01 08:29 


 


WBC    12.3 H  (3.8-10.6)  k/uL


 


RBC    3.73 L  (4.30-5.90)  m/uL


 


Hgb    8.9 L  (13.0-17.5)  gm/dL


 


Hct    32.0 L  (39.0-53.0)  %


 


MCH    23.9 L  (25.0-35.0)  pg


 


MCHC    27.9 L  (31.0-37.0)  g/dL


 


Plt Count    528 H  (150-450)  k/uL


 


Chloride     ()  mmol/L


 


Carbon Dioxide     (22-30)  mmol/L


 


Creatinine     (0.66-1.25)  mg/dL


 


Glucose     (74-99)  mg/dL


 


POC Glucose (mg/dL)  118 H  122 H   ()  mg/dL


 


Calcium     (8.4-10.2)  mg/dL


 


Total Protein     (6.3-8.2)  g/dL


 


Albumin     (3.5-5.0)  g/dL














  12/06/22 Range/Units





  08:29 


 


WBC   (3.8-10.6)  k/uL


 


RBC   (4.30-5.90)  m/uL


 


Hgb   (13.0-17.5)  gm/dL


 


Hct   (39.0-53.0)  %


 


MCH   (25.0-35.0)  pg


 


MCHC   (31.0-37.0)  g/dL


 


Plt Count   (150-450)  k/uL


 


Chloride  89 L  ()  mmol/L


 


Carbon Dioxide  46 H*  (22-30)  mmol/L


 


Creatinine  0.51 L  (0.66-1.25)  mg/dL


 


Glucose  100 H  (74-99)  mg/dL


 


POC Glucose (mg/dL)   ()  mg/dL


 


Calcium  7.7 L  (8.4-10.2)  mg/dL


 


Total Protein   (6.3-8.2)  g/dL


 


Albumin   (3.5-5.0)  g/dL








                      Microbiology - Last 24 Hours (Table)











 12/03/22 21:16 Gram Stain - Preliminary





 Foot - Right Wound Culture - Preliminary





    Presumptive Staph aureus














Assessment and Plan


Assessment: 





Acute onset congestive heart failure.


None STEMI with elevated troponin


Moderate aortic stenosis


Lung cancer with multiple nodular pulmonary metastasis throughout both lung 

fields


Chronic left shoulder pain and swelling of the left upper extremity related to 

his history of cancer


Right foot infection with staph


Acute on chronic hypoxic respiratory failure


Right foot wound


Lung cancer


Diabetes mellitus


Hypertension





Plan: 





Continue with IV Lasix


Strict I's and O's


Daily weights.


Monitor renal functions


Change antibiotics into doxycycline and follow-up wound culture


Consult cardiology.


Pulmonary team consult


Consult hematology oncology.


Consult wound care


Labs and medication were reviewed..  Continue same treatment.  Continue with 

symptomatic treatment.  Resume home medication.  Monitor lytes and vitals.  DVT 

and GI prophylaxis.  Further recommendations as per clinical course of the 

patient


DVT prophylaxis: Subcutaneous heparin


GI Prophylaxis: Pepcid


Prognosis is guarded

## 2022-12-07 LAB
ANION GAP SERPL CALC-SCNC: 7 MMOL/L
BASOPHILS # BLD AUTO: 0.1 K/UL (ref 0–0.2)
BASOPHILS NFR BLD AUTO: 0 %
BUN SERPL-SCNC: 17 MG/DL (ref 9–20)
CALCIUM SPEC-MCNC: 7.9 MG/DL (ref 8.4–10.2)
CHLORIDE SERPL-SCNC: 89 MMOL/L (ref 98–107)
CO2 SERPL-SCNC: 39 MMOL/L (ref 22–30)
EOSINOPHIL # BLD AUTO: 0.1 K/UL (ref 0–0.7)
EOSINOPHIL NFR BLD AUTO: 0 %
ERYTHROCYTE [DISTWIDTH] IN BLOOD BY AUTOMATED COUNT: 3.94 M/UL (ref 4.3–5.9)
ERYTHROCYTE [DISTWIDTH] IN BLOOD: 15.6 % (ref 11.5–15.5)
GLUCOSE BLD-MCNC: 134 MG/DL (ref 70–110)
GLUCOSE BLD-MCNC: 191 MG/DL (ref 70–110)
GLUCOSE BLD-MCNC: 246 MG/DL (ref 70–110)
GLUCOSE BLD-MCNC: 260 MG/DL (ref 70–110)
GLUCOSE SERPL-MCNC: 113 MG/DL (ref 74–99)
HCT VFR BLD AUTO: 33.2 % (ref 39–53)
HGB BLD-MCNC: 9.3 GM/DL (ref 13–17.5)
LYMPHOCYTES # SPEC AUTO: 1.7 K/UL (ref 1–4.8)
LYMPHOCYTES NFR SPEC AUTO: 12 %
MCH RBC QN AUTO: 23.5 PG (ref 25–35)
MCHC RBC AUTO-ENTMCNC: 27.9 G/DL (ref 31–37)
MCV RBC AUTO: 84.3 FL (ref 80–100)
MONOCYTES # BLD AUTO: 0.5 K/UL (ref 0–1)
MONOCYTES NFR BLD AUTO: 4 %
NEUTROPHILS # BLD AUTO: 11 K/UL (ref 1.3–7.7)
NEUTROPHILS NFR BLD AUTO: 82 %
PLATELET # BLD AUTO: 617 K/UL (ref 150–450)
POTASSIUM SERPL-SCNC: 4.5 MMOL/L (ref 3.5–5.1)
SODIUM SERPL-SCNC: 135 MMOL/L (ref 137–145)
WBC # BLD AUTO: 13.4 K/UL (ref 3.8–10.6)

## 2022-12-07 RX ADMIN — METHYLPREDNISOLONE SODIUM SUCCINATE SCH MG: 40 INJECTION, POWDER, FOR SOLUTION INTRAMUSCULAR; INTRAVENOUS at 20:49

## 2022-12-07 RX ADMIN — METOPROLOL TARTRATE SCH MG: 25 TABLET, FILM COATED ORAL at 09:06

## 2022-12-07 RX ADMIN — FUROSEMIDE SCH MG: 40 TABLET ORAL at 09:06

## 2022-12-07 RX ADMIN — Medication SCH MG: at 09:06

## 2022-12-07 RX ADMIN — DOCUSATE SODIUM SCH MG: 100 CAPSULE, LIQUID FILLED ORAL at 09:06

## 2022-12-07 RX ADMIN — MORPHINE SULFATE SCH MG: 30 TABLET, FILM COATED, EXTENDED RELEASE ORAL at 17:24

## 2022-12-07 RX ADMIN — FUROSEMIDE SCH MG: 40 TABLET ORAL at 17:25

## 2022-12-07 RX ADMIN — AMPICILLIN SODIUM AND SULBACTAM SODIUM SCH MLS/HR: 2; 1 INJECTION, POWDER, FOR SOLUTION INTRAMUSCULAR; INTRAVENOUS at 23:22

## 2022-12-07 RX ADMIN — METOPROLOL TARTRATE SCH MG: 25 TABLET, FILM COATED ORAL at 20:49

## 2022-12-07 RX ADMIN — INSULIN ASPART SCH UNIT: 100 INJECTION, SOLUTION INTRAVENOUS; SUBCUTANEOUS at 17:23

## 2022-12-07 RX ADMIN — HEPARIN SODIUM SCH UNIT: 5000 INJECTION INTRAVENOUS; SUBCUTANEOUS at 17:23

## 2022-12-07 RX ADMIN — COLLAGENASE SANTYL SCH APPLIC: 250 OINTMENT TOPICAL at 17:25

## 2022-12-07 RX ADMIN — MORPHINE SULFATE SCH MG: 30 TABLET, FILM COATED, EXTENDED RELEASE ORAL at 21:02

## 2022-12-07 RX ADMIN — INSULIN ASPART SCH UNIT: 100 INJECTION, SOLUTION INTRAVENOUS; SUBCUTANEOUS at 20:49

## 2022-12-07 RX ADMIN — FAMOTIDINE SCH MG: 20 TABLET, FILM COATED ORAL at 09:06

## 2022-12-07 RX ADMIN — HEPARIN SODIUM SCH UNIT: 5000 INJECTION INTRAVENOUS; SUBCUTANEOUS at 23:22

## 2022-12-07 RX ADMIN — PREGABALIN SCH MG: 100 CAPSULE ORAL at 20:49

## 2022-12-07 RX ADMIN — IPRATROPIUM BROMIDE AND ALBUTEROL SULFATE SCH ML: .5; 3 SOLUTION RESPIRATORY (INHALATION) at 21:23

## 2022-12-07 RX ADMIN — MORPHINE SULFATE SCH MG: 30 TABLET, FILM COATED, EXTENDED RELEASE ORAL at 09:05

## 2022-12-07 RX ADMIN — FAMOTIDINE SCH MG: 20 TABLET, FILM COATED ORAL at 20:49

## 2022-12-07 RX ADMIN — DOXYCYCLINE SCH MG: 100 CAPSULE ORAL at 20:49

## 2022-12-07 RX ADMIN — INSULIN ASPART SCH: 100 INJECTION, SOLUTION INTRAVENOUS; SUBCUTANEOUS at 06:12

## 2022-12-07 RX ADMIN — PREGABALIN SCH MG: 100 CAPSULE ORAL at 09:06

## 2022-12-07 RX ADMIN — LIDOCAINE SCH PATCH: 50 PATCH TOPICAL at 17:23

## 2022-12-07 RX ADMIN — HEPARIN SODIUM SCH UNIT: 5000 INJECTION INTRAVENOUS; SUBCUTANEOUS at 09:04

## 2022-12-07 RX ADMIN — METHYLPREDNISOLONE SODIUM SUCCINATE SCH MG: 40 INJECTION, POWDER, FOR SOLUTION INTRAMUSCULAR; INTRAVENOUS at 09:06

## 2022-12-07 RX ADMIN — DOCUSATE SODIUM SCH MG: 100 CAPSULE, LIQUID FILLED ORAL at 20:49

## 2022-12-07 RX ADMIN — INSULIN ASPART SCH UNIT: 100 INJECTION, SOLUTION INTRAVENOUS; SUBCUTANEOUS at 12:01

## 2022-12-07 RX ADMIN — LORATADINE SCH MG: 10 TABLET ORAL at 09:06

## 2022-12-07 RX ADMIN — DOXYCYCLINE SCH MG: 100 CAPSULE ORAL at 09:04

## 2022-12-07 RX ADMIN — IPRATROPIUM BROMIDE AND ALBUTEROL SULFATE SCH ML: .5; 3 SOLUTION RESPIRATORY (INHALATION) at 12:43

## 2022-12-07 RX ADMIN — IPRATROPIUM BROMIDE AND ALBUTEROL SULFATE SCH ML: .5; 3 SOLUTION RESPIRATORY (INHALATION) at 16:46

## 2022-12-07 RX ADMIN — IPRATROPIUM BROMIDE AND ALBUTEROL SULFATE SCH ML: .5; 3 SOLUTION RESPIRATORY (INHALATION) at 08:29

## 2022-12-07 NOTE — P.PN
Subjective





History of present illness; patient is a 62-year-old gentleman with past medical

history significant for lung cancer on immunotherapy, congestive heart Failure, 

COPD, asthma who presented to the ER because of worsening shortness of breath 

and swelling of legs.  Patient has been noticing increasing shortness of breath 

on exertion for the last couple of days associated with increased swelling of 

legs.  Patient was on diuretics at home but stated that it was not working and 

he continues to be short of breath.  Patient also currently on antibiotics for 

right foot wound.  Because of this worsening shortness of breath and swelling of

legs patient came to the ER.  Initial lab work showed patient to have a white 

count of 12.9, hemoglobin of 8.8, platelet count of 628, sodium 137, potassium 

4.3, carbon dioxide 38, troponin 0.1.  CTA chest done showed no evidence of 

pulmonary embolism.  It did show pleural thickening and destructive changes in 

the left upper ribs.  Large mass involving the upper limits of chest wall was 

also seen.  Patient was admitted to hospitalist service for further evaluation 

and treatment





12/05/2022


Patient still complaining of from dyspnea he says the same but no chest pain and

no significant cough and


He has chronic left shoulder pain and swelling of the left upper extremity 

related to his cancer


Also he has evidence of right foot infection with no evidence of cellulitis, he 

has diabetic foot ulcer on the sole with black eschar


He is in 2 L oxygen via nasal cannula


Oncology team are on the case and the recommend to resume immunotherapy on 12/15

if he is stable, no recent chemotherapy





12/06/2022


Patient breathing is improving however patient is still hypoxic 88-92% on 4 L 

oxygen via nasal cannula


His with non-STEMI moderate aortic stenosis and CHF, he is on IV Lasix was 

switched to oral dose today.  Cardiologist on the case.


His WBC is 12,000, hemoglobin 8.9.


Pulmonary team were consulted today


Patient informed about his next immunotherapy appointment on 12/15 and vision 

and wife at bedside agreeable.


Patient has home oxygen of 2 L/m 


because of his cancer he is on morphine 3 times a day each make him little bit 

more sleepy which is expected.  Also patient has constipation so Colace and 

senna was added








12/07/2022


Patient states that his breathing is better today patient feels is getting ready

and closer for discharge


Patient still constipated although he taking narcotics he was started on Colace 

and senna yesterday, we will wait another 24 hours.


As per patient and family is okay for him to go to rehab.  Even for short-term 

and then follow up with Dr. Eller.


Also the planned to follow-up with wound center for his right foot.


Patient WBCs 13,000, hemoglobin 9.3, follow-up hemoglobin as an outpatient.


He is saturating 93% on 4 L oxygen, his tachycardia is better


Wound culture is growing and aerobic gram-negative bacilli and an aerobic gram-

positive cocci as well as MSSA in the anaerobic culture, currently his on 

doxycycline causes of this abdominal wound culture we will consult infectious 

disease team


Patient is improving and may be considered for discharge in 24-48 hours





Objective





- Vital Signs


Vital signs: 


                                   Vital Signs











Temp  97.7 F   12/07/22 04:00


 


Pulse  90   12/07/22 12:00


 


Resp  16   12/07/22 12:00


 


BP  115/78   12/07/22 12:00


 


Pulse Ox  93 L  12/07/22 12:00


 


FiO2      








                                 Intake & Output











 12/06/22 12/07/22 12/07/22





 18:59 06:59 18:59


 


Intake Total 420  180


 


Output Total 250  


 


Balance 170  180


 


Intake:   


 


  Oral 420  180


 


Output:   


 


  Urine 250  


 


Other:   


 


  Voiding Method Diaper Diaper Diaper














- Exam





GENERAL: The patient is alert and oriented x3, not in any acute distress. Well 

developed, well nourished. 


HEENT: Pupils are round and equally reacting to light. EOMI. No scleral icterus.

No conjunctival pallor. Normocephalic, atraumatic. No pharyngeal erythema. No 

thyromegaly. 


CARDIOVASCULAR: S1 and S2 present. No murmurs, rubs, or gallops. 


-PULMONARY: Chest is clear to auscultation, no wheezing .  Bilateral basal or c

rackles. 


ABDOMEN: Soft, nontender, nondistended, normoactive bowel sounds. No palpable 

organomegaly. 


MUSCULOSKELETAL: No joint swelling or deformity. 


-EXTREMITIES: No cyanosis, clubbing, or pedal mild B/L Castillo edema.  Left upper 

extremities as well as with limitation of movement [chronic] right foot ulcer on

 the sole with black eschar on the top, no surrounding cellulitis


NEUROLOGICAL: Gross neurological examination did not reveal any focal deficits. 


SKIN: No rashes. no petechiae.





- Labs


CBC & Chem 7: 


                                 12/07/22 07:30





                                 12/07/22 07:30


Labs: 


                  Abnormal Lab Results - Last 24 Hours (Table)











  12/04/22 12/05/22 12/06/22 Range/Units





  05:16 10:27 20:11 


 


WBC     (3.8-10.6)  k/uL


 


RBC     (4.30-5.90)  m/uL


 


Hgb     (13.0-17.5)  gm/dL


 


Hct     (39.0-53.0)  %


 


MCH     (25.0-35.0)  pg


 


MCHC     (31.0-37.0)  g/dL


 


RDW     (11.5-15.5)  %


 


Plt Count     (150-450)  k/uL


 


Neutrophils #     (1.3-7.7)  k/uL


 


Haptoglobin   320.0 H   (31.2-198.0)  mg/dL


 


Sodium     (137-145)  mmol/L


 


Chloride     ()  mmol/L


 


Carbon Dioxide     (22-30)  mmol/L


 


Creatinine     (0.66-1.25)  mg/dL


 


Glucose     (74-99)  mg/dL


 


POC Glucose (mg/dL)    138 H  ()  mg/dL


 


Calcium     (8.4-10.2)  mg/dL


 


TIBC  196 L    (228-460)  ug/dL


 


% Saturation  7.55 L    (15.00-50.00)   


 


Transferrin  140.0 L    (204.0-354.0)  mg/dL














  12/07/22 12/07/22 12/07/22 Range/Units





  06:00 07:30 07:30 


 


WBC   13.4 H   (3.8-10.6)  k/uL


 


RBC   3.94 L   (4.30-5.90)  m/uL


 


Hgb   9.3 L   (13.0-17.5)  gm/dL


 


Hct   33.2 L   (39.0-53.0)  %


 


MCH   23.5 L   (25.0-35.0)  pg


 


MCHC   27.9 L   (31.0-37.0)  g/dL


 


RDW   15.6 H   (11.5-15.5)  %


 


Plt Count   617 H   (150-450)  k/uL


 


Neutrophils #   11.0 H   (1.3-7.7)  k/uL


 


Haptoglobin     (31.2-198.0)  mg/dL


 


Sodium    135 L  (137-145)  mmol/L


 


Chloride    89 L  ()  mmol/L


 


Carbon Dioxide    39 H  (22-30)  mmol/L


 


Creatinine    0.53 L  (0.66-1.25)  mg/dL


 


Glucose    113 H  (74-99)  mg/dL


 


POC Glucose (mg/dL)  134 H    ()  mg/dL


 


Calcium    7.9 L  (8.4-10.2)  mg/dL


 


TIBC     (228-460)  ug/dL


 


% Saturation     (15.00-50.00)   


 


Transferrin     (204.0-354.0)  mg/dL














  12/07/22 Range/Units





  11:30 


 


WBC   (3.8-10.6)  k/uL


 


RBC   (4.30-5.90)  m/uL


 


Hgb   (13.0-17.5)  gm/dL


 


Hct   (39.0-53.0)  %


 


MCH   (25.0-35.0)  pg


 


MCHC   (31.0-37.0)  g/dL


 


RDW   (11.5-15.5)  %


 


Plt Count   (150-450)  k/uL


 


Neutrophils #   (1.3-7.7)  k/uL


 


Haptoglobin   (31.2-198.0)  mg/dL


 


Sodium   (137-145)  mmol/L


 


Chloride   ()  mmol/L


 


Carbon Dioxide   (22-30)  mmol/L


 


Creatinine   (0.66-1.25)  mg/dL


 


Glucose   (74-99)  mg/dL


 


POC Glucose (mg/dL)  191 H  ()  mg/dL


 


Calcium   (8.4-10.2)  mg/dL


 


TIBC   (228-460)  ug/dL


 


% Saturation   (15.00-50.00)   


 


Transferrin   (204.0-354.0)  mg/dL








                      Microbiology - Last 24 Hours (Table)











 12/03/22 21:16 Gram Stain - Final





 Foot - Right Wound Culture - Final





    Staphylococcus aureus














Assessment and Plan


Assessment: 





Acute onset congestive heart failure.


None STEMI with elevated troponin


Moderate aortic stenosis


Lung cancer with multiple nodular pulmonary metastasis throughout both lung 

fields


Chronic left shoulder pain and swelling of the left upper extremity related to 

his history of cancer


Right foot infection with staph


Acute on chronic hypoxic respiratory failure


Right foot wound


Lung cancer


Diabetes mellitus


Hypertension





Plan: 





Continue with, switched to oral dose on 12/7 six


Strict I's and O's


Daily weights.


Monitor renal functions


Change antibiotics into doxycycline and follow-up wound culture


Consult cardiology.


Pulmonary team consult


Consult hematology oncology.


Infectious disease consult


Consult wound care


Labs and medication were reviewed..  Continue same treatment.  Continue with 

symptomatic treatment.  Resume home medication.  Monitor lytes and vitals.  DVT 

and GI prophylaxis.  Further recommendations as per clinical course of the 

patient


DVT prophylaxis: Subcutaneous heparin


GI Prophylaxis: Pepcid


Prognosis is guarded 


plan discussed with patient and family including wife and daughter at bedside 

and all their questions answered and they're agreeable with the current plan

## 2022-12-07 NOTE — P.PN
Subjective


Progress Note Date: 12/07/22


Principal diagnosis: 





Acute on chronic hypoxic respiratory failure appeared to be related to 

combination diastolic heart failure and COPD exacerbation





Acute COPD exacerbation





Lung mass with invasion to chest wall distraction involving the ribs





Metastatic stage IV lung cancer





Anasarca and fluid overload





Bilateral small pleural effusion more so on the right side compared left side





12/07/2022, patient seen and evaluated examined during the rounds labs reviewed 

medications reviewed, shortness of breath slightly improved however remains on 4

L oxygen ongoing dry cough is present, left upper extremity is weak swelling in 

second due to lymphedema, and labs from today's reviewed white cell count is 

13,400, hemoglobin hematocrit 9.3/33, platelet count 6 and 17,000, BUN/crea

tinine 17/0.53 sodium is 135 potassium 4.5 CO2 is 39 which is progressively 

declining now





Patient is a 62-year-old male with prior medical history of lung cancer has been

on immunotherapy with Dr. Schaefer also has a history of chronic hypoxic respiratory

failure on home oxygen 2 L history of smoking and nicotine use hypertension 

hypertensive cardiovascular disease.  Patient presented into the hospital with 

increasing shortness of breath of several day duration as well as increasing 

lower extremity swelling currently patient has been on IV Lasix switched to by 

mouth, and responding well in addition patient has been on bronchodilator 

doxycycline and his home medications, echocardiogram revealed normal LV function

with moderate aortic stenosis with a peak gradient is 37 and a mean gradient 22 

and mild mitral stenosis patient has been in negative balance.  Chest x-ray on 

admission revealed bilateral pulmonary infiltrate with pleural thickening 

increase more so on the right side compared to left side, venous Doppler study 

of the negative for DVT, computed tomography scan of the chest revealed no 

evidence of pulmonary embolism, pleural thickening and destructive changes noted

in the left upper lateral leads along with large mass in the upper lateral chest

wall, and multiple nodular nodular masses bilaterally in lung field with right-

sided pleural effusion, effusion has increased compared to prior exam in 

November 2022








Objective





- Vital Signs


Vital signs: 


                                   Vital Signs











Temp  97.7 F   12/07/22 04:00


 


Pulse  101 H  12/07/22 12:55


 


Resp  18   12/07/22 12:55


 


BP  115/78   12/07/22 12:00


 


Pulse Ox  93 L  12/07/22 12:00


 


FiO2      








                                 Intake & Output











 12/06/22 12/07/22 12/07/22





 18:59 06:59 18:59


 


Intake Total 420  180


 


Output Total 250  


 


Balance 170  180


 


Intake:   


 


  Oral 420  180


 


Output:   


 


  Urine 250  


 


Other:   


 


  Voiding Method Diaper Diaper Diaper














- Exam


- Constitutional


General appearance: average body habitus, cooperative, disheveled, mild distress





- EENT


Eyes: EOMI, PERRLA


ENT: normal oropharynx


Ears: bilateral: normal





- Neck


Carotids: bilateral: upstroke normal





- Respiratory


Respiratory: bilateral: diminished, dullness (Bilaterally in the bases)





- Cardiovascular


Rhythm: regular


Heart sounds: normal: S1, S2





- Gastrointestinal


General gastrointestinal: hyperactive bowel sounds





- Integumentary





Bilateral pedal edema with chronic skin changes


Integumentary: decreased turgor





- Neurologic


Neurologic: CNII-XII intact





- Musculoskeletal


Musculoskeletal: gait normal, generalized weakness, strength equal bilaterally





- Psychiatric


Psychiatric: A&O x's 3, appropriate affect, intact judgment & insight











- Labs


CBC & Chem 7: 


                                 12/07/22 07:30





                                 12/07/22 07:30


Labs: 


                  Abnormal Lab Results - Last 24 Hours (Table)











  12/05/22 12/06/22 12/07/22 Range/Units





  10:27 20:11 06:00 


 


WBC     (3.8-10.6)  k/uL


 


RBC     (4.30-5.90)  m/uL


 


Hgb     (13.0-17.5)  gm/dL


 


Hct     (39.0-53.0)  %


 


MCH     (25.0-35.0)  pg


 


MCHC     (31.0-37.0)  g/dL


 


RDW     (11.5-15.5)  %


 


Plt Count     (150-450)  k/uL


 


Neutrophils #     (1.3-7.7)  k/uL


 


Haptoglobin  320.0 H    (31.2-198.0)  mg/dL


 


Sodium     (137-145)  mmol/L


 


Chloride     ()  mmol/L


 


Carbon Dioxide     (22-30)  mmol/L


 


Creatinine     (0.66-1.25)  mg/dL


 


Glucose     (74-99)  mg/dL


 


POC Glucose (mg/dL)   138 H  134 H  ()  mg/dL


 


Calcium     (8.4-10.2)  mg/dL














  12/07/22 12/07/22 12/07/22 Range/Units





  07:30 07:30 11:30 


 


WBC  13.4 H    (3.8-10.6)  k/uL


 


RBC  3.94 L    (4.30-5.90)  m/uL


 


Hgb  9.3 L    (13.0-17.5)  gm/dL


 


Hct  33.2 L    (39.0-53.0)  %


 


MCH  23.5 L    (25.0-35.0)  pg


 


MCHC  27.9 L    (31.0-37.0)  g/dL


 


RDW  15.6 H    (11.5-15.5)  %


 


Plt Count  617 H    (150-450)  k/uL


 


Neutrophils #  11.0 H    (1.3-7.7)  k/uL


 


Haptoglobin     (31.2-198.0)  mg/dL


 


Sodium   135 L   (137-145)  mmol/L


 


Chloride   89 L   ()  mmol/L


 


Carbon Dioxide   39 H   (22-30)  mmol/L


 


Creatinine   0.53 L   (0.66-1.25)  mg/dL


 


Glucose   113 H   (74-99)  mg/dL


 


POC Glucose (mg/dL)    191 H  ()  mg/dL


 


Calcium   7.9 L   (8.4-10.2)  mg/dL








                      Microbiology - Last 24 Hours (Table)











 12/03/22 21:16 Anaerobic Culture - Final





 Foot - Right    Anaerobic Gm Negative Bacilli





    Anaerobic Gram Positive Cocci


 


 12/03/22 21:16 Gram Stain - Final





 Foot - Right Wound Culture - Final





    Staphylococcus aureus














Assessment and Plan


Assessment: 





Acute on chronic hypoxic respiratory failure appeared to be related to 

combination diastolic heart failure and COPD exacerbation





Acute COPD exacerbation





Lung mass with invasion to chest wall distraction involving the ribs





Metastatic stage IV lung cancer





Anasarca and fluid overload





Bilateral small pleural effusion more so on the right side compared left side





Chronic left upper extremity lymphedema in weakness


Plan: 





Continue bronchodilators





Antibiotics





Diuretics





Supplemental oxygen, titrated oxygen down as tolerated





IV steroids





Further plan of care as per clinical response of the patient





Continue to raise left upper extremity


Time with Patient: Greater than 30

## 2022-12-07 NOTE — P.PN
Subjective


This is a 62-year-old male with a past medical history of lung cancer on 

palliative therapy follows with Dr. Eller, asthma, type 2 diabetes, hyperten

dada, COPD, former tobacco use.  He does not follow with a cardiologist.  We are

consulted for elevated troponin.  Patient presents emergency department with 

worsening shortness of breath and dyspnea on exertion and lower extremity edema.

There was concern for congestive heart failure and patient was started on IV 

Lasix.  He underwent an echocardiogram that revealed normal LV systolic 

function, moderate aortic stenosis with a peak gradient of 37 mmHg, mean 

gradient of 22 mmHg, mild mitral stenosis.





Patient seen and examined at bedside, no acute distress. Heart rates have 

improved. His shortness of breath has improved, LE edema has improved as well. 

He was started on PO Lasix yesterday.  /57 HR 80s-low 100s. He is Also 

being treated for an acute COPD exacerbation.





GENERAL: In no acute distress.


NECK: Supple without JVD


LUNGS: Breath sounds diminished in bases to auscultation bilaterally. 

Respiration equal and unlabored.  No wheezes, rales or rhonchi.


HEART: Regular rate and rhythm without murmurs, rubs or gallops. S1 and S2 

heard.


EXTREMITIES: Normal range of motion, 1+bilateral lower extremity edema.  No 

clubbing or cyanosis. Peripheral pulses intact.





ASSESSMENT


Acute on chronic heart failure with preserved ejection fraction


COPD exacerbation


Mildly elevated troponin, unclear significance


Lung carcinoma, on immunotherapy, metastatic 


Hypertension


COPD


Former tobacco use


Type 2 diabetes


Nonhealing ulceration of right foot


Diabetic foot ulcer





PLAN


Continue PO Lasix 40mg BID 


Monitor I/Os, daily weights, renal function and electrolytes, while inpatient 


Continue metoprolol tartrate 25mg BID


Patient is currently stable from a cardiology perspective 


Please re-consult if needed. 





Nurse Practitioner note has been reviewed, I agree with a documented findings 

and plan of care.  Patient was seen and examined.





Objective





- Vital Signs


Vital signs: 


                                   Vital Signs











Temp  97.7 F   12/07/22 04:00


 


Pulse  88   12/07/22 04:00


 


Resp  20   12/07/22 04:00


 


BP  106/68   12/07/22 04:00


 


Pulse Ox  96   12/07/22 04:00


 


FiO2      








                                 Intake & Output











 12/06/22 12/07/22 12/07/22





 18:59 06:59 18:59


 


Intake Total 420  


 


Output Total 250  


 


Balance 170  


 


Intake:   


 


  Oral 420  


 


Output:   


 


  Urine 250  


 


Other:   


 


  Voiding Method Diaper Diaper 














- Labs


CBC & Chem 7: 


                                 12/07/22 07:30





                                 12/07/22 07:30


Labs: 


                  Abnormal Lab Results - Last 24 Hours (Table)











  12/04/22 12/05/22 12/06/22 Range/Units





  05:16 10:27 08:29 


 


WBC    12.3 H  (3.8-10.6)  k/uL


 


RBC    3.73 L  (4.30-5.90)  m/uL


 


Hgb    8.9 L  (13.0-17.5)  gm/dL


 


Hct    32.0 L  (39.0-53.0)  %


 


MCH    23.9 L  (25.0-35.0)  pg


 


MCHC    27.9 L  (31.0-37.0)  g/dL


 


RDW     (11.5-15.5)  %


 


Plt Count    528 H  (150-450)  k/uL


 


Neutrophils #     (1.3-7.7)  k/uL


 


Haptoglobin   320.0 H   (31.2-198.0)  mg/dL


 


Chloride     ()  mmol/L


 


Carbon Dioxide     (22-30)  mmol/L


 


Creatinine     (0.66-1.25)  mg/dL


 


Glucose     (74-99)  mg/dL


 


POC Glucose (mg/dL)     ()  mg/dL


 


Calcium     (8.4-10.2)  mg/dL


 


TIBC  196 L    (228-460)  ug/dL


 


% Saturation  7.55 L    (15.00-50.00)   


 


Transferrin  140.0 L    (204.0-354.0)  mg/dL














  12/06/22 12/06/22 12/06/22 Range/Units





  08:29 11:49 20:11 


 


WBC     (3.8-10.6)  k/uL


 


RBC     (4.30-5.90)  m/uL


 


Hgb     (13.0-17.5)  gm/dL


 


Hct     (39.0-53.0)  %


 


MCH     (25.0-35.0)  pg


 


MCHC     (31.0-37.0)  g/dL


 


RDW     (11.5-15.5)  %


 


Plt Count     (150-450)  k/uL


 


Neutrophils #     (1.3-7.7)  k/uL


 


Haptoglobin     (31.2-198.0)  mg/dL


 


Chloride  89 L    ()  mmol/L


 


Carbon Dioxide  46 H*    (22-30)  mmol/L


 


Creatinine  0.51 L    (0.66-1.25)  mg/dL


 


Glucose  100 H    (74-99)  mg/dL


 


POC Glucose (mg/dL)   205 H  138 H  ()  mg/dL


 


Calcium  7.7 L    (8.4-10.2)  mg/dL


 


TIBC     (228-460)  ug/dL


 


% Saturation     (15.00-50.00)   


 


Transferrin     (204.0-354.0)  mg/dL














  12/07/22 12/07/22 Range/Units





  06:00 07:30 


 


WBC   13.4 H  (3.8-10.6)  k/uL


 


RBC   3.94 L  (4.30-5.90)  m/uL


 


Hgb   9.3 L  (13.0-17.5)  gm/dL


 


Hct   33.2 L  (39.0-53.0)  %


 


MCH   23.5 L  (25.0-35.0)  pg


 


MCHC   27.9 L  (31.0-37.0)  g/dL


 


RDW   15.6 H  (11.5-15.5)  %


 


Plt Count   617 H  (150-450)  k/uL


 


Neutrophils #   11.0 H  (1.3-7.7)  k/uL


 


Haptoglobin    (31.2-198.0)  mg/dL


 


Chloride    ()  mmol/L


 


Carbon Dioxide    (22-30)  mmol/L


 


Creatinine    (0.66-1.25)  mg/dL


 


Glucose    (74-99)  mg/dL


 


POC Glucose (mg/dL)  134 H   ()  mg/dL


 


Calcium    (8.4-10.2)  mg/dL


 


TIBC    (228-460)  ug/dL


 


% Saturation    (15.00-50.00)   


 


Transferrin    (204.0-354.0)  mg/dL








                      Microbiology - Last 24 Hours (Table)











 12/03/22 21:16 Gram Stain - Final





 Foot - Right Wound Culture - Final





    Staphylococcus aureus

## 2022-12-07 NOTE — P.PN
Subjective


Progress Note Date: 12/07/22


Principal diagnosis: 





CHF exacerbation.  Met NSCLC





In f/u today pt is Able to sit up at the bedside independently, he is on 4 L 

nasal cannula.  He is more alert and interactive today.  Patient states he wants

to go home, family is reporting they cannot manage him at home yet.  He is 

eating and drinking small amounts.  Shortness of breath is stable.  Denies any 

fevers, nausea or diarrhea.





Objective





- Vital Signs


Vital signs: 


                                   Vital Signs











Temp  97.7 F   12/07/22 04:00


 


Pulse  101 H  12/07/22 16:56


 


Resp  18   12/07/22 16:56


 


BP  115/78   12/07/22 12:00


 


Pulse Ox  93 L  12/07/22 12:00


 


FiO2      








                                 Intake & Output











 12/06/22 12/07/22 12/07/22





 18:59 06:59 18:59


 


Intake Total 420  360


 


Output Total 250  


 


Balance 170  360


 


Intake:   


 


  Oral 420  360


 


Output:   


 


  Urine 250  


 


Other:   


 


  Voiding Method Diaper Diaper Diaper














- Constitutional


General appearance: Present: average body habitus, cooperative, no acute 

distress





- EENT


Eyes: Present: anicteric sclerae, EOMI


ENT: Present: hearing grossly normal





- Respiratory


Respiratory: bilateral: diminished





- Cardiovascular


Heart sounds: normal: S1, S2





- Peripheral edema


  ** leg


Peripheral Edema: bilateral: 2+





- Neurologic


Neurologic: Present: CNII-XII intact





- Musculoskeletal


Musculoskeletal: Present: generalized weakness





- Psychiatric


Psychiatric: Present: A&O x's 3, appropriate affect, intact judgment & insight





- Labs


CBC & Chem 7: 


                                 12/07/22 07:30





                                 12/07/22 07:30


Labs: 


                  Abnormal Lab Results - Last 24 Hours (Table)











  12/06/22 12/07/22 12/07/22 Range/Units





  20:11 06:00 07:30 


 


WBC    13.4 H  (3.8-10.6)  k/uL


 


RBC    3.94 L  (4.30-5.90)  m/uL


 


Hgb    9.3 L  (13.0-17.5)  gm/dL


 


Hct    33.2 L  (39.0-53.0)  %


 


MCH    23.5 L  (25.0-35.0)  pg


 


MCHC    27.9 L  (31.0-37.0)  g/dL


 


RDW    15.6 H  (11.5-15.5)  %


 


Plt Count    617 H  (150-450)  k/uL


 


Neutrophils #    11.0 H  (1.3-7.7)  k/uL


 


Sodium     (137-145)  mmol/L


 


Chloride     ()  mmol/L


 


Carbon Dioxide     (22-30)  mmol/L


 


Creatinine     (0.66-1.25)  mg/dL


 


Glucose     (74-99)  mg/dL


 


POC Glucose (mg/dL)  138 H  134 H   ()  mg/dL


 


Calcium     (8.4-10.2)  mg/dL














  12/07/22 12/07/22 12/07/22 Range/Units





  07:30 11:30 16:43 


 


WBC     (3.8-10.6)  k/uL


 


RBC     (4.30-5.90)  m/uL


 


Hgb     (13.0-17.5)  gm/dL


 


Hct     (39.0-53.0)  %


 


MCH     (25.0-35.0)  pg


 


MCHC     (31.0-37.0)  g/dL


 


RDW     (11.5-15.5)  %


 


Plt Count     (150-450)  k/uL


 


Neutrophils #     (1.3-7.7)  k/uL


 


Sodium  135 L    (137-145)  mmol/L


 


Chloride  89 L    ()  mmol/L


 


Carbon Dioxide  39 H    (22-30)  mmol/L


 


Creatinine  0.53 L    (0.66-1.25)  mg/dL


 


Glucose  113 H    (74-99)  mg/dL


 


POC Glucose (mg/dL)   191 H  260 H  ()  mg/dL


 


Calcium  7.9 L    (8.4-10.2)  mg/dL








                      Microbiology - Last 24 Hours (Table)











 12/03/22 21:16 Anaerobic Culture - Final





 Foot - Right    Anaerobic Gm Negative Bacilli





    Anaerobic Gram Positive Cocci


 


 12/03/22 21:16 Gram Stain - Final





 Foot - Right Wound Culture - Final





    Staphylococcus aureus














Assessment and Plan


(1) CHF (congestive heart failure)


Current Visit: Yes   Status: Acute   Priority: High   Code(s): I50.9 - HEART 

FAILURE, UNSPECIFIED   SNOMED Code(s): 58163241


   





(2) Squamous cell lung cancer


Current Visit: No   Status: Chronic   Priority: Medium   Code(s): C34.90 - 

MALIGNANT NEOPLASM OF UNSP PART OF UNSP BRONCHUS OR LUNG   SNOMED Code(s): 

179504307


   


Plan: 





Pt continues to improve from a CHF and COPD exacerbation standpoint.  

Significant reduction of fluid retention in the face, SOB is stable.  Cont 

treatment per Cardiology and Pulmonary


Wound care is following for rt heel ulceration.


WBC and plt are stable/little worse today.  No acute intervention.  


Pt had treatment f/u CT scan recently.  Reviewed with Dr. Eller.  There were 

some areas that looked slightly worse, some slightly better, no new areas.  

Stable disease.  Continue with op depot is the plan.  All of the patient and his

family's questions were answered to the best of my ability.


Family is stating they're not certain they're going to be able to care for him 

at home.  Patient is not able to do much by himself at this time.  If patient 

goes to rehabilitation cancer treatment will be held until he is discharged.  We

also discussed that if the patient ends up going home prior to his next 

treatment, they need to consider if they feel that he is strong enough to resume

treatment.  If not, encourage them to delay the next cycle until patient is in 

better shape for treatment.  They verbalized understanding.


Follow-up appointment with Dr. Eller will be adjusted as patient has CAT scan 

results, does not need an appointment this Friday.

## 2022-12-07 NOTE — P.CONS
History of Present Illness





- Reason for Consult


Consult date: 12/07/22


Diabetic ulcer of the right foot


Requesting physician: Jordon E Sheet





- Chief Complaint


Nonhealing wound to the right foot x weeks





- History of Present Illness


Patient is a 62-year-old  male with a past medical history taken for 

diabetes mellitus presenting to the hospital 4 days ago on 12/3/2022 for 

evaluation of increasing shortness of breath and lower extremity swelling 

patient also have wound on the plantar aspect of the right foot which has been 

there for for more than a week apparently started with a blister that has 

ruptured leading to this ulceration formation has been evaluated by  primary 

care physician who advised Silvadene cream and the patient was referred to 

podiatrist will subsequently refer the patient to wound care however the patient

has not been seen there patient on presentation to the hospital did have x-rays 

of the right foot which did not show any bony changes patient did have a local 

wound cultures obtained on 12/3/2022 which were finalized yesterday on 12/6/2022

with MSSA and anaerobes infectious disease was consulted this evening more than 

24 hours after the culture finalized for management of his wound with a positive

culture is currently on oral Keflex local wound care has been with the Santyl.


The patient has been afebrile during this hospital stay however he did have 

elevated white count patient did have diabetic neuropathy and denies significant

pain to the right foot plantar wound area or any foul-smelling drainage did have

swelling to the foot but no significant redness








Review of Systems


Positive point has been  mentioned in the HPI rest of the systems are negative








Past Medical History


Past Medical History: Asthma, Cancer, Heart Failure, COPD, Diabetes Mellitus, 

Hypertension


Additional Past Medical History / Comment(s): Uses home O2 as needed only. 

Squamous cell carcinoma of the lung, rib lesion and lymph adenopathy, shoulder 

pain related to lung mass. Pancous tumor left apex


History of Any Multi-Drug Resistant Organisms: MRSA


Year Discovered:: 6/23/16


MDRO Source:: Left Foot


Past Surgical History: Hernia Repair, Orthopedic Surgery, Tonsillectomy


Additional Past Surgical History / Comment(s): lt. 5th toe amp


Past Anesthesia/Blood Transfusion Reactions: No Reported Reaction


Past Psychological History: Anxiety


Smoking Status: Former smoker


Past Alcohol Use History: None Reported


Past Drug Use History: None Reported


Additional Drug Use History / Comment(s): quit drinking 10 years ago in 2012, 

quit smoking Sept 2021





- Past Family History


  ** Sister(s)


Family Medical History: Cancer





  ** Father


Family Medical History: Congestive Heart Failure (CHF), Diabetes Mellitus





  ** Mother


Family Medical History: Renal Disease





Medications and Allergies


                                Home Medications











 Medication  Instructions  Recorded  Confirmed  Type


 


Albuterol Sulfate [Ventolin HFA] 2 puff INHALATION RT-Q4H PRN 09/03/21 12/03/22 

History


 


ondansetron HCL [Zofran] 8 mg PO TID PRN 09/17/22 12/03/22 History


 


Loratadine [Claritin] 10 mg PO DAILY #30 tab 09/18/22 12/03/22 Rx


 


Ferrous Sulfate [Iron (65  mg PO DAILY 12/03/22 12/03/22 History





Elemental)]    


 


Ipratropium-Albuterol Nebulize 3 ml INHALATION RT-QID PRN 12/03/22 12/03/22 

History





[Duoneb 0.5 mg-3 mg/3 ml Soln]    


 


Multivitamins, Thera [Multivitamin 1 tab PO DAILY 12/03/22 12/03/22 History





(formulary)]    


 


Cefepime [Maxipime] 2 gm IVPB Q8H  each 12/14/22  Rx


 


Collagenase [Santyl Ointment] 1 applic TOPICAL DAILY  each 12/14/22  Rx


 


Docusate [Colace] 100 mg PO BID  cap 12/14/22  Rx


 


Famotidine [Pepcid] 20 mg PO BID  tab 12/14/22  Rx


 


Furosemide [Lasix] 40 mg PO BID@0900,1600  tab 12/14/22  Rx


 


HYDROcodone/APAP 5-325MG [Norco 1 each PO Q6HR PRN #4 tab 12/14/22  Rx





5-325]    


 


INSULIN ASPART (NovoLOG) [NovoLOG 0 unit SQ ACHS  each 12/14/22  Rx





(formulary)]    


 


INSULIN ASPART (NovoLOG) [NovoLOG 3 unit SQ AC-TID  each 12/14/22  Rx





(formulary)]    


 


Insulin Detemir (Levemir) [Levemir] 17 unit SQ HS  each 12/14/22  Rx


 


Lidocaine 5% Patch [Lidoderm 5% 2 patch TOPICAL DAILY@1500  patch 12/14/22  Rx





Patch]    


 


Metoprolol Tartrate [Lopressor] 25 mg PO BID  tab 12/14/22  Rx


 


Morphine Sulfate [Ms Contin] 30 mg PO TID #3 tab 12/14/22  Rx


 


Pregabalin [Lyrica] 200 mg PO BID #2 cap 12/14/22  Rx


 


Sennosides [Senokot] 8.6 mg PO BID PRN  tab 12/14/22  Rx


 


metroNIDAZOLE [Flagyl] 500 mg PO TID  tab 12/14/22  Rx


 


predniSONE 0 mg PO AS DIRECTED 16 Days #38 tab 12/14/22  Rx








                                    Allergies











Allergy/AdvReac Type Severity Reaction Status Date / Time


 


loratadine [From Claritin-D] AdvReac  Confusion Verified 12/03/22 21:44


 


pseudoephedrine AdvReac  Confusion Verified 12/03/22 21:44





[From Claritin-D]     














Physical Exam


Vitals: 


                                   Vital Signs











  Temp Pulse Pulse Pulse Resp BP Pulse Ox


 


 12/07/22 16:56   101 H    18  


 


 12/07/22 16:46   101 H    18  


 


 12/07/22 12:55   101 H    18  


 


 12/07/22 12:43   100    18  


 


 12/07/22 12:00     90  16  115/78  93 L


 


 12/07/22 08:38   100    18  


 


 12/07/22 08:29   99    18  


 


 12/07/22 08:00     104 H  16  114/57  90 L


 


 12/07/22 04:00  97.7 F   88   20  106/68  96


 


 12/07/22 01:53    90   20  


 


 12/07/22 00:00    90   20  104/62  94 L


 


 12/06/22 20:49   100     


 


 12/06/22 20:39   100     


 


 12/06/22 20:00  98.0 F   88   20  102/67  95








                                Intake and Output











 12/07/22 12/07/22 12/07/22





 06:59 14:59 22:59


 


Intake Total  360 


 


Balance  360 


 


Intake:   


 


  Oral  360 


 


Other:   


 


  Voiding Method Diaper Diaper 











GENERAL DESCRIPTION: Middle-aged male lying in bed, no distress. No tachypnea or

accessory muscle of respiration use.


HEENT: Shows Pallor , no scleral icterus. Oral mucous membrane is dry. No 

pharyngeal erythema or thrush


NECK: Trachea central, no thyromegaly.


LUNGS: Unlabored breathing.  Decrease breath with a base.


HEART: S1, S2, regular rate and rhythm. No loud murmur


ABDOMEN: Soft, no tenderness , guarding or rigidity, no organomegaly


EXTREMITIES: Right foot plantar wound with some slough tissue no significant 

surrounding redness or foul-smelling drainage.


SKIN: No rash, no masses palpable.


NEUROLOGICAL: The patient is awake, alert, oriented x3, mood and affect normal.

















Results


CBC & Chem 7: 


                                 12/14/22 07:24





                                 12/14/22 07:24


Labs: 


                  Abnormal Lab Results - Last 24 Hours (Table)











  12/06/22 12/07/22 12/07/22 Range/Units





  20:11 06:00 07:30 


 


WBC    13.4 H  (3.8-10.6)  k/uL


 


RBC    3.94 L  (4.30-5.90)  m/uL


 


Hgb    9.3 L  (13.0-17.5)  gm/dL


 


Hct    33.2 L  (39.0-53.0)  %


 


MCH    23.5 L  (25.0-35.0)  pg


 


MCHC    27.9 L  (31.0-37.0)  g/dL


 


RDW    15.6 H  (11.5-15.5)  %


 


Plt Count    617 H  (150-450)  k/uL


 


Neutrophils #    11.0 H  (1.3-7.7)  k/uL


 


Sodium     (137-145)  mmol/L


 


Chloride     ()  mmol/L


 


Carbon Dioxide     (22-30)  mmol/L


 


Creatinine     (0.66-1.25)  mg/dL


 


Glucose     (74-99)  mg/dL


 


POC Glucose (mg/dL)  138 H  134 H   ()  mg/dL


 


Calcium     (8.4-10.2)  mg/dL














  12/07/22 12/07/22 12/07/22 Range/Units





  07:30 11:30 16:43 


 


WBC     (3.8-10.6)  k/uL


 


RBC     (4.30-5.90)  m/uL


 


Hgb     (13.0-17.5)  gm/dL


 


Hct     (39.0-53.0)  %


 


MCH     (25.0-35.0)  pg


 


MCHC     (31.0-37.0)  g/dL


 


RDW     (11.5-15.5)  %


 


Plt Count     (150-450)  k/uL


 


Neutrophils #     (1.3-7.7)  k/uL


 


Sodium  135 L    (137-145)  mmol/L


 


Chloride  89 L    ()  mmol/L


 


Carbon Dioxide  39 H    (22-30)  mmol/L


 


Creatinine  0.53 L    (0.66-1.25)  mg/dL


 


Glucose  113 H    (74-99)  mg/dL


 


POC Glucose (mg/dL)   191 H  260 H  ()  mg/dL


 


Calcium  7.9 L    (8.4-10.2)  mg/dL








                      Microbiology - Last 24 Hours (Table)











 12/03/22 21:16 Anaerobic Culture - Final





 Foot - Right    Anaerobic Gm Negative Bacilli





    Anaerobic Gram Positive Cocci


 


 12/03/22 21:16 Gram Stain - Final





 Foot - Right Wound Culture - Final





    Staphylococcus aureus














Assessment and Plan


(1) Diabetic foot infection


Status: Acute   Code(s): E11.628 - TYPE 2 DIABETES MELLITUS WITH OTHER SKIN 

COMPLICATIONS; L08.9 - LOCAL INFECTION OF THE SKIN AND SUBCUTANEOUS TISSUE, UNSP

  SNOMED Code(s): 935035711


   


Plan: 


1patient with right diabetic foot wound with secondary infection cultures are 

growing MSSA and anaerobes patient did have a wound with slough tissue and conc

narcisa for underlying deep infection.


2we will obtain vascular surgery evaluation for debridement and deep culture.


3local wound care to continue with the Santyl.


4discontinue Keflex and start the patient on Unasyn 3 g per 6-hour.


We will follow on clinical condition and cultures to further adjust medication 

if needed


Thank you for this consultation will follow this patient along with you





Time with Patient: Greater than 30

## 2022-12-08 LAB
GLUCOSE BLD-MCNC: 196 MG/DL (ref 70–110)
GLUCOSE BLD-MCNC: 203 MG/DL (ref 70–110)
GLUCOSE BLD-MCNC: 229 MG/DL (ref 70–110)
GLUCOSE BLD-MCNC: 230 MG/DL (ref 70–110)

## 2022-12-08 PROCEDURE — 0JBQ0ZZ EXCISION OF RIGHT FOOT SUBCUTANEOUS TISSUE AND FASCIA, OPEN APPROACH: ICD-10-PCS

## 2022-12-08 RX ADMIN — IPRATROPIUM BROMIDE AND ALBUTEROL SULFATE SCH ML: .5; 3 SOLUTION RESPIRATORY (INHALATION) at 07:35

## 2022-12-08 RX ADMIN — IPRATROPIUM BROMIDE AND ALBUTEROL SULFATE SCH ML: .5; 3 SOLUTION RESPIRATORY (INHALATION) at 19:39

## 2022-12-08 RX ADMIN — INSULIN ASPART SCH UNIT: 100 INJECTION, SOLUTION INTRAVENOUS; SUBCUTANEOUS at 06:17

## 2022-12-08 RX ADMIN — Medication SCH MG: at 09:19

## 2022-12-08 RX ADMIN — MORPHINE SULFATE SCH MG: 30 TABLET, FILM COATED, EXTENDED RELEASE ORAL at 22:35

## 2022-12-08 RX ADMIN — IPRATROPIUM BROMIDE AND ALBUTEROL SULFATE SCH: .5; 3 SOLUTION RESPIRATORY (INHALATION) at 16:40

## 2022-12-08 RX ADMIN — HYDROCODONE BITARTRATE AND ACETAMINOPHEN PRN EACH: 5; 325 TABLET ORAL at 12:57

## 2022-12-08 RX ADMIN — HEPARIN SODIUM SCH UNIT: 5000 INJECTION INTRAVENOUS; SUBCUTANEOUS at 09:18

## 2022-12-08 RX ADMIN — AMPICILLIN SODIUM AND SULBACTAM SODIUM SCH MLS/HR: 2; 1 INJECTION, POWDER, FOR SOLUTION INTRAMUSCULAR; INTRAVENOUS at 12:23

## 2022-12-08 RX ADMIN — HEPARIN SODIUM SCH UNIT: 5000 INJECTION INTRAVENOUS; SUBCUTANEOUS at 22:35

## 2022-12-08 RX ADMIN — FAMOTIDINE SCH MG: 20 TABLET, FILM COATED ORAL at 20:52

## 2022-12-08 RX ADMIN — FUROSEMIDE SCH MG: 40 TABLET ORAL at 09:19

## 2022-12-08 RX ADMIN — IPRATROPIUM BROMIDE AND ALBUTEROL SULFATE SCH ML: .5; 3 SOLUTION RESPIRATORY (INHALATION) at 11:41

## 2022-12-08 RX ADMIN — INSULIN ASPART SCH UNIT: 100 INJECTION, SOLUTION INTRAVENOUS; SUBCUTANEOUS at 20:52

## 2022-12-08 RX ADMIN — LIDOCAINE SCH PATCH: 50 PATCH TOPICAL at 17:03

## 2022-12-08 RX ADMIN — AMPICILLIN SODIUM AND SULBACTAM SODIUM SCH MLS/HR: 2; 1 INJECTION, POWDER, FOR SOLUTION INTRAMUSCULAR; INTRAVENOUS at 05:39

## 2022-12-08 RX ADMIN — AMPICILLIN SODIUM AND SULBACTAM SODIUM SCH MLS/HR: 2; 1 INJECTION, POWDER, FOR SOLUTION INTRAMUSCULAR; INTRAVENOUS at 22:35

## 2022-12-08 RX ADMIN — METHYLPREDNISOLONE SODIUM SUCCINATE SCH MG: 40 INJECTION, POWDER, FOR SOLUTION INTRAMUSCULAR; INTRAVENOUS at 09:18

## 2022-12-08 RX ADMIN — FUROSEMIDE SCH MG: 40 TABLET ORAL at 17:42

## 2022-12-08 RX ADMIN — METOPROLOL TARTRATE SCH MG: 25 TABLET, FILM COATED ORAL at 20:52

## 2022-12-08 RX ADMIN — PREGABALIN SCH MG: 100 CAPSULE ORAL at 09:18

## 2022-12-08 RX ADMIN — AMPICILLIN SODIUM AND SULBACTAM SODIUM SCH MLS/HR: 2; 1 INJECTION, POWDER, FOR SOLUTION INTRAMUSCULAR; INTRAVENOUS at 17:43

## 2022-12-08 RX ADMIN — DOCUSATE SODIUM SCH MG: 100 CAPSULE, LIQUID FILLED ORAL at 09:19

## 2022-12-08 RX ADMIN — FAMOTIDINE SCH MG: 20 TABLET, FILM COATED ORAL at 09:19

## 2022-12-08 RX ADMIN — HEPARIN SODIUM SCH UNIT: 5000 INJECTION INTRAVENOUS; SUBCUTANEOUS at 17:41

## 2022-12-08 RX ADMIN — PREGABALIN SCH MG: 100 CAPSULE ORAL at 20:52

## 2022-12-08 RX ADMIN — METHYLPREDNISOLONE SODIUM SUCCINATE SCH MG: 40 INJECTION, POWDER, FOR SOLUTION INTRAMUSCULAR; INTRAVENOUS at 20:52

## 2022-12-08 RX ADMIN — MORPHINE SULFATE SCH MG: 30 TABLET, FILM COATED, EXTENDED RELEASE ORAL at 09:18

## 2022-12-08 RX ADMIN — INSULIN ASPART SCH UNIT: 100 INJECTION, SOLUTION INTRAVENOUS; SUBCUTANEOUS at 12:22

## 2022-12-08 RX ADMIN — METOPROLOL TARTRATE SCH MG: 25 TABLET, FILM COATED ORAL at 09:19

## 2022-12-08 RX ADMIN — LORATADINE SCH MG: 10 TABLET ORAL at 09:19

## 2022-12-08 RX ADMIN — INSULIN ASPART SCH UNIT: 100 INJECTION, SOLUTION INTRAVENOUS; SUBCUTANEOUS at 17:42

## 2022-12-08 RX ADMIN — DOCUSATE SODIUM SCH MG: 100 CAPSULE, LIQUID FILLED ORAL at 20:52

## 2022-12-08 RX ADMIN — COLLAGENASE SANTYL SCH APPLIC: 250 OINTMENT TOPICAL at 17:03

## 2022-12-08 RX ADMIN — MORPHINE SULFATE SCH MG: 30 TABLET, FILM COATED, EXTENDED RELEASE ORAL at 17:42

## 2022-12-08 NOTE — P.PN
Subjective





History of present illness; patient is a 62-year-old gentleman with past medical

history significant for lung cancer on immunotherapy, congestive heart Failure, 

COPD, asthma who presented to the ER because of worsening shortness of breath 

and swelling of legs.  Patient has been noticing increasing shortness of breath 

on exertion for the last couple of days associated with increased swelling of 

legs.  Patient was on diuretics at home but stated that it was not working and 

he continues to be short of breath.  Patient also currently on antibiotics for 

right foot wound.  Because of this worsening shortness of breath and swelling of

legs patient came to the ER.  Initial lab work showed patient to have a white 

count of 12.9, hemoglobin of 8.8, platelet count of 628, sodium 137, potassium 

4.3, carbon dioxide 38, troponin 0.1.  CTA chest done showed no evidence of 

pulmonary embolism.  It did show pleural thickening and destructive changes in 

the left upper ribs.  Large mass involving the upper limits of chest wall was 

also seen.  Patient was admitted to hospitalist service for further evaluation 

and treatment





12/05/2022


Patient still complaining of from dyspnea he says the same but no chest pain and

no significant cough and


He has chronic left shoulder pain and swelling of the left upper extremity 

related to his cancer


Also he has evidence of right foot infection with no evidence of cellulitis, he 

has diabetic foot ulcer on the sole with black eschar


He is in 2 L oxygen via nasal cannula


Oncology team are on the case and the recommend to resume immunotherapy on 12/15

if he is stable, no recent chemotherapy





12/06/2022


Patient breathing is improving however patient is still hypoxic 88-92% on 4 L 

oxygen via nasal cannula


His with non-STEMI moderate aortic stenosis and CHF, he is on IV Lasix was 

switched to oral dose today.  Cardiologist on the case.


His WBC is 12,000, hemoglobin 8.9.


Pulmonary team were consulted today


Patient informed about his next immunotherapy appointment on 12/15 and vision 

and wife at bedside agreeable.


Patient has home oxygen of 2 L/m 


because of his cancer he is on morphine 3 times a day each make him little bit 

more sleepy which is expected.  Also patient has constipation so Colace and 

senna was added








12/07/2022


Patient states that his breathing is better today patient feels is getting ready

and closer for discharge


Patient still constipated although he taking narcotics he was started on Colace 

and senna yesterday, we will wait another 24 hours.


As per patient and family is okay for him to go to rehab.  Even for short-term 

and then follow up with Dr. Eller.


Also the planned to follow-up with wound center for his right foot.


Patient WBCs 13,000, hemoglobin 9.3, follow-up hemoglobin as an outpatient.


He is saturating 93% on 4 L oxygen, his tachycardia is better


Wound culture is growing and aerobic gram-negative bacilli and an aerobic gram-

positive cocci as well as MSSA in the anaerobic culture, currently his on 

doxycycline causes of this abdominal wound culture we will consult infectious 

disease team


Patient is improving and may be considered for discharge in 24-48 hours





12/08/2022


Patient awake alert, his breathing looks his stable and he is saturating 95% on 

4 L oxygen via nasal cannula, he is not in respiratory distress on the contrary 

is breathing quietly.  Pulmonary team on the case.  As well as cardiologist, for

his CHF with some elements of COPD exacerbation.  He is now on oral Lasix.  He 

was being considered for discharge regarding his cardiopulmonary status however 

patient right foot ulcer was growing multiple bacteria including MSSA and an 

aerobic bacteria, and antibiotics was adjusted into Unasyn by ID team, who 

consulted vascular surgery for debridement and still pending.


Also patient has chronic left shoulder pain and limitation of movement and now 

is undergoing debridement of his right foot, wife at bedside states that is hard

for her to take care of him at home because of this and she preferred him to go 

to ECF for [facility], patient is agreeable with that and I discussed the case with our

 Kathryn and she currently took note of this and she is going to work 

on it.


Patient, his wife and even his daughter yesterday are aware that his 

chemotherapy/immunotherapy would be held while he is in rehab and they plan for 

short-term rehab stay can be discharged and follow-up with his oncologist Dr. Eller for further treatment for his cancer.  Hopefully this will happen within 

one week.  Anyway patient was encouraged to follow-up with his oncologist in one

week to 10 days.








Objective





- Vital Signs


Vital signs: 


                                   Vital Signs











Temp  98.2 F   12/08/22 08:00


 


Pulse  100   12/08/22 12:00


 


Resp  16   12/08/22 12:00


 


BP  134/81   12/08/22 12:00


 


Pulse Ox  95   12/08/22 12:00


 


FiO2      








                                 Intake & Output











 12/07/22 12/08/22 12/08/22





 18:59 06:59 18:59


 


Intake Total 540  180


 


Output Total 225 750 


 


Balance 315 -750 180


 


Weight  99.4 kg 99.4 kg


 


Intake:   


 


  Oral 540  180


 


Output:   


 


  Urine 225 750 


 


Other:   


 


  Voiding Method Diaper Urinal Urinal





  Diaper Diaper














- Exam





GENERAL: The patient is alert and oriented x3, not in any acute distress. Well 

developed, well nourished. 


HEENT: Pupils are round and equally reacting to light. EOMI. No scleral icterus.

No conjunctival pallor. Normocephalic, atraumatic. No pharyngeal erythema. No 

thyromegaly. 


CARDIOVASCULAR: S1 and S2 present. No murmurs, rubs, or gallops. 


-PULMONARY: Chest is clear to auscultation, no wheezing .  Bilateral basal or 

crackles. 


ABDOMEN: Soft, nontender, nondistended, normoactive bowel sounds. No palpable 

organomegaly. 


MUSCULOSKELETAL: No joint swelling or deformity. 


-EXTREMITIES: No cyanosis, clubbing, or pedal mild B/L Castillo edema.  Left upper 

extremities as well as with limitation of movement [chronic] right foot ulcer on

 the sole with black eschar on the top, no surrounding cellulitis


NEUROLOGICAL: Gross neurological examination did not reveal any focal deficits. 


SKIN: No rashes. no petechiae.





- Labs


CBC & Chem 7: 


                                 12/07/22 07:30





                                 12/07/22 07:30


Labs: 


                  Abnormal Lab Results - Last 24 Hours (Table)











  12/07/22 12/07/22 12/08/22 Range/Units





  16:43 20:36 05:57 


 


POC Glucose (mg/dL)  260 H  246 H  230 H  ()  mg/dL














  12/08/22 Range/Units





  11:44 


 


POC Glucose (mg/dL)  196 H  ()  mg/dL








                      Microbiology - Last 24 Hours (Table)











 12/03/22 21:16 Anaerobic Culture - Final





 Foot - Right    Anaerobic Gm Negative Bacilli





    Anaerobic Gram Positive Cocci














Assessment and Plan


Assessment: 





Acute onset congestive heart failure.


None STEMI with elevated troponin


Moderate aortic stenosis


Lung cancer with multiple nodular pulmonary metastasis throughout both lung 

fields


Chronic left shoulder pain and swelling of the left upper extremity related to 

his history of cancer


Right foot infection with staph


Acute on chronic hypoxic respiratory failure


Right foot wound


Lung cancer


Diabetes mellitus


Hypertension





Plan: 





Continue with, switched to oral dose on 12/7 six


Strict I's and O's


Daily weights.


Monitor renal functions


Change antibiotics into doxycycline and follow-up wound culture


Consult cardiology.


Pulmonary team consult


Consult hematology oncology.


Infectious disease consult


Consult wound care


Labs and medication were reviewed..  Continue same treatment.  Continue with 

symptomatic treatment.  Resume home medication.  Monitor lytes and vitals.  DVT 

and GI prophylaxis.  Further recommendations as per clinical course of the 

patient


DVT prophylaxis: Subcutaneous heparin


GI Prophylaxis: Pepcid


Prognosis is guarded 


plan discussed with patient and family including wife and daughter at bedside 

and all their questions answered and they're agreeable with the current plan

## 2022-12-08 NOTE — P.GSCN
History of Present Illness


History of present illness: 


62-year-old diabetic male patient was consulted patient has a chronic wound 

right foot plantar aspect infected with some drainage patient scheduled to have 

a debridement and deep culture.  Patient has history of shortness of breath 

history of diabetes patient has a MRSA with on IV antibiotic under care of 

infectious disease





Chest patient has a crackles bilateral pulses second sound present





Abdomen soft nontender





Vascular femorals are 2+ bilateral dorsal pedis is 1+ patient has a 2 x 2 CM 

infected wound on the plantar aspect with some debridement last tissue and some 

drainage





Plan is debridement of the wound and deep culture








Past Medical History


Past Medical History: Asthma, Cancer, Heart Failure, COPD, Diabetes Mellitus, 

Hypertension


Additional Past Medical History / Comment(s): Uses home O2 as needed only. Sq

uamous cell carcinoma of the lung, rib lesion and lymph adenopathy, shoulder 

pain related to lung mass. Pancous tumor left apex


History of Any Multi-Drug Resistant Organisms: MRSA


Year Discovered:: 6/23/16


MDRO Source:: Left Foot


Past Surgical History: Hernia Repair, Orthopedic Surgery, Tonsillectomy


Additional Past Surgical History / Comment(s): lt. 5th toe amp


Past Anesthesia/Blood Transfusion Reactions: No Reported Reaction


Past Psychological History: Anxiety


Smoking Status: Former smoker


Past Alcohol Use History: None Reported


Past Drug Use History: None Reported


Additional Drug Use History / Comment(s): quit drinking 10 years ago in 2012, 

quit smoking Sept 2021





- Past Family History


  ** Sister(s)


Family Medical History: Cancer





  ** Father


Family Medical History: Congestive Heart Failure (CHF), Diabetes Mellitus





  ** Mother


Family Medical History: Renal Disease





Medications and Allergies


                                Home Medications











 Medication  Instructions  Recorded  Confirmed  Type


 


Albuterol Sulfate [Ventolin HFA] 2 puff INHALATION RT-Q4H PRN 09/03/21 12/03/22 

History


 


Morphine Sulfate [Ms Contin] 30 mg PO TID 04/18/22 12/03/22 History


 


ondansetron HCL [Zofran] 8 mg PO TID PRN 09/17/22 12/03/22 History


 


Loratadine [Claritin] 10 mg PO DAILY #30 tab 09/18/22 12/03/22 Rx


 


Furosemide [Lasix] 20 mg PO DAILY PRN 10/12/22 12/03/22 History


 


Pregabalin [Lyrica] 200 mg PO BID 10/12/22 12/03/22 History


 


Cephalexin [Keflex] 500 mg PO QID 12/03/22 12/03/22 History


 


Ferrous Sulfate [Feosol] 325 mg PO DAILY 12/03/22 12/03/22 History


 


Ipratropium-Albuterol Nebulize 3 ml INHALATION RT-QID PRN 12/03/22 12/03/22 

History





[Duoneb 0.5 mg-3 mg/3 ml Soln]    


 


Multivitamins, Thera [Multivitamin 1 tab PO DAILY 12/03/22 12/03/22 History





(formulary)]    








                                    Allergies











Allergy/AdvReac Type Severity Reaction Status Date / Time


 


loratadine [From Claritin-D] AdvReac  Confusion Verified 12/03/22 21:44


 


pseudoephedrine AdvReac  Confusion Verified 12/03/22 21:44





[From Claritin-D]     














Surgical - Exam


                                   Vital Signs











Temp Pulse Resp BP Pulse Ox


 


 98.1 F   101 H  22   157/87   83 L


 


 12/03/22 19:46  12/03/22 19:46  12/03/22 19:46  12/03/22 19:46  12/03/22 19:46














Results





- Labs





                                 12/07/22 07:30





                                 12/07/22 07:30


                  Abnormal Lab Results - Last 24 Hours (Table)











  12/07/22 12/08/22 12/08/22 Range/Units





  20:36 05:57 11:44 


 


POC Glucose (mg/dL)  246 H  230 H  196 H  ()  mg/dL








                      Microbiology - Last 24 Hours (Table)











 12/03/22 21:16 Anaerobic Culture - Final





 Foot - Right    Anaerobic Gm Negative Bacilli





    Anaerobic Gram Positive Cocci

## 2022-12-08 NOTE — P.PCN
Description of Procedure: 


Preoperative diagnoses is infected wound right foot plantar aspect measurement 

is 3 x 2 cm





Postop same measurement is 3 x 2 x 1 cm





Procedure debridement of the wound right foot right foot was prepped and draped 

applied sterile manner 1% lidocaine was applied to the wound using sharp knife 

we debrided to the wound down to subcu tissue fact and all the debris cast 

tissue was excised the wound will follow in order we took the deep culture sent 

for culture until cream applied to the wound pressure dressing applied patient 

are to the procedure well plan is we'll change her dressing daily with Santyl 

cream

## 2022-12-09 LAB
ANION GAP SERPL CALC-SCNC: 7 MMOL/L
BASOPHILS # BLD AUTO: 0 K/UL (ref 0–0.2)
BASOPHILS NFR BLD AUTO: 0 %
BUN SERPL-SCNC: 22 MG/DL (ref 9–20)
CALCIUM SPEC-MCNC: 8.2 MG/DL (ref 8.4–10.2)
CHLORIDE SERPL-SCNC: 89 MMOL/L (ref 98–107)
CO2 SERPL-SCNC: 41 MMOL/L (ref 22–30)
EOSINOPHIL # BLD AUTO: 0 K/UL (ref 0–0.7)
EOSINOPHIL NFR BLD AUTO: 0 %
ERYTHROCYTE [DISTWIDTH] IN BLOOD BY AUTOMATED COUNT: 4.01 M/UL (ref 4.3–5.9)
ERYTHROCYTE [DISTWIDTH] IN BLOOD: 16.1 % (ref 11.5–15.5)
GLUCOSE BLD-MCNC: 185 MG/DL (ref 70–110)
GLUCOSE BLD-MCNC: 192 MG/DL (ref 70–110)
GLUCOSE BLD-MCNC: 203 MG/DL (ref 70–110)
GLUCOSE BLD-MCNC: 221 MG/DL (ref 70–110)
GLUCOSE SERPL-MCNC: 135 MG/DL (ref 74–99)
HCT VFR BLD AUTO: 34.1 % (ref 39–53)
HGB BLD-MCNC: 9.4 GM/DL (ref 13–17.5)
LYMPHOCYTES # SPEC AUTO: 1.1 K/UL (ref 1–4.8)
LYMPHOCYTES NFR SPEC AUTO: 7 %
MCH RBC QN AUTO: 23.4 PG (ref 25–35)
MCHC RBC AUTO-ENTMCNC: 27.5 G/DL (ref 31–37)
MCV RBC AUTO: 85 FL (ref 80–100)
MONOCYTES # BLD AUTO: 0.7 K/UL (ref 0–1)
MONOCYTES NFR BLD AUTO: 4 %
NEUTROPHILS # BLD AUTO: 14.5 K/UL (ref 1.3–7.7)
NEUTROPHILS NFR BLD AUTO: 87 %
PLATELET # BLD AUTO: 590 K/UL (ref 150–450)
POTASSIUM SERPL-SCNC: 4.1 MMOL/L (ref 3.5–5.1)
SODIUM SERPL-SCNC: 137 MMOL/L (ref 137–145)
WBC # BLD AUTO: 16.6 K/UL (ref 3.8–10.6)

## 2022-12-09 RX ADMIN — HEPARIN SODIUM SCH UNIT: 5000 INJECTION INTRAVENOUS; SUBCUTANEOUS at 22:50

## 2022-12-09 RX ADMIN — METOPROLOL TARTRATE SCH MG: 25 TABLET, FILM COATED ORAL at 20:09

## 2022-12-09 RX ADMIN — HEPARIN SODIUM SCH UNIT: 5000 INJECTION INTRAVENOUS; SUBCUTANEOUS at 09:21

## 2022-12-09 RX ADMIN — MORPHINE SULFATE SCH MG: 30 TABLET, FILM COATED, EXTENDED RELEASE ORAL at 16:57

## 2022-12-09 RX ADMIN — AMPICILLIN SODIUM AND SULBACTAM SODIUM SCH MLS/HR: 2; 1 INJECTION, POWDER, FOR SOLUTION INTRAMUSCULAR; INTRAVENOUS at 06:38

## 2022-12-09 RX ADMIN — FAMOTIDINE SCH MG: 20 TABLET, FILM COATED ORAL at 20:09

## 2022-12-09 RX ADMIN — HEPARIN SODIUM SCH UNIT: 5000 INJECTION INTRAVENOUS; SUBCUTANEOUS at 17:01

## 2022-12-09 RX ADMIN — INSULIN ASPART SCH UNIT: 100 INJECTION, SOLUTION INTRAVENOUS; SUBCUTANEOUS at 06:38

## 2022-12-09 RX ADMIN — PREGABALIN SCH MG: 100 CAPSULE ORAL at 20:09

## 2022-12-09 RX ADMIN — DOCUSATE SODIUM SCH MG: 100 CAPSULE, LIQUID FILLED ORAL at 09:21

## 2022-12-09 RX ADMIN — PREGABALIN SCH MG: 100 CAPSULE ORAL at 09:21

## 2022-12-09 RX ADMIN — Medication SCH MG: at 13:16

## 2022-12-09 RX ADMIN — DOCUSATE SODIUM SCH MG: 100 CAPSULE, LIQUID FILLED ORAL at 20:09

## 2022-12-09 RX ADMIN — INSULIN ASPART SCH UNIT: 100 INJECTION, SOLUTION INTRAVENOUS; SUBCUTANEOUS at 13:15

## 2022-12-09 RX ADMIN — LORATADINE SCH MG: 10 TABLET ORAL at 09:21

## 2022-12-09 RX ADMIN — FUROSEMIDE SCH MG: 40 TABLET ORAL at 16:57

## 2022-12-09 RX ADMIN — METHYLPREDNISOLONE SODIUM SUCCINATE SCH MG: 40 INJECTION, POWDER, FOR SOLUTION INTRAMUSCULAR; INTRAVENOUS at 09:20

## 2022-12-09 RX ADMIN — METOPROLOL TARTRATE SCH MG: 25 TABLET, FILM COATED ORAL at 09:21

## 2022-12-09 RX ADMIN — INSULIN ASPART SCH UNIT: 100 INJECTION, SOLUTION INTRAVENOUS; SUBCUTANEOUS at 20:09

## 2022-12-09 RX ADMIN — MORPHINE SULFATE SCH MG: 30 TABLET, FILM COATED, EXTENDED RELEASE ORAL at 22:50

## 2022-12-09 RX ADMIN — LIDOCAINE SCH PATCH: 50 PATCH TOPICAL at 16:57

## 2022-12-09 RX ADMIN — MORPHINE SULFATE SCH MG: 30 TABLET, FILM COATED, EXTENDED RELEASE ORAL at 09:21

## 2022-12-09 RX ADMIN — FUROSEMIDE SCH MG: 40 TABLET ORAL at 09:21

## 2022-12-09 RX ADMIN — IPRATROPIUM BROMIDE AND ALBUTEROL SULFATE SCH ML: .5; 3 SOLUTION RESPIRATORY (INHALATION) at 15:20

## 2022-12-09 RX ADMIN — COLLAGENASE SANTYL SCH APPLIC: 250 OINTMENT TOPICAL at 17:38

## 2022-12-09 RX ADMIN — AMPICILLIN SODIUM AND SULBACTAM SODIUM SCH MLS/HR: 2; 1 INJECTION, POWDER, FOR SOLUTION INTRAMUSCULAR; INTRAVENOUS at 22:50

## 2022-12-09 RX ADMIN — FAMOTIDINE SCH MG: 20 TABLET, FILM COATED ORAL at 09:21

## 2022-12-09 RX ADMIN — AMPICILLIN SODIUM AND SULBACTAM SODIUM SCH MLS/HR: 2; 1 INJECTION, POWDER, FOR SOLUTION INTRAMUSCULAR; INTRAVENOUS at 13:15

## 2022-12-09 RX ADMIN — METHYLPREDNISOLONE SODIUM SUCCINATE SCH MG: 40 INJECTION, POWDER, FOR SOLUTION INTRAMUSCULAR; INTRAVENOUS at 20:09

## 2022-12-09 RX ADMIN — IPRATROPIUM BROMIDE AND ALBUTEROL SULFATE SCH ML: .5; 3 SOLUTION RESPIRATORY (INHALATION) at 19:59

## 2022-12-09 RX ADMIN — INSULIN ASPART SCH UNIT: 100 INJECTION, SOLUTION INTRAVENOUS; SUBCUTANEOUS at 17:02

## 2022-12-09 RX ADMIN — AMPICILLIN SODIUM AND SULBACTAM SODIUM SCH MLS/HR: 2; 1 INJECTION, POWDER, FOR SOLUTION INTRAMUSCULAR; INTRAVENOUS at 16:57

## 2022-12-09 RX ADMIN — IPRATROPIUM BROMIDE AND ALBUTEROL SULFATE SCH ML: .5; 3 SOLUTION RESPIRATORY (INHALATION) at 11:30

## 2022-12-09 RX ADMIN — IPRATROPIUM BROMIDE AND ALBUTEROL SULFATE SCH ML: .5; 3 SOLUTION RESPIRATORY (INHALATION) at 07:35

## 2022-12-09 NOTE — P.PN
Progress Note - Text


Patient was seen today patient had a debridement of the right heel today we have

changed the dressing at this point no drainage noted we have changed the 

dressing with Santyl cream which will be continued which is an IV antibiotic 

under care of infectious disease changed the dressing daily with Santyl cream

## 2022-12-09 NOTE — P.PN
Subjective


Progress Note Date: 12/09/22





62-year-old gentleman with past medical history significant for lung cancer on 

immunotherapy, congestive heart Failure, COPD, asthma who presented to the ER 

because of worsening shortness of breath and swelling of legs.  Patient has been

noticing increasing shortness of breath on exertion for the last couple of days 

associated with increased swelling of legs.  Patient was on diuretics at home 

but stated that it was not working and he continues to be short of breath.  

Patient also currently on antibiotics for right foot wound.  Because of this 

worsening shortness of breath and swelling of legs patient came to the ER.  

Initial lab work showed patient to have a white count of 12.9, hemoglobin of 

8.8, platelet count of 628, sodium 137, potassium 4.3, carbon dioxide 38, 

troponin 0.1.  CTA chest done showed no evidence of pulmonary embolism.  It did 

show pleural thickening and destructive changes in the left upper ribs.  Large 

mass involving the upper limits of chest wall was also seen.  Patient was 

admitted to hospitalist service for further evaluation and treatment





Objective





- Vital Signs


Vital signs: 


                                   Vital Signs











Temp  97.9 F   12/09/22 08:00


 


Pulse  86   12/09/22 11:46


 


Resp  18   12/09/22 08:00


 


BP  122/64   12/09/22 08:00


 


Pulse Ox  95   12/09/22 08:00


 


FiO2      








                                 Intake & Output











 12/08/22 12/09/22 12/09/22





 18:59 06:59 18:59


 


Intake Total 360 420 118


 


Output Total 425 350 600


 


Balance -65 70 -482


 


Weight 99.4 kg 105 kg 


 


Intake:   


 


  Oral 360 420 118


 


Output:   


 


  Urine 425 350 600


 


Other:   


 


  Voiding Method Urinal Urinal Urinal





 Diaper Diaper Diaper


 


  # Voids  2 














- Exam





GENERAL: The patient is alert and oriented x3, not in any acute distress. Well 

developed, well nourished. 


HEENT: Pupils are round and equally reacting to light. EOMI. No scleral icterus.

No conjunctival pallor. Normocephalic, atraumatic. No pharyngeal erythema. No 

thyromegaly. 


CARDIOVASCULAR: S1 and S2 present. No murmurs, rubs, or gallops. 


-PULMONARY: Chest is clear to auscultation, no wheezing .  Bilateral basal or 

crackles. 


ABDOMEN: Soft, nontender, nondistended, normoactive bowel sounds. No palpable 

organomegaly. 


MUSCULOSKELETAL: No joint swelling or deformity. 


-EXTREMITIES: No cyanosis, clubbing, or pedal mild B/L Castillo edema.  Left upper 

extremities as well as with limitation of movement [chronic] right foot ulcer on

 the sole with black eschar on the top, no surrounding cellulitis


NEUROLOGICAL: Gross neurological examination did not reveal any focal deficits. 


SKIN: No rashes. no petechiae.





- Labs


CBC & Chem 7: 


                                 12/09/22 09:06





                                 12/09/22 09:06


Labs: 


                  Abnormal Lab Results - Last 24 Hours (Table)











  12/08/22 12/08/22 12/09/22 Range/Units





  17:05 20:14 05:49 


 


WBC     (3.8-10.6)  k/uL


 


RBC     (4.30-5.90)  m/uL


 


Hgb     (13.0-17.5)  gm/dL


 


Hct     (39.0-53.0)  %


 


MCH     (25.0-35.0)  pg


 


MCHC     (31.0-37.0)  g/dL


 


RDW     (11.5-15.5)  %


 


Plt Count     (150-450)  k/uL


 


Neutrophils #     (1.3-7.7)  k/uL


 


Chloride     ()  mmol/L


 


Carbon Dioxide     (22-30)  mmol/L


 


BUN     (9-20)  mg/dL


 


Creatinine     (0.66-1.25)  mg/dL


 


Glucose     (74-99)  mg/dL


 


POC Glucose (mg/dL)  229 H  203 H  203 H  ()  mg/dL


 


Calcium     (8.4-10.2)  mg/dL














  12/09/22 12/09/22 12/09/22 Range/Units





  09:06 09:06 11:53 


 


WBC  16.6 H    (3.8-10.6)  k/uL


 


RBC  4.01 L    (4.30-5.90)  m/uL


 


Hgb  9.4 L    (13.0-17.5)  gm/dL


 


Hct  34.1 L    (39.0-53.0)  %


 


MCH  23.4 L    (25.0-35.0)  pg


 


MCHC  27.5 L    (31.0-37.0)  g/dL


 


RDW  16.1 H    (11.5-15.5)  %


 


Plt Count  590 H    (150-450)  k/uL


 


Neutrophils #  14.5 H    (1.3-7.7)  k/uL


 


Chloride   89 L   ()  mmol/L


 


Carbon Dioxide   41 H*   (22-30)  mmol/L


 


BUN   22 H   (9-20)  mg/dL


 


Creatinine   0.60 L   (0.66-1.25)  mg/dL


 


Glucose   135 H   (74-99)  mg/dL


 


POC Glucose (mg/dL)    185 H  ()  mg/dL


 


Calcium   8.2 L   (8.4-10.2)  mg/dL








                      Microbiology - Last 24 Hours (Table)











 12/08/22 17:57 Tissue Culture - Preliminary





 Foot - Right 














Assessment and Plan


Assessment: 





Acute onset congestive heart failure.


None STEMI with elevated troponin


Moderate aortic stenosis


Lung cancer with multiple nodular pulmonary metastasis throughout both lung 

fields


Chronic left shoulder pain and swelling of the left upper extremity related to 

his history of cancer


Right foot infection with staph


Acute on chronic hypoxic respiratory failure


Right foot wound


Lung cancer


Diabetes mellitus


Hypertension





Plan: 





Continue with, switched to oral dose on 12/7 six


Strict I's and O's


Daily weights.


Monitor renal functions


Change antibiotics into doxycycline and follow-up wound culture


Consult cardiology.


Pulmonary team consult


Consult hematology oncology.


Infectious disease consult


Consult wound care


Labs and medication were reviewed..  Continue same treatment.  Continue with 

symptomatic treatment.  Resume home medication.  Monitor lytes and vitals.  DVT 

and GI prophylaxis.  Further recommendations as per clinical course of the 

patient


DVT prophylaxis: Subcutaneous heparin


GI Prophylaxis: Pepcid


Prognosis is guarded 


plan discussed with patient and family including wife and daughter at bedside 

and all their questions answered and they're agreeable with the current plan

## 2022-12-10 LAB
ANION GAP SERPL CALC-SCNC: 7 MMOL/L
BASOPHILS # BLD AUTO: 0.1 K/UL (ref 0–0.2)
BASOPHILS NFR BLD AUTO: 0 %
BUN SERPL-SCNC: 19 MG/DL (ref 9–20)
CALCIUM SPEC-MCNC: 8.3 MG/DL (ref 8.4–10.2)
CHLORIDE SERPL-SCNC: 89 MMOL/L (ref 98–107)
CO2 BLDA-SCNC: 48 MMOL/L (ref 19–24)
CO2 SERPL-SCNC: 40 MMOL/L (ref 22–30)
EOSINOPHIL # BLD AUTO: 0 K/UL (ref 0–0.7)
EOSINOPHIL NFR BLD AUTO: 0 %
ERYTHROCYTE [DISTWIDTH] IN BLOOD BY AUTOMATED COUNT: 4.06 M/UL (ref 4.3–5.9)
ERYTHROCYTE [DISTWIDTH] IN BLOOD: 16.3 % (ref 11.5–15.5)
GLUCOSE BLD-MCNC: 141 MG/DL (ref 70–110)
GLUCOSE BLD-MCNC: 174 MG/DL (ref 70–110)
GLUCOSE BLD-MCNC: 211 MG/DL (ref 70–110)
GLUCOSE BLD-MCNC: 215 MG/DL (ref 70–110)
GLUCOSE SERPL-MCNC: 122 MG/DL (ref 74–99)
HCO3 BLDA-SCNC: 46 MMOL/L (ref 21–25)
HCT VFR BLD AUTO: 34.4 % (ref 39–53)
HGB BLD-MCNC: 9.6 GM/DL (ref 13–17.5)
LYMPHOCYTES # SPEC AUTO: 1.2 K/UL (ref 1–4.8)
LYMPHOCYTES NFR SPEC AUTO: 9 %
MCH RBC QN AUTO: 23.7 PG (ref 25–35)
MCHC RBC AUTO-ENTMCNC: 27.9 G/DL (ref 31–37)
MCV RBC AUTO: 84.9 FL (ref 80–100)
MONOCYTES # BLD AUTO: 0.6 K/UL (ref 0–1)
MONOCYTES NFR BLD AUTO: 4 %
NEUTROPHILS # BLD AUTO: 11.8 K/UL (ref 1.3–7.7)
NEUTROPHILS NFR BLD AUTO: 85 %
PCO2 BLDA: 62 MMHG (ref 35–45)
PH BLDA: 7.48 [PH] (ref 7.35–7.45)
PLATELET # BLD AUTO: 499 K/UL (ref 150–450)
PO2 BLDA: 59 MMHG (ref 83–108)
POTASSIUM SERPL-SCNC: 4.1 MMOL/L (ref 3.5–5.1)
SODIUM SERPL-SCNC: 136 MMOL/L (ref 137–145)
WBC # BLD AUTO: 13.9 K/UL (ref 3.8–10.6)

## 2022-12-10 RX ADMIN — LIDOCAINE SCH PATCH: 50 PATCH TOPICAL at 15:31

## 2022-12-10 RX ADMIN — DOCUSATE SODIUM SCH MG: 100 CAPSULE, LIQUID FILLED ORAL at 20:08

## 2022-12-10 RX ADMIN — MORPHINE SULFATE SCH MG: 30 TABLET, FILM COATED, EXTENDED RELEASE ORAL at 23:13

## 2022-12-10 RX ADMIN — HEPARIN SODIUM SCH UNIT: 5000 INJECTION INTRAVENOUS; SUBCUTANEOUS at 23:11

## 2022-12-10 RX ADMIN — FAMOTIDINE SCH MG: 20 TABLET, FILM COATED ORAL at 08:35

## 2022-12-10 RX ADMIN — LORATADINE SCH MG: 10 TABLET ORAL at 08:37

## 2022-12-10 RX ADMIN — HEPARIN SODIUM SCH UNIT: 5000 INJECTION INTRAVENOUS; SUBCUTANEOUS at 08:35

## 2022-12-10 RX ADMIN — FAMOTIDINE SCH MG: 20 TABLET, FILM COATED ORAL at 20:08

## 2022-12-10 RX ADMIN — IPRATROPIUM BROMIDE AND ALBUTEROL SULFATE SCH ML: .5; 3 SOLUTION RESPIRATORY (INHALATION) at 20:00

## 2022-12-10 RX ADMIN — METHYLPREDNISOLONE SODIUM SUCCINATE SCH MG: 40 INJECTION, POWDER, FOR SOLUTION INTRAMUSCULAR; INTRAVENOUS at 20:09

## 2022-12-10 RX ADMIN — AMPICILLIN SODIUM AND SULBACTAM SODIUM SCH MLS/HR: 2; 1 INJECTION, POWDER, FOR SOLUTION INTRAMUSCULAR; INTRAVENOUS at 23:11

## 2022-12-10 RX ADMIN — INSULIN ASPART SCH UNIT: 100 INJECTION, SOLUTION INTRAVENOUS; SUBCUTANEOUS at 11:59

## 2022-12-10 RX ADMIN — INSULIN ASPART SCH UNIT: 100 INJECTION, SOLUTION INTRAVENOUS; SUBCUTANEOUS at 05:59

## 2022-12-10 RX ADMIN — IPRATROPIUM BROMIDE AND ALBUTEROL SULFATE SCH ML: .5; 3 SOLUTION RESPIRATORY (INHALATION) at 09:03

## 2022-12-10 RX ADMIN — DOCUSATE SODIUM SCH MG: 100 CAPSULE, LIQUID FILLED ORAL at 08:36

## 2022-12-10 RX ADMIN — METOPROLOL TARTRATE SCH MG: 25 TABLET, FILM COATED ORAL at 08:36

## 2022-12-10 RX ADMIN — IPRATROPIUM BROMIDE AND ALBUTEROL SULFATE SCH ML: .5; 3 SOLUTION RESPIRATORY (INHALATION) at 12:19

## 2022-12-10 RX ADMIN — FUROSEMIDE SCH MG: 40 TABLET ORAL at 08:36

## 2022-12-10 RX ADMIN — MORPHINE SULFATE SCH MG: 30 TABLET, FILM COATED, EXTENDED RELEASE ORAL at 15:31

## 2022-12-10 RX ADMIN — FUROSEMIDE SCH MG: 40 TABLET ORAL at 15:32

## 2022-12-10 RX ADMIN — HEPARIN SODIUM SCH UNIT: 5000 INJECTION INTRAVENOUS; SUBCUTANEOUS at 15:32

## 2022-12-10 RX ADMIN — METOPROLOL TARTRATE SCH MG: 25 TABLET, FILM COATED ORAL at 20:08

## 2022-12-10 RX ADMIN — MORPHINE SULFATE SCH MG: 30 TABLET, FILM COATED, EXTENDED RELEASE ORAL at 08:36

## 2022-12-10 RX ADMIN — PREGABALIN SCH MG: 100 CAPSULE ORAL at 08:37

## 2022-12-10 RX ADMIN — INSULIN ASPART SCH UNIT: 100 INJECTION, SOLUTION INTRAVENOUS; SUBCUTANEOUS at 17:07

## 2022-12-10 RX ADMIN — AMPICILLIN SODIUM AND SULBACTAM SODIUM SCH MLS/HR: 2; 1 INJECTION, POWDER, FOR SOLUTION INTRAMUSCULAR; INTRAVENOUS at 05:59

## 2022-12-10 RX ADMIN — AMPICILLIN SODIUM AND SULBACTAM SODIUM SCH MLS/HR: 2; 1 INJECTION, POWDER, FOR SOLUTION INTRAMUSCULAR; INTRAVENOUS at 11:01

## 2022-12-10 RX ADMIN — IPRATROPIUM BROMIDE AND ALBUTEROL SULFATE SCH ML: .5; 3 SOLUTION RESPIRATORY (INHALATION) at 16:29

## 2022-12-10 RX ADMIN — PREGABALIN SCH MG: 100 CAPSULE ORAL at 20:09

## 2022-12-10 RX ADMIN — INSULIN ASPART SCH: 100 INJECTION, SOLUTION INTRAVENOUS; SUBCUTANEOUS at 19:58

## 2022-12-10 RX ADMIN — Medication SCH MG: at 08:37

## 2022-12-10 RX ADMIN — AMPICILLIN SODIUM AND SULBACTAM SODIUM SCH MLS/HR: 2; 1 INJECTION, POWDER, FOR SOLUTION INTRAMUSCULAR; INTRAVENOUS at 17:07

## 2022-12-10 RX ADMIN — COLLAGENASE SANTYL SCH APPLIC: 250 OINTMENT TOPICAL at 09:28

## 2022-12-10 RX ADMIN — METHYLPREDNISOLONE SODIUM SUCCINATE SCH MG: 40 INJECTION, POWDER, FOR SOLUTION INTRAMUSCULAR; INTRAVENOUS at 08:35

## 2022-12-10 NOTE — P.PN
Subjective


Progress Note Date: 12/08/22


Principal diagnosis: 





Acute on chronic hypoxic respiratory failure appeared to be related to 

combination diastolic heart failure and COPD exacerbation





Acute COPD exacerbation





Lung mass with invasion to chest wall distraction involving the ribs





Metastatic stage IV lung cancer





Anasarca and fluid overload





Bilateral small pleural effusion more so on the right side compared left side


12/8/2022, patient seen eval reexamined awake and alert, denies any chest pain 

ongoing shortness of breath is present, patient remains on 4 L oxygen, patient 

has been on Lasix for acute exacerbation of congestive heart failure also on 

bronchodilators and broad-spectrum antibiotics, with IV steroids tolerating well





12/07/2022, patient seen and evaluated examined during the rounds labs reviewed 

medications reviewed, shortness of breath slightly improved however remains on 4

L oxygen ongoing dry cough is present, left upper extremity is weak swelling in 

second due to lymphedema, and labs from today's reviewed white cell count is 

13,400, hemoglobin hematocrit 9.3/33, platelet count 6 and 17,000, 

BUN/creatinine 17/0.53 sodium is 135 potassium 4.5 CO2 is 39 which is 

progressively declining now





Patient is a 62-year-old male with prior medical history of lung cancer has been

on immunotherapy with Dr. Schaefer also has a history of chronic hypoxic respiratory

failure on home oxygen 2 L history of smoking and nicotine use hypertension 

hypertensive cardiovascular disease.  Patient presented into the hospital with 

increasing shortness of breath of several day duration as well as increasing 

lower extremity swelling currently patient has been on IV Lasix switched to by 

mouth, and responding well in addition patient has been on bronchodilator 

doxycycline and his home medications, echocardiogram revealed normal LV function

with moderate aortic stenosis with a peak gradient is 37 and a mean gradient 22 

and mild mitral stenosis patient has been in negative balance.  Chest x-ray on 

admission revealed bilateral pulmonary infiltrate with pleural thickening 

increase more so on the right side compared to left side, venous Doppler study 

of the negative for DVT, computed tomography scan of the chest revealed no 

evidence of pulmonary embolism, pleural thickening and destructive changes noted

in the left upper lateral leads along with large mass in the upper lateral chest

wall, and multiple nodular nodular masses bilaterally in lung field with right-

sided pleural effusion, effusion has increased compared to prior exam in 

November 2022








Objective





- Vital Signs


Vital signs: 


                                   Vital Signs











Temp  98.2 F   12/08/22 08:00


 


Pulse  100   12/08/22 12:00


 


Resp  16   12/08/22 12:00


 


BP  134/81   12/08/22 12:00


 


Pulse Ox  95   12/08/22 12:00


 


FiO2      








                                 Intake & Output











 12/07/22 12/08/22 12/08/22





 18:59 06:59 18:59


 


Intake Total 540  180


 


Output Total 225 750 


 


Balance 315 -750 180


 


Weight  99.4 kg 99.4 kg


 


Intake:   


 


  Oral 540  180


 


Output:   


 


  Urine 225 750 


 


Other:   


 


  Voiding Method Diaper Urinal Urinal





  Diaper Diaper














- Exam


- Constitutional


General appearance: average body habitus, cooperative, disheveled, mild distress





- EENT


Eyes: EOMI, PERRLA


ENT: normal oropharynx


Ears: bilateral: normal





- Neck


Carotids: bilateral: upstroke normal





- Respiratory


Respiratory: bilateral: diminished, dullness (Bilaterally in the bases)





- Cardiovascular


Rhythm: regular


Heart sounds: normal: S1, S2





- Gastrointestinal


General gastrointestinal: hyperactive bowel sounds





- Integumentary





Bilateral pedal edema with chronic skin changes


Integumentary: decreased turgor





- Neurologic


Neurologic: CNII-XII intact





- Musculoskeletal


Musculoskeletal: gait normal, generalized weakness, strength equal bilaterally





- Psychiatric


Psychiatric: A&O x's 3, appropriate affect, intact judgment & insight











- Labs


CBC & Chem 7: 


                                 12/10/22 09:13





                                 12/10/22 09:13


Labs: 


                  Abnormal Lab Results - Last 24 Hours (Table)











  12/07/22 12/07/22 12/08/22 Range/Units





  16:43 20:36 05:57 


 


POC Glucose (mg/dL)  260 H  246 H  230 H  ()  mg/dL














  12/08/22 Range/Units





  11:44 


 


POC Glucose (mg/dL)  196 H  ()  mg/dL








                      Microbiology - Last 24 Hours (Table)











 12/03/22 21:16 Anaerobic Culture - Final





 Foot - Right    Anaerobic Gm Negative Bacilli





    Anaerobic Gram Positive Cocci














Assessment and Plan


Assessment: 





Acute on chronic hypoxic respiratory failure appeared to be related to comb

ination diastolic heart failure and COPD exacerbation





Acute COPD exacerbation





Lung mass with invasion to chest wall distraction involving the ribs





Metastatic stage IV lung cancer





Anasarca and fluid overload





Bilateral small pleural effusion more so on the right side compared left side





Chronic left upper extremity lymphedema in weakness


Plan: 





Continue bronchodilators





Continue gentle diuresis





Antibiotics





Diuretics





Supplemental oxygen, titrated oxygen down as tolerated





IV steroids





Further plan of care as per clinical response of the patient





Continue to raise left upper extremity


Time with Patient: Greater than 30

## 2022-12-10 NOTE — P.PN
Subjective


Progress Note Date: 12/10/22


Principal diagnosis: 


Right diabetic foot infection





Patient is a 62-year-old  male with a past medical history taken for 

diabetes mellitus presenting to the hospital 4 days ago on 12/3/2022 for 

evaluation of increasing shortness of breath and lower extremity swelling ladan

ent also have wound on the plantar aspect of the right foot which has been there

for for more than a week before presentation to the hospital patient did have 

local cultures obtained growing MSSA and anaerobes.  Patient is status post 

bedside debridement of his right foot plantar wound by vascular surgery mention 

wound was extending on into the subcutaneous tissue no involvement of the bone, 

procedure completed on 12/08/2022


On today's evaluation that is 12/10/2022, the patient denies any fever or 

chills, the patient is breathing slightly comfortably on a 2 L nasal cannula, 

the patient denies having any chest pain occasional cough no nausea no vomiting 

no abdominal pain and denies having any pain to the right foot plantar wound 

area








Objective





- Vital Signs


Vital signs: 


                                   Vital Signs











Temp  98.3 F   12/10/22 08:33


 


Pulse  101 H  12/10/22 13:06


 


Resp  18   12/10/22 11:33


 


BP  134/76   12/10/22 11:33


 


Pulse Ox  96   12/10/22 11:33


 


FiO2      








                                 Intake & Output











 12/09/22 12/10/22 12/10/22





 18:59 06:59 18:59


 


Intake Total 354 240 240


 


Output Total 900 1075 


 


Balance -546 835 240


 


Intake:   


 


  Oral 354 240 240


 


Output:   


 


  Urine 900 1075 


 


Other:   


 


  Voiding Method Urinal Urinal Urinal





 Diaper Diaper Diaper


 


  # Voids  1 














- Exam


GENERAL DESCRIPTION: An elderly male lying in bed in no distress





RESPIRATORY SYSTEM: Unlabored breathing , decreased breath sounds at bases





HEART: S1 S2 regular rate and rhythm ,





ABDOMEN: Soft , no tenderness





EXTREMITIES: Right foot plantar wound is currently dressed, no drainage on the 

dressing








- Labs


CBC & Chem 7: 


                                 12/10/22 09:13





                                 12/10/22 09:13


Labs: 


                  Abnormal Lab Results - Last 24 Hours (Table)











  12/09/22 12/09/22 12/10/22 Range/Units





  16:27 19:45 05:49 


 


WBC     (3.8-10.6)  k/uL


 


RBC     (4.30-5.90)  m/uL


 


Hgb     (13.0-17.5)  gm/dL


 


Hct     (39.0-53.0)  %


 


MCH     (25.0-35.0)  pg


 


MCHC     (31.0-37.0)  g/dL


 


RDW     (11.5-15.5)  %


 


Plt Count     (150-450)  k/uL


 


Neutrophils #     (1.3-7.7)  k/uL


 


Sodium     (137-145)  mmol/L


 


Chloride     ()  mmol/L


 


Carbon Dioxide     (22-30)  mmol/L


 


Creatinine     (0.66-1.25)  mg/dL


 


Glucose     (74-99)  mg/dL


 


POC Glucose (mg/dL)  221 H  192 H  174 H  ()  mg/dL


 


Calcium     (8.4-10.2)  mg/dL














  12/10/22 12/10/22 12/10/22 Range/Units





  09:13 09:13 11:48 


 


WBC  13.9 H    (3.8-10.6)  k/uL


 


RBC  4.06 L    (4.30-5.90)  m/uL


 


Hgb  9.6 L    (13.0-17.5)  gm/dL


 


Hct  34.4 L    (39.0-53.0)  %


 


MCH  23.7 L    (25.0-35.0)  pg


 


MCHC  27.9 L    (31.0-37.0)  g/dL


 


RDW  16.3 H    (11.5-15.5)  %


 


Plt Count  499 H    (150-450)  k/uL


 


Neutrophils #  11.8 H    (1.3-7.7)  k/uL


 


Sodium   136 L   (137-145)  mmol/L


 


Chloride   89 L   ()  mmol/L


 


Carbon Dioxide   40 H   (22-30)  mmol/L


 


Creatinine   0.51 L   (0.66-1.25)  mg/dL


 


Glucose   122 H   (74-99)  mg/dL


 


POC Glucose (mg/dL)    211 H  ()  mg/dL


 


Calcium   8.3 L   (8.4-10.2)  mg/dL








                      Microbiology - Last 24 Hours (Table)











 12/08/22 17:57 Gram Stain - Preliminary





 Foot - Right Tissue Culture - Preliminary





    Presumptive Staph aureus














Assessment and Plan


(1) Diabetic foot infection


Current Visit: Yes   Status: Acute   Code(s): E11.628 - TYPE 2 DIABETES MELLITUS

WITH OTHER SKIN COMPLICATIONS; L08.9 - LOCAL INFECTION OF THE SKIN AND 

SUBCUTANEOUS TISSUE, UNSP   SNOMED Code(s): 217973793


   





(2) Wound of foot


Current Visit: Yes   Status: Acute   Code(s): S91.309A - UNSPECIFIED OPEN WOUND,

UNSPECIFIED FOOT, INITIAL ENCOUNTER   SNOMED Code(s): 615299525


   


Plan: 


1patient with right diabetic foot wound with secondary infection cultures are 

growing MSSA and anaerobes patient did have a wound with slough tissue and 

concern for underlying deep infection.


2patient is status post vascular surgery evaluation and debridement of the 

right foot plantar wound along with deep culture which are currently growing 

staph aureus


3patient to continue with Unasyn 3 g per 6-hour while inpatient and hopefully 

transition to oral antibodies on discharge


Time with Patient: Less than 30

## 2022-12-10 NOTE — P.PN
Subjective


Progress Note Date: 12/09/22


Principal diagnosis: 


Right diabetic foot infection





Patient is a 62-year-old  male with a past medical history taken for 

diabetes mellitus presenting to the hospital 4 days ago on 12/3/2022 for 

evaluation of increasing shortness of breath and lower extremity swelling ladan

ent also have wound on the plantar aspect of the right foot which has been there

for for more than a week before presentation to the hospital patient did have 

local cultures obtained growing MSSA and anaerobes.  Patient is status post 

bedside debridement of his right foot plantar wound by vascular surgery mention 

wound was extending on into the subcutaneous tissue no involvement of the bone, 

procedure completed on 12/08/2022


On today's evaluation that is 12/09/2022, the patient remains to be afebrile, 

the patient is breathing slightly comfortably, the patient denies having any 

chest pain occasional cough no nausea no vomiting no abdominal pain and denies 

having any pain to the right foot plantar wound area








Objective





- Vital Signs


Vital signs: 


                                   Vital Signs











Temp  97.9 F   12/09/22 08:00


 


Pulse  86   12/09/22 11:46


 


Resp  18   12/09/22 08:00


 


BP  122/64   12/09/22 08:00


 


Pulse Ox  95   12/09/22 08:00


 


FiO2      








                                 Intake & Output











 12/08/22 12/09/22 12/09/22





 18:59 06:59 18:59


 


Intake Total 360 420 118


 


Output Total 425 350 600


 


Balance -65 70 -482


 


Weight 99.4 kg 105 kg 


 


Intake:   


 


  Oral 360 420 118


 


Output:   


 


  Urine 425 350 600


 


Other:   


 


  Voiding Method Urinal Urinal Urinal





 Diaper Diaper Diaper


 


  # Voids  2 














- Exam


GENERAL DESCRIPTION: An elderly male lying in bed in no distress





RESPIRATORY SYSTEM: Unlabored breathing , decreased breath sounds at bases





HEART: S1 S2 regular rate and rhythm ,





ABDOMEN: Soft , no tenderness





EXTREMITIES: Right foot plantar wound is currently dressed, no drainage on the 

dressing








- Labs


CBC & Chem 7: 


                                 12/10/22 09:13





                                 12/10/22 09:13


Labs: 


                  Abnormal Lab Results - Last 24 Hours (Table)











  12/08/22 12/08/22 12/09/22 Range/Units





  17:05 20:14 05:49 


 


WBC     (3.8-10.6)  k/uL


 


RBC     (4.30-5.90)  m/uL


 


Hgb     (13.0-17.5)  gm/dL


 


Hct     (39.0-53.0)  %


 


MCH     (25.0-35.0)  pg


 


MCHC     (31.0-37.0)  g/dL


 


RDW     (11.5-15.5)  %


 


Plt Count     (150-450)  k/uL


 


Neutrophils #     (1.3-7.7)  k/uL


 


Chloride     ()  mmol/L


 


Carbon Dioxide     (22-30)  mmol/L


 


BUN     (9-20)  mg/dL


 


Creatinine     (0.66-1.25)  mg/dL


 


Glucose     (74-99)  mg/dL


 


POC Glucose (mg/dL)  229 H  203 H  203 H  ()  mg/dL


 


Calcium     (8.4-10.2)  mg/dL














  12/09/22 12/09/22 12/09/22 Range/Units





  09:06 09:06 11:53 


 


WBC  16.6 H    (3.8-10.6)  k/uL


 


RBC  4.01 L    (4.30-5.90)  m/uL


 


Hgb  9.4 L    (13.0-17.5)  gm/dL


 


Hct  34.1 L    (39.0-53.0)  %


 


MCH  23.4 L    (25.0-35.0)  pg


 


MCHC  27.5 L    (31.0-37.0)  g/dL


 


RDW  16.1 H    (11.5-15.5)  %


 


Plt Count  590 H    (150-450)  k/uL


 


Neutrophils #  14.5 H    (1.3-7.7)  k/uL


 


Chloride   89 L   ()  mmol/L


 


Carbon Dioxide   41 H*   (22-30)  mmol/L


 


BUN   22 H   (9-20)  mg/dL


 


Creatinine   0.60 L   (0.66-1.25)  mg/dL


 


Glucose   135 H   (74-99)  mg/dL


 


POC Glucose (mg/dL)    185 H  ()  mg/dL


 


Calcium   8.2 L   (8.4-10.2)  mg/dL








                      Microbiology - Last 24 Hours (Table)











 12/08/22 17:57 Tissue Culture - Preliminary





 Foot - Right 














Assessment and Plan


(1) Diabetic infection of left foot


Current Visit: Yes   Status: Acute   Code(s): E11.628 - TYPE 2 DIABETES MELLITUS

WITH OTHER SKIN COMPLICATIONS; L08.9 - LOCAL INFECTION OF THE SKIN AND 

SUBCUTANEOUS TISSUE, UNSP   SNOMED Code(s): 77438574


   


Plan: 


1patient with right diabetic foot wound with secondary infection cultures are 

growing MSSA and anaerobes patient did have a wound with slough tissue and 

concern for underlying deep infection.


2patient is status post vascular surgery evaluation and debridement of the rig

ht foot plantar wound along with deep culture which are currently pending.


3local wound care to continue with the Santyl.


4patient to continue with Unasyn 3 g per 6-hour while inpatient and monitor 

clinical course closely.


 


Time with Patient: Less than 30

## 2022-12-10 NOTE — P.PN
Subjective


Progress Note Date: 12/10/22


Principal diagnosis: 





Acute on chronic hypoxic respiratory failure appeared to be related to 

combination diastolic heart failure and COPD exacerbation





Acute COPD exacerbation





Lung mass with invasion to chest wall distraction involving the ribs





Metastatic stage IV lung cancer





Anasarca and fluid overload





Bilateral small pleural effusion more so on the right side compared left side


12/10/2022, today's evaluation patient is more lethargic somnolent and family is

present bedside, patient remains on bronchodilators along with IV steroids and 

broad-spectrum antibiotics, noted that vascular surgery has been following as 

well for her wounds





12/8/2022, patient seen eval reexamined awake and alert, denies any chest pain 

ongoing shortness of breath is present, patient remains on 4 L oxygen, patient 

has been on Lasix for acute exacerbation of congestive heart failure also on 

bronchodilators and broad-spectrum antibiotics, with IV steroids tolerating well





12/07/2022, patient seen and evaluated examined during the rounds labs reviewed 

medications reviewed, shortness of breath slightly improved however remains on 4

L oxygen ongoing dry cough is present, left upper extremity is weak swelling in 

second due to lymphedema, and labs from today's reviewed white cell count is 

13,400, hemoglobin hematocrit 9.3/33, platelet count 6 and 17,000, 

BUN/creatinine 17/0.53 sodium is 135 potassium 4.5 CO2 is 39 which is 

progressively declining now





Patient is a 62-year-old male with prior medical history of lung cancer has been

on immunotherapy with Dr. Schaefer also has a history of chronic hypoxic respiratory

failure on home oxygen 2 L history of smoking and nicotine use hypertension 

hypertensive cardiovascular disease.  Patient presented into the hospital with 

increasing shortness of breath of several day duration as well as increasing 

lower extremity swelling currently patient has been on IV Lasix switched to by 

mouth, and responding well in addition patient has been on bronchodilator 

doxycycline and his home medications, echocardiogram revealed normal LV function

with moderate aortic stenosis with a peak gradient is 37 and a mean gradient 22 

and mild mitral stenosis patient has been in negative balance.  Chest x-ray on 

admission revealed bilateral pulmonary infiltrate with pleural thickening 

increase more so on the right side compared to left side, venous Doppler study 

of the negative for DVT, computed tomography scan of the chest revealed no 

evidence of pulmonary embolism, pleural thickening and destructive changes noted

in the left upper lateral leads along with large mass in the upper lateral chest

wall, and multiple nodular nodular masses bilaterally in lung field with right-

sided pleural effusion, effusion has increased compared to prior exam in Novem ber 2022








Objective





- Vital Signs


Vital signs: 


                                   Vital Signs











Temp  98.3 F   12/10/22 08:33


 


Pulse  101 H  12/10/22 13:06


 


Resp  18   12/10/22 11:33


 


BP  134/76   12/10/22 11:33


 


Pulse Ox  96   12/10/22 11:33


 


FiO2      








                                 Intake & Output











 12/09/22 12/10/22 12/10/22





 18:59 06:59 18:59


 


Intake Total 354 240 240


 


Output Total 900 1075 


 


Balance -546 -835 240


 


Intake:   


 


  Oral 354 240 240


 


Output:   


 


  Urine 900 1075 


 


Other:   


 


  Voiding Method Urinal Urinal Urinal





 Diaper Diaper Diaper


 


  # Voids  1 














- Exam


- Constitutional


General appearance: average body habitus, cooperative, disheveled, mild 

distress, more somnolent than baseline





- EENT


Eyes: EOMI, PERRLA


ENT: normal oropharynx


Ears: bilateral: normal





- Neck


Carotids: bilateral: upstroke normal





- Respiratory


Respiratory: bilateral: diminished, dullness (Bilaterally in the bases)





- Cardiovascular


Rhythm: regular


Heart sounds: normal: S1, S2





- Gastrointestinal


General gastrointestinal: hyperactive bowel sounds





- Integumentary





Bilateral pedal edema with chronic skin changes


Integumentary: decreased turgor





- Neurologic


Neurologic: CNII-XII intact





- Musculoskeletal


Musculoskeletal: gait normal, generalized weakness, strength equal bilaterally





- Psychiatric


Psychiatric: More somnolent baseline, 








- Labs


CBC & Chem 7: 


                                 12/10/22 09:13





                                 12/10/22 09:13


Labs: 


                  Abnormal Lab Results - Last 24 Hours (Table)











  12/09/22 12/09/22 12/10/22 Range/Units





  16:27 19:45 05:49 


 


WBC     (3.8-10.6)  k/uL


 


RBC     (4.30-5.90)  m/uL


 


Hgb     (13.0-17.5)  gm/dL


 


Hct     (39.0-53.0)  %


 


MCH     (25.0-35.0)  pg


 


MCHC     (31.0-37.0)  g/dL


 


RDW     (11.5-15.5)  %


 


Plt Count     (150-450)  k/uL


 


Neutrophils #     (1.3-7.7)  k/uL


 


Sodium     (137-145)  mmol/L


 


Chloride     ()  mmol/L


 


Carbon Dioxide     (22-30)  mmol/L


 


Creatinine     (0.66-1.25)  mg/dL


 


Glucose     (74-99)  mg/dL


 


POC Glucose (mg/dL)  221 H  192 H  174 H  ()  mg/dL


 


Calcium     (8.4-10.2)  mg/dL














  12/10/22 12/10/22 12/10/22 Range/Units





  09:13 09:13 11:48 


 


WBC  13.9 H    (3.8-10.6)  k/uL


 


RBC  4.06 L    (4.30-5.90)  m/uL


 


Hgb  9.6 L    (13.0-17.5)  gm/dL


 


Hct  34.4 L    (39.0-53.0)  %


 


MCH  23.7 L    (25.0-35.0)  pg


 


MCHC  27.9 L    (31.0-37.0)  g/dL


 


RDW  16.3 H    (11.5-15.5)  %


 


Plt Count  499 H    (150-450)  k/uL


 


Neutrophils #  11.8 H    (1.3-7.7)  k/uL


 


Sodium   136 L   (137-145)  mmol/L


 


Chloride   89 L   ()  mmol/L


 


Carbon Dioxide   40 H   (22-30)  mmol/L


 


Creatinine   0.51 L   (0.66-1.25)  mg/dL


 


Glucose   122 H   (74-99)  mg/dL


 


POC Glucose (mg/dL)    211 H  ()  mg/dL


 


Calcium   8.3 L   (8.4-10.2)  mg/dL








                      Microbiology - Last 24 Hours (Table)











 12/08/22 17:57 Gram Stain - Preliminary





 Foot - Right Tissue Culture - Preliminary





    Presumptive Staph aureus














Assessment and Plan


Assessment: 


Altered mental status/increased lethargy





Acute on chronic hypoxic respiratory failure appeared to be related to 

combination diastolic heart failure and COPD exacerbation





Acute COPD exacerbation





Lung mass with invasion to chest wall distraction involving the ribs





Metastatic stage IV lung cancer





Anasarca and fluid overload





Bilateral small pleural effusion more so on the right side compared left side





Chronic left upper extremity lymphedema in weakness


Plan: 


Will get arterial blood gas on the 2 L oxygen to assess hypercapnia in case if 

present then consider BiPAP trial





Continue bronchodilators





Continue gentle diuresis





Antibiotics





Diuretics





Supplemental oxygen, titrated oxygen down as tolerated





IV steroids





Further plan of care as per clinical response of the patient





Continue to raise left upper extremity


Time with Patient: Greater than 30

## 2022-12-10 NOTE — P.PN
Subjective


Progress Note Date: 12/08/22


Principal diagnosis: 


Right diabetic foot infection





Patient is a 62-year-old  male with a past medical history taken for 

diabetes mellitus presenting to the hospital 4 days ago on 12/3/2022 for 

evaluation of increasing shortness of breath and lower extremity swelling ladan

ent also have wound on the plantar aspect of the right foot which has been there

for for more than a week before presentation to the hospital patient did have 

local cultures obtained growing MSSA and anaerobes.


On today's evaluation that is 12/08/2022, the patient denies having any fever or

chills he is breathing slightly comfortably patient denies having any chest pain

occasional cough no nausea no vomiting no abdominal pain and denies having any 

pain to the right foot plantar wound area








Objective





- Vital Signs


Vital signs: 


                                   Vital Signs











Temp  98.2 F   12/08/22 08:00


 


Pulse  100   12/08/22 12:00


 


Resp  16   12/08/22 12:00


 


BP  134/81   12/08/22 12:00


 


Pulse Ox  95   12/08/22 12:00


 


FiO2      








                                 Intake & Output











 12/07/22 12/08/22 12/08/22





 18:59 06:59 18:59


 


Intake Total 540  180


 


Output Total 225 750 


 


Balance 315 -750 180


 


Weight  99.4 kg 99.4 kg


 


Intake:   


 


  Oral 540  180


 


Output:   


 


  Urine 225 750 


 


Other:   


 


  Voiding Method Diaper Urinal Urinal





  Diaper Diaper














- Exam


GENERAL DESCRIPTION: An elderly male lying in bed in no distress





RESPIRATORY SYSTEM: Unlabored breathing , decreased breath sounds at bases





HEART: S1 S2 regular rate and rhythm ,





ABDOMEN: Soft , no tenderness





EXTREMITIES: Right foot plantar wound is currently dressed








- Labs


CBC & Chem 7: 


                                 12/10/22 09:13





                                 12/10/22 09:13


Labs: 


                  Abnormal Lab Results - Last 24 Hours (Table)











  12/07/22 12/07/22 12/08/22 Range/Units





  16:43 20:36 05:57 


 


POC Glucose (mg/dL)  260 H  246 H  230 H  ()  mg/dL














  12/08/22 Range/Units





  11:44 


 


POC Glucose (mg/dL)  196 H  ()  mg/dL








                      Microbiology - Last 24 Hours (Table)











 12/03/22 21:16 Anaerobic Culture - Final





 Foot - Right    Anaerobic Gm Negative Bacilli





    Anaerobic Gram Positive Cocci














Assessment and Plan


(1) Diabetic infection of left foot


Current Visit: Yes   Status: Acute   Code(s): E11.628 - TYPE 2 DIABETES MELLITUS

WITH OTHER SKIN COMPLICATIONS; L08.9 - LOCAL INFECTION OF THE SKIN AND 

SUBCUTANEOUS TISSUE, UNSP   SNOMED Code(s): 09749354


   


Plan: 


1patient with right diabetic foot wound with secondary infection cultures are 

growing MSSA and anaerobes patient did have a wound with slough tissue and 

concern for underlying deep infection.


2patient is currently waiting for vascular surgery evaluation for debridement 

and deep culture.


3local wound care to continue with the Santyl.


4patient to continue with Unasyn 3 g per 6-hour.


 


Time with Patient: Less than 30

## 2022-12-11 LAB
ANION GAP SERPL CALC-SCNC: 4 MMOL/L
BASOPHILS # BLD AUTO: 0 K/UL (ref 0–0.2)
BASOPHILS NFR BLD AUTO: 0 %
BUN SERPL-SCNC: 18 MG/DL (ref 9–20)
CALCIUM SPEC-MCNC: 8.2 MG/DL (ref 8.4–10.2)
CHLORIDE SERPL-SCNC: 87 MMOL/L (ref 98–107)
CO2 SERPL-SCNC: 42 MMOL/L (ref 22–30)
EOSINOPHIL # BLD AUTO: 0 K/UL (ref 0–0.7)
EOSINOPHIL NFR BLD AUTO: 0 %
ERYTHROCYTE [DISTWIDTH] IN BLOOD BY AUTOMATED COUNT: 3.73 M/UL (ref 4.3–5.9)
ERYTHROCYTE [DISTWIDTH] IN BLOOD: 16.1 % (ref 11.5–15.5)
GLUCOSE BLD-MCNC: 133 MG/DL (ref 70–110)
GLUCOSE BLD-MCNC: 157 MG/DL (ref 70–110)
GLUCOSE BLD-MCNC: 195 MG/DL (ref 70–110)
GLUCOSE BLD-MCNC: 197 MG/DL (ref 70–110)
GLUCOSE SERPL-MCNC: 170 MG/DL (ref 74–99)
HCT VFR BLD AUTO: 31.5 % (ref 39–53)
HGB BLD-MCNC: 8.7 GM/DL (ref 13–17.5)
LYMPHOCYTES # SPEC AUTO: 0.9 K/UL (ref 1–4.8)
LYMPHOCYTES NFR SPEC AUTO: 7 %
MCH RBC QN AUTO: 23.4 PG (ref 25–35)
MCHC RBC AUTO-ENTMCNC: 27.7 G/DL (ref 31–37)
MCV RBC AUTO: 84.4 FL (ref 80–100)
MONOCYTES # BLD AUTO: 0.5 K/UL (ref 0–1)
MONOCYTES NFR BLD AUTO: 4 %
NEUTROPHILS # BLD AUTO: 10.5 K/UL (ref 1.3–7.7)
NEUTROPHILS NFR BLD AUTO: 88 %
PLATELET # BLD AUTO: 471 K/UL (ref 150–450)
POTASSIUM SERPL-SCNC: 4.3 MMOL/L (ref 3.5–5.1)
SODIUM SERPL-SCNC: 133 MMOL/L (ref 137–145)
WBC # BLD AUTO: 12 K/UL (ref 3.8–10.6)

## 2022-12-11 RX ADMIN — LIDOCAINE SCH PATCH: 50 PATCH TOPICAL at 15:07

## 2022-12-11 RX ADMIN — AMPICILLIN SODIUM AND SULBACTAM SODIUM SCH MLS/HR: 2; 1 INJECTION, POWDER, FOR SOLUTION INTRAMUSCULAR; INTRAVENOUS at 11:14

## 2022-12-11 RX ADMIN — PREGABALIN SCH: 100 CAPSULE ORAL at 21:51

## 2022-12-11 RX ADMIN — FAMOTIDINE SCH MG: 20 TABLET, FILM COATED ORAL at 07:48

## 2022-12-11 RX ADMIN — IPRATROPIUM BROMIDE AND ALBUTEROL SULFATE SCH ML: .5; 3 SOLUTION RESPIRATORY (INHALATION) at 11:42

## 2022-12-11 RX ADMIN — METOPROLOL TARTRATE SCH MG: 25 TABLET, FILM COATED ORAL at 07:48

## 2022-12-11 RX ADMIN — AMPICILLIN SODIUM AND SULBACTAM SODIUM SCH MLS/HR: 2; 1 INJECTION, POWDER, FOR SOLUTION INTRAMUSCULAR; INTRAVENOUS at 17:02

## 2022-12-11 RX ADMIN — FAMOTIDINE SCH: 20 TABLET, FILM COATED ORAL at 22:21

## 2022-12-11 RX ADMIN — FUROSEMIDE SCH MG: 40 TABLET ORAL at 15:06

## 2022-12-11 RX ADMIN — LORATADINE SCH MG: 10 TABLET ORAL at 07:48

## 2022-12-11 RX ADMIN — IPRATROPIUM BROMIDE AND ALBUTEROL SULFATE SCH ML: .5; 3 SOLUTION RESPIRATORY (INHALATION) at 08:13

## 2022-12-11 RX ADMIN — INSULIN ASPART SCH UNIT: 100 INJECTION, SOLUTION INTRAVENOUS; SUBCUTANEOUS at 06:09

## 2022-12-11 RX ADMIN — HEPARIN SODIUM SCH UNIT: 5000 INJECTION INTRAVENOUS; SUBCUTANEOUS at 15:06

## 2022-12-11 RX ADMIN — MORPHINE SULFATE SCH MG: 30 TABLET, FILM COATED, EXTENDED RELEASE ORAL at 07:48

## 2022-12-11 RX ADMIN — METOPROLOL TARTRATE SCH: 25 TABLET, FILM COATED ORAL at 22:21

## 2022-12-11 RX ADMIN — METHYLPREDNISOLONE SODIUM SUCCINATE SCH: 40 INJECTION, POWDER, FOR SOLUTION INTRAMUSCULAR; INTRAVENOUS at 22:21

## 2022-12-11 RX ADMIN — FUROSEMIDE SCH MG: 40 TABLET ORAL at 07:48

## 2022-12-11 RX ADMIN — DOCUSATE SODIUM SCH MG: 100 CAPSULE, LIQUID FILLED ORAL at 07:47

## 2022-12-11 RX ADMIN — PREGABALIN SCH MG: 100 CAPSULE ORAL at 07:47

## 2022-12-11 RX ADMIN — COLLAGENASE SANTYL SCH APPLIC: 250 OINTMENT TOPICAL at 07:48

## 2022-12-11 RX ADMIN — IPRATROPIUM BROMIDE AND ALBUTEROL SULFATE SCH ML: .5; 3 SOLUTION RESPIRATORY (INHALATION) at 15:58

## 2022-12-11 RX ADMIN — INSULIN ASPART SCH UNIT: 100 INJECTION, SOLUTION INTRAVENOUS; SUBCUTANEOUS at 17:02

## 2022-12-11 RX ADMIN — AMPICILLIN SODIUM AND SULBACTAM SODIUM SCH MLS/HR: 2; 1 INJECTION, POWDER, FOR SOLUTION INTRAMUSCULAR; INTRAVENOUS at 23:41

## 2022-12-11 RX ADMIN — METHYLPREDNISOLONE SODIUM SUCCINATE SCH MG: 40 INJECTION, POWDER, FOR SOLUTION INTRAMUSCULAR; INTRAVENOUS at 07:47

## 2022-12-11 RX ADMIN — MORPHINE SULFATE SCH MG: 30 TABLET, FILM COATED, EXTENDED RELEASE ORAL at 15:06

## 2022-12-11 RX ADMIN — IPRATROPIUM BROMIDE AND ALBUTEROL SULFATE SCH ML: .5; 3 SOLUTION RESPIRATORY (INHALATION) at 19:39

## 2022-12-11 RX ADMIN — INSULIN ASPART SCH: 100 INJECTION, SOLUTION INTRAVENOUS; SUBCUTANEOUS at 21:42

## 2022-12-11 RX ADMIN — DOCUSATE SODIUM SCH: 100 CAPSULE, LIQUID FILLED ORAL at 22:21

## 2022-12-11 RX ADMIN — MORPHINE SULFATE SCH: 30 TABLET, FILM COATED, EXTENDED RELEASE ORAL at 21:50

## 2022-12-11 RX ADMIN — HYDROCODONE BITARTRATE AND ACETAMINOPHEN PRN EACH: 5; 325 TABLET ORAL at 23:39

## 2022-12-11 RX ADMIN — INSULIN ASPART SCH UNIT: 100 INJECTION, SOLUTION INTRAVENOUS; SUBCUTANEOUS at 12:21

## 2022-12-11 RX ADMIN — AMPICILLIN SODIUM AND SULBACTAM SODIUM SCH MLS/HR: 2; 1 INJECTION, POWDER, FOR SOLUTION INTRAMUSCULAR; INTRAVENOUS at 06:09

## 2022-12-11 RX ADMIN — HEPARIN SODIUM SCH UNIT: 5000 INJECTION INTRAVENOUS; SUBCUTANEOUS at 07:47

## 2022-12-11 RX ADMIN — HEPARIN SODIUM SCH UNIT: 5000 INJECTION INTRAVENOUS; SUBCUTANEOUS at 23:41

## 2022-12-11 RX ADMIN — Medication SCH MG: at 07:48

## 2022-12-11 NOTE — P.PN
Subjective


Progress Note Date: 12/11/22


Principal diagnosis: 


Right diabetic foot infection





Patient is a 62-year-old  male with a past medical history taken for 

diabetes mellitus presenting to the hospital 4 days ago on 12/3/2022 for 

evaluation of increasing shortness of breath and lower extremity swelling ladan

ent also have wound on the plantar aspect of the right foot which has been there

for for more than a week before presentation to the hospital patient did have 

local cultures obtained growing MSSA and anaerobes.  Patient is status post 

bedside debridement of his right foot plantar wound by vascular surgery mention 

wound was extending on into the subcutaneous tissue no involvement of the bone, 

procedure completed on 12/08/2022


On today's evaluation that is 12/11/2022, the patient remains to be afebrile, 

the patient is breathing slightly comfortably on a 2 L nasal cannula, the 

patient denies  chest pain, the patient did have occasional cough no nausea no 

vomiting no abdominal pain and the patient denies having any pain to the right 

foot plantar wound area








Objective





- Vital Signs


Vital signs: 


                                   Vital Signs











Temp  97.8 F   12/11/22 15:05


 


Pulse  84   12/11/22 15:58


 


Resp  18   12/11/22 15:05


 


BP  99/63   12/11/22 15:05


 


Pulse Ox  95   12/11/22 15:58


 


FiO2      








                                 Intake & Output











 12/10/22 12/11/22 12/11/22





 18:59 06:59 18:59


 


Intake Total 240 240 118


 


Output Total  1600 500


 


Balance 240 -9100 382


 


Intake:   


 


  Oral 240 240 118


 


Output:   


 


  Urine  1600 500


 


Other:   


 


  Voiding Method Urinal Urinal Urinal





 Diaper  Diaper


 


  # Voids  1 














- Exam


GENERAL DESCRIPTION: An elderly male lying in bed in no distress





RESPIRATORY SYSTEM: Unlabored breathing , decreased breath sounds at bases





HEART: S1 S2 regular rate and rhythm ,





ABDOMEN: Soft , no tenderness





EXTREMITIES: Right foot plantar wound is currently dressed, no drainage on the 

dressing








- Labs


CBC & Chem 7: 


                                 12/11/22 03:53





                                 12/11/22 03:53


Labs: 


                  Abnormal Lab Results - Last 24 Hours (Table)











  12/10/22 12/10/22 12/11/22 Range/Units





  16:47 19:53 03:53 


 


WBC    12.0 H  (3.8-10.6)  k/uL


 


RBC    3.73 L  (4.30-5.90)  m/uL


 


Hgb    8.7 L  (13.0-17.5)  gm/dL


 


Hct    31.5 L  (39.0-53.0)  %


 


MCH    23.4 L  (25.0-35.0)  pg


 


MCHC    27.7 L  (31.0-37.0)  g/dL


 


RDW    16.1 H  (11.5-15.5)  %


 


Plt Count    471 H  (150-450)  k/uL


 


Neutrophils #    10.5 H  (1.3-7.7)  k/uL


 


Lymphocytes #    0.9 L  (1.0-4.8)  k/uL


 


Sodium     (137-145)  mmol/L


 


Chloride     ()  mmol/L


 


Carbon Dioxide     (22-30)  mmol/L


 


Creatinine     (0.66-1.25)  mg/dL


 


Glucose     (74-99)  mg/dL


 


POC Glucose (mg/dL)  215 H  141 H   ()  mg/dL


 


Calcium     (8.4-10.2)  mg/dL














  12/11/22 12/11/22 12/11/22 Range/Units





  03:53 05:50 12:05 


 


WBC     (3.8-10.6)  k/uL


 


RBC     (4.30-5.90)  m/uL


 


Hgb     (13.0-17.5)  gm/dL


 


Hct     (39.0-53.0)  %


 


MCH     (25.0-35.0)  pg


 


MCHC     (31.0-37.0)  g/dL


 


RDW     (11.5-15.5)  %


 


Plt Count     (150-450)  k/uL


 


Neutrophils #     (1.3-7.7)  k/uL


 


Lymphocytes #     (1.0-4.8)  k/uL


 


Sodium  133 L    (137-145)  mmol/L


 


Chloride  87 L    ()  mmol/L


 


Carbon Dioxide  42 H*    (22-30)  mmol/L


 


Creatinine  0.48 L    (0.66-1.25)  mg/dL


 


Glucose  170 H    (74-99)  mg/dL


 


POC Glucose (mg/dL)   157 H  195 H  ()  mg/dL


 


Calcium  8.2 L    (8.4-10.2)  mg/dL








                      Microbiology - Last 24 Hours (Table)











 12/08/22 17:57 Gram Stain - Preliminary





 Foot - Right Tissue Culture - Preliminary





    Staphylococcus aureus





    Gram Neg Bacilli














Assessment and Plan


(1) Diabetic foot infection


Current Visit: Yes   Status: Acute   Code(s): E11.628 - TYPE 2 DIABETES MELLITUS

WITH OTHER SKIN COMPLICATIONS; L08.9 - LOCAL INFECTION OF THE SKIN AND 

SUBCUTANEOUS TISSUE, UNSP   SNOMED Code(s): 819277572


   





(2) Wound of foot


Current Visit: Yes   Status: Acute   Code(s): S91.309A - UNSPECIFIED OPEN WOUND,

UNSPECIFIED FOOT, INITIAL ENCOUNTER   SNOMED Code(s): 604848192


   


Plan: 


1patient with right diabetic foot wound with secondary infection cultures are 

growing MSSA and anaerobes patient did have a wound with slough tissue and 

concern for underlying deep infection.


2patient is status post vascular surgery evaluation and debridement of the 

right foot plantar wound along with deep culture growing MSSA and gram-negative 

with medicines due to spending


3patient seemed to have shown some clinical improvement as for his right foot 

wound is concerned and will continue with Unasyn 3 g per 6-hour adjusting it 

further on the basis of culture


Time with Patient: Less than 30

## 2022-12-11 NOTE — P.PN
Subjective


Progress Note Date: 12/10/22


Principal diagnosis: 





Right diabetic foot infection


Acute exacerbation CHF


NSTEMI


Metastatic lung cancer





62-year-old gentleman with past medical history significant for lung cancer on 

immunotherapy, congestive heart Failure, COPD, asthma who presented to the ER 

because of worsening shortness of breath and swelling of legs.  Patient has been

noticing increasing shortness of breath on exertion for the last couple of days 

associated with increased swelling of legs.  Patient was on diuretics at home 

but stated that it was not working and he continues to be short of breath.  

Patient also currently on antibiotics for right foot wound.  Because of this w

orsening shortness of breath and swelling of legs patient came to the ER.  

Initial lab work showed patient to have a white count of 12.9, hemoglobin of 

8.8, platelet count of 628, sodium 137, potassium 4.3, carbon dioxide 38, 

troponin 0.1.  CTA chest done showed no evidence of pulmonary embolism.  It did 

show pleural thickening and destructive changes in the left upper ribs.  Large 

mass involving the upper limits of chest wall was also seen.  Patient was 

admitted to hospitalist service for further evaluation and treatment





12/10/2022


Patient is seen and evaluated with family at bedside; denies any fever or 

chills, the patient is breathing slightly comfortably on a 2 L nasal cannula, 

the patient denies having any chest pain occasional cough no nausea no vomiting 

no abdominal pain and denies having any pain to the right foot plantar wound 

area


patient with right diabetic foot wound with secondary infection cultures are 

growing MSSA and anaerobes patient did have a wound with slough tissue and 

concern for underlying deep infection.


patient is status post vascular surgery evaluation and debridement of the right

foot plantar wound along with deep culture which are currently growing staph 

aureus


patient to continue with Unasyn 3 g per 6-hour while inpatient and hopefully 

transition to oral antibodies on discharge





Objective





- Vital Signs


Vital signs: 


                                   Vital Signs











Temp  98.3 F   12/10/22 08:33


 


Pulse  100   12/10/22 12:19


 


Resp  18   12/10/22 11:33


 


BP  134/76   12/10/22 11:33


 


Pulse Ox  96   12/10/22 11:33


 


FiO2      








                                 Intake & Output











 12/09/22 12/10/22 12/10/22





 18:59 06:59 18:59


 


Intake Total 354 240 240


 


Output Total 900 1075 


 


Balance -546 -835 240


 


Intake:   


 


  Oral 354 240 240


 


Output:   


 


  Urine 900 1075 


 


Other:   


 


  Voiding Method Urinal Urinal Urinal





 Diaper Diaper Diaper


 


  # Voids  1 














- Exam





GENERAL: The patient is alert and oriented x3, not in any acute distress. Well 

developed, well nourished. 


HEENT: Pupils are round and equally reacting to light. EOMI. No scleral icterus.

No conjunctival pallor. Normocephalic, atraumatic. No pharyngeal erythema. No 

thyromegaly. 


CARDIOVASCULAR: S1 and S2 present. No murmurs, rubs, or gallops. 


-PULMONARY: Chest is clear to auscultation, no wheezing .  Bilateral basal or 

crackles. 


ABDOMEN: Soft, nontender, nondistended, normoactive bowel sounds. No palpable 

organomegaly. 


MUSCULOSKELETAL: No joint swelling or deformity. 


-EXTREMITIES: No cyanosis, clubbing, or pedal mild B/L Castillo edema.  Left upper 

extremities as well as with limitation of movement [chronic] right foot ulcer on

 the sole with black eschar on the top, no surrounding cellulitis


NEUROLOGICAL: Gross neurological examination did not reveal any focal deficits. 


SKIN: No rashes. no petechiae.





- Labs


CBC & Chem 7: 


                                 12/11/22 03:53





                                 12/11/22 03:53


Labs: 


                  Abnormal Lab Results - Last 24 Hours (Table)











  12/09/22 12/09/22 12/10/22 Range/Units





  16:27 19:45 05:49 


 


WBC     (3.8-10.6)  k/uL


 


RBC     (4.30-5.90)  m/uL


 


Hgb     (13.0-17.5)  gm/dL


 


Hct     (39.0-53.0)  %


 


MCH     (25.0-35.0)  pg


 


MCHC     (31.0-37.0)  g/dL


 


RDW     (11.5-15.5)  %


 


Plt Count     (150-450)  k/uL


 


Neutrophils #     (1.3-7.7)  k/uL


 


Sodium     (137-145)  mmol/L


 


Chloride     ()  mmol/L


 


Carbon Dioxide     (22-30)  mmol/L


 


Creatinine     (0.66-1.25)  mg/dL


 


Glucose     (74-99)  mg/dL


 


POC Glucose (mg/dL)  221 H  192 H  174 H  ()  mg/dL


 


Calcium     (8.4-10.2)  mg/dL














  12/10/22 12/10/22 12/10/22 Range/Units





  09:13 09:13 11:48 


 


WBC  13.9 H    (3.8-10.6)  k/uL


 


RBC  4.06 L    (4.30-5.90)  m/uL


 


Hgb  9.6 L    (13.0-17.5)  gm/dL


 


Hct  34.4 L    (39.0-53.0)  %


 


MCH  23.7 L    (25.0-35.0)  pg


 


MCHC  27.9 L    (31.0-37.0)  g/dL


 


RDW  16.3 H    (11.5-15.5)  %


 


Plt Count  499 H    (150-450)  k/uL


 


Neutrophils #  11.8 H    (1.3-7.7)  k/uL


 


Sodium   136 L   (137-145)  mmol/L


 


Chloride   89 L   ()  mmol/L


 


Carbon Dioxide   40 H   (22-30)  mmol/L


 


Creatinine   0.51 L   (0.66-1.25)  mg/dL


 


Glucose   122 H   (74-99)  mg/dL


 


POC Glucose (mg/dL)    211 H  ()  mg/dL


 


Calcium   8.3 L   (8.4-10.2)  mg/dL








                      Microbiology - Last 24 Hours (Table)











 12/08/22 17:57 Gram Stain - Preliminary





 Foot - Right Tissue Culture - Preliminary





    Presumptive Staph aureus














Assessment and Plan


Assessment: 





Acute onset congestive heart failure.


None STEMI with elevated troponin


Moderate aortic stenosis


Lung cancer with multiple nodular pulmonary metastasis throughout both lung 

fields


Chronic left shoulder pain and swelling of the left upper extremity related to 

his history of cancer


Right foot infection with staph


Acute on chronic hypoxic respiratory failure


Right foot wound


Lung cancer


Diabetes mellitus


Hypertension





Plan: 





Continue with, switched to oral dose on 12/7 six


Strict I's and O's


Daily weights.


Monitor renal functions


Change antibiotics into doxycycline and follow-up wound culture


Consult cardiology.


Pulmonary team consult


Consult hematology oncology.


Infectious disease consult


Consult wound care


Labs and medication were reviewed..  Continue same treatment.  Continue with 

symptomatic treatment.  Resume home medication.  Monitor lytes and vitals.  DVT 

and GI prophylaxis.  Further recommendations as per clinical course of the 

patient


DVT prophylaxis: Subcutaneous heparin


GI Prophylaxis: Pepcid


Prognosis is guarded 


plan discussed with patient and family including wife and daughter at bedside 

and all their questions answered and they're agreeable with the current plan

## 2022-12-11 NOTE — P.PN
Subjective


Progress Note Date: 12/11/22


Principal diagnosis: 





Right diabetic foot infection


Acute exacerbation CHF


NSTEMI


Metastatic lung cancer





62-year-old gentleman with past medical history significant for lung cancer on 

immunotherapy, congestive heart Failure, COPD, asthma who presented to the ER 

because of worsening shortness of breath and swelling of legs.  Patient has been

noticing increasing shortness of breath on exertion for the last couple of days 

associated with increased swelling of legs.  Patient was on diuretics at home 

but stated that it was not working and he continues to be short of breath.  

Patient also currently on antibiotics for right foot wound.  Because of this w

orsening shortness of breath and swelling of legs patient came to the ER.  

Initial lab work showed patient to have a white count of 12.9, hemoglobin of 

8.8, platelet count of 628, sodium 137, potassium 4.3, carbon dioxide 38, 

troponin 0.1.  CTA chest done showed no evidence of pulmonary embolism.  It did 

show pleural thickening and destructive changes in the left upper ribs.  Large 

mass involving the upper limits of chest wall was also seen.  Patient was 

admitted to hospitalist service for further evaluation and treatment





12/10/2022


Patient is seen and evaluated with family at bedside; denies any fever or 

chills, the patient is breathing slightly comfortably on a 2 L nasal cannula, 

the patient denies having any chest pain occasional cough no nausea no vomiting 

no abdominal pain and denies having any pain to the right foot plantar wound 

area


patient with right diabetic foot wound with secondary infection cultures are 

growing MSSA and anaerobes patient did have a wound with slough tissue and 

concern for underlying deep infection.


patient is status post vascular surgery evaluation and debridement of the right

foot plantar wound along with deep culture which are currently growing staph 

aureus


patient to continue with Unasyn 3 g per 6-hour while inpatient and hopefully 

transition to oral antibodies on discharge





12/11/2022


Patient is seen and evaluated; breathing slightly comfortably on a 2 L nasal 

cannula, the patient denies  chest pain, the patient did have occasional cough 

no nausea no vomiting no abdominal pain and the patient denies having any pain 

to the right foot plantar wound area


Labs are reviewed and stable with WBC of 2.0, hemoglobin 8.7 and hematocrit of 

31.5 with platelet count of 471, sodium 133, potassium 4.3, BUN/creatinine of 

18/0.48 and blood was of 140


patient with right diabetic foot wound with secondary infection cultures are 

growing MSSA and anaerobes patient did have a wound with slough tissue and 

concern for underlying deep infection.


patient is status post vascular surgery evaluation and debridement of the right

foot plantar wound along with deep culture growing MSSA and gram-negative with 

medicines due to spending


patient seemed to have shown some clinical improvement as for his right foot 

wound is concerned and will continue with Unasyn 3 g per 6-hour adjusting it 

further on the basis of culture











Objective





- Vital Signs


Vital signs: 


                                   Vital Signs











Temp  97.6 F   12/11/22 07:41


 


Pulse  88   12/11/22 11:52


 


Resp  18   12/11/22 11:08


 


BP  95/61   12/11/22 11:08


 


Pulse Ox  90 L  12/11/22 11:08


 


FiO2      








                                 Intake & Output











 12/10/22 12/11/22 12/11/22





 18:59 06:59 18:59


 


Intake Total 240 240 


 


Output Total  1600 500


 


Balance 240 -1360 -500


 


Intake:   


 


  Oral 240 240 


 


Output:   


 


  Urine  1600 500


 


Other:   


 


  Voiding Method Urinal Urinal Urinal





 Diaper  Diaper


 


  # Voids  1 














- Exam





GENERAL: The patient is alert and oriented x3, not in any acute distress. Well 

developed, well nourished. 


HEENT: Pupils are round and equally reacting to light. EOMI. No scleral icterus.

No conjunctival pallor. Normocephalic, atraumatic. No pharyngeal erythema. No 

thyromegaly. 


CARDIOVASCULAR: S1 and S2 present. No murmurs, rubs, or gallops. 


-PULMONARY: Chest is clear to auscultation, no wheezing .  Bilateral basal or 

crackles. 


ABDOMEN: Soft, nontender, nondistended, normoactive bowel sounds. No palpable 

organomegaly. 


MUSCULOSKELETAL: No joint swelling or deformity. 


-EXTREMITIES: No cyanosis, clubbing, or pedal mild B/L Castillo edema.  Left upper 

extremities as well as with limitation of movement [chronic] right foot ulcer on

 the sole with black eschar on the top, no surrounding cellulitis


NEUROLOGICAL: Gross neurological examination did not reveal any focal deficits. 


SKIN: No rashes. no petechiae.





- Labs


CBC & Chem 7: 


                                 12/11/22 03:53





                                 12/11/22 03:53


Labs: 


                  Abnormal Lab Results - Last 24 Hours (Table)











  12/10/22 12/10/22 12/10/22 Range/Units





  14:37 16:47 19:53 


 


WBC     (3.8-10.6)  k/uL


 


RBC     (4.30-5.90)  m/uL


 


Hgb     (13.0-17.5)  gm/dL


 


Hct     (39.0-53.0)  %


 


MCH     (25.0-35.0)  pg


 


MCHC     (31.0-37.0)  g/dL


 


RDW     (11.5-15.5)  %


 


Plt Count     (150-450)  k/uL


 


Neutrophils #     (1.3-7.7)  k/uL


 


Lymphocytes #     (1.0-4.8)  k/uL


 


ABG pH  7.48 H    (7.35-7.45)  


 


ABG pCO2  62 H    (35-45)  mmHg


 


ABG pO2  59 L*    ()  mmHg


 


ABG HCO3  46 H*    (21-25)  mmol/L


 


ABG Total CO2  48 H    (19-24)  mmol/L


 


ABG O2 Saturation  91.5 L    (94-97)  %


 


Sodium     (137-145)  mmol/L


 


Chloride     ()  mmol/L


 


Carbon Dioxide     (22-30)  mmol/L


 


Creatinine     (0.66-1.25)  mg/dL


 


Glucose     (74-99)  mg/dL


 


POC Glucose (mg/dL)   215 H  141 H  ()  mg/dL


 


Calcium     (8.4-10.2)  mg/dL














  12/11/22 12/11/22 12/11/22 Range/Units





  03:53 03:53 05:50 


 


WBC  12.0 H    (3.8-10.6)  k/uL


 


RBC  3.73 L    (4.30-5.90)  m/uL


 


Hgb  8.7 L    (13.0-17.5)  gm/dL


 


Hct  31.5 L    (39.0-53.0)  %


 


MCH  23.4 L    (25.0-35.0)  pg


 


MCHC  27.7 L    (31.0-37.0)  g/dL


 


RDW  16.1 H    (11.5-15.5)  %


 


Plt Count  471 H    (150-450)  k/uL


 


Neutrophils #  10.5 H    (1.3-7.7)  k/uL


 


Lymphocytes #  0.9 L    (1.0-4.8)  k/uL


 


ABG pH     (7.35-7.45)  


 


ABG pCO2     (35-45)  mmHg


 


ABG pO2     ()  mmHg


 


ABG HCO3     (21-25)  mmol/L


 


ABG Total CO2     (19-24)  mmol/L


 


ABG O2 Saturation     (94-97)  %


 


Sodium   133 L   (137-145)  mmol/L


 


Chloride   87 L   ()  mmol/L


 


Carbon Dioxide   42 H*   (22-30)  mmol/L


 


Creatinine   0.48 L   (0.66-1.25)  mg/dL


 


Glucose   170 H   (74-99)  mg/dL


 


POC Glucose (mg/dL)    157 H  ()  mg/dL


 


Calcium   8.2 L   (8.4-10.2)  mg/dL














  12/11/22 Range/Units





  12:05 


 


WBC   (3.8-10.6)  k/uL


 


RBC   (4.30-5.90)  m/uL


 


Hgb   (13.0-17.5)  gm/dL


 


Hct   (39.0-53.0)  %


 


MCH   (25.0-35.0)  pg


 


MCHC   (31.0-37.0)  g/dL


 


RDW   (11.5-15.5)  %


 


Plt Count   (150-450)  k/uL


 


Neutrophils #   (1.3-7.7)  k/uL


 


Lymphocytes #   (1.0-4.8)  k/uL


 


ABG pH   (7.35-7.45)  


 


ABG pCO2   (35-45)  mmHg


 


ABG pO2   ()  mmHg


 


ABG HCO3   (21-25)  mmol/L


 


ABG Total CO2   (19-24)  mmol/L


 


ABG O2 Saturation   (94-97)  %


 


Sodium   (137-145)  mmol/L


 


Chloride   ()  mmol/L


 


Carbon Dioxide   (22-30)  mmol/L


 


Creatinine   (0.66-1.25)  mg/dL


 


Glucose   (74-99)  mg/dL


 


POC Glucose (mg/dL)  195 H  ()  mg/dL


 


Calcium   (8.4-10.2)  mg/dL








                      Microbiology - Last 24 Hours (Table)











 12/08/22 17:57 Gram Stain - Preliminary





 Foot - Right Tissue Culture - Preliminary





    Staphylococcus aureus





    Gram Neg Bacilli














Assessment and Plan


Assessment: 





Acute onset congestive heart failure.


None STEMI with elevated troponin


Moderate aortic stenosis


Lung cancer with multiple nodular pulmonary metastasis throughout both lung 

fields


Chronic left shoulder pain and swelling of the left upper extremity related to 

his history of cancer


Right foot infection with staph


Acute on chronic hypoxic respiratory failure


Right foot wound


Lung cancer


Diabetes mellitus


Hypertension





Plan: 





Continue with, switched to oral dose on 12/7 six


Strict I's and O's


Daily weights.


Monitor renal functions


Change antibiotics into doxycycline and follow-up wound culture


Consult cardiology.


Pulmonary team consult


Consult hematology oncology.


Infectious disease consult


Consult wound care


Labs and medication were reviewed..  Continue same treatment.  Continue with 

symptomatic treatment.  Resume home medication.  Monitor lytes and vitals.  DVT 

and GI prophylaxis.  Further recommendations as per clinical course of the 

patient


DVT prophylaxis: Subcutaneous heparin


GI Prophylaxis: Pepcid


Prognosis is guarded 


plan discussed with patient and family including wife and daughter at bedside 

and all their questions answered and they're agreeable with the current plan

## 2022-12-12 LAB
ANION GAP SERPL CALC-SCNC: 2 MMOL/L
BASOPHILS # BLD AUTO: 0 K/UL (ref 0–0.2)
BASOPHILS NFR BLD AUTO: 0 %
BUN SERPL-SCNC: 19 MG/DL (ref 9–20)
CALCIUM SPEC-MCNC: 8 MG/DL (ref 8.4–10.2)
CHLORIDE SERPL-SCNC: 90 MMOL/L (ref 98–107)
CO2 SERPL-SCNC: 42 MMOL/L (ref 22–30)
EOSINOPHIL # BLD AUTO: 0 K/UL (ref 0–0.7)
EOSINOPHIL NFR BLD AUTO: 0 %
ERYTHROCYTE [DISTWIDTH] IN BLOOD BY AUTOMATED COUNT: 3.81 M/UL (ref 4.3–5.9)
ERYTHROCYTE [DISTWIDTH] IN BLOOD: 16.4 % (ref 11.5–15.5)
GLUCOSE BLD-MCNC: 146 MG/DL (ref 70–110)
GLUCOSE BLD-MCNC: 187 MG/DL (ref 70–110)
GLUCOSE BLD-MCNC: 207 MG/DL (ref 70–110)
GLUCOSE BLD-MCNC: 234 MG/DL (ref 70–110)
GLUCOSE SERPL-MCNC: 139 MG/DL (ref 74–99)
HCT VFR BLD AUTO: 32.1 % (ref 39–53)
HGB BLD-MCNC: 9.1 GM/DL (ref 13–17.5)
LYMPHOCYTES # SPEC AUTO: 0.9 K/UL (ref 1–4.8)
LYMPHOCYTES NFR SPEC AUTO: 8 %
MCH RBC QN AUTO: 23.8 PG (ref 25–35)
MCHC RBC AUTO-ENTMCNC: 28.2 G/DL (ref 31–37)
MCV RBC AUTO: 84.3 FL (ref 80–100)
MONOCYTES # BLD AUTO: 0.4 K/UL (ref 0–1)
MONOCYTES NFR BLD AUTO: 4 %
NEUTROPHILS # BLD AUTO: 9.9 K/UL (ref 1.3–7.7)
NEUTROPHILS NFR BLD AUTO: 86 %
PLATELET # BLD AUTO: 401 K/UL (ref 150–450)
POTASSIUM SERPL-SCNC: 3.7 MMOL/L (ref 3.5–5.1)
SODIUM SERPL-SCNC: 134 MMOL/L (ref 137–145)
WBC # BLD AUTO: 11.5 K/UL (ref 3.8–10.6)

## 2022-12-12 RX ADMIN — PREGABALIN SCH MG: 100 CAPSULE ORAL at 09:37

## 2022-12-12 RX ADMIN — IPRATROPIUM BROMIDE AND ALBUTEROL SULFATE SCH ML: .5; 3 SOLUTION RESPIRATORY (INHALATION) at 11:48

## 2022-12-12 RX ADMIN — INSULIN ASPART SCH: 100 INJECTION, SOLUTION INTRAVENOUS; SUBCUTANEOUS at 06:25

## 2022-12-12 RX ADMIN — MORPHINE SULFATE SCH MG: 30 TABLET, FILM COATED, EXTENDED RELEASE ORAL at 09:37

## 2022-12-12 RX ADMIN — HEPARIN SODIUM SCH UNIT: 5000 INJECTION INTRAVENOUS; SUBCUTANEOUS at 23:18

## 2022-12-12 RX ADMIN — AMPICILLIN SODIUM AND SULBACTAM SODIUM SCH MLS/HR: 2; 1 INJECTION, POWDER, FOR SOLUTION INTRAMUSCULAR; INTRAVENOUS at 06:29

## 2022-12-12 RX ADMIN — INSULIN ASPART SCH UNIT: 100 INJECTION, SOLUTION INTRAVENOUS; SUBCUTANEOUS at 20:53

## 2022-12-12 RX ADMIN — Medication SCH MG: at 09:38

## 2022-12-12 RX ADMIN — INSULIN ASPART SCH UNIT: 100 INJECTION, SOLUTION INTRAVENOUS; SUBCUTANEOUS at 12:49

## 2022-12-12 RX ADMIN — LIDOCAINE SCH PATCH: 50 PATCH TOPICAL at 17:27

## 2022-12-12 RX ADMIN — AMPICILLIN SODIUM AND SULBACTAM SODIUM SCH MLS/HR: 2; 1 INJECTION, POWDER, FOR SOLUTION INTRAMUSCULAR; INTRAVENOUS at 12:49

## 2022-12-12 RX ADMIN — INSULIN ASPART SCH UNIT: 100 INJECTION, SOLUTION INTRAVENOUS; SUBCUTANEOUS at 17:30

## 2022-12-12 RX ADMIN — MORPHINE SULFATE SCH MG: 30 TABLET, FILM COATED, EXTENDED RELEASE ORAL at 20:52

## 2022-12-12 RX ADMIN — FAMOTIDINE SCH MG: 20 TABLET, FILM COATED ORAL at 09:38

## 2022-12-12 RX ADMIN — MORPHINE SULFATE SCH MG: 30 TABLET, FILM COATED, EXTENDED RELEASE ORAL at 17:28

## 2022-12-12 RX ADMIN — HEPARIN SODIUM SCH UNIT: 5000 INJECTION INTRAVENOUS; SUBCUTANEOUS at 09:38

## 2022-12-12 RX ADMIN — IPRATROPIUM BROMIDE AND ALBUTEROL SULFATE SCH ML: .5; 3 SOLUTION RESPIRATORY (INHALATION) at 19:22

## 2022-12-12 RX ADMIN — IPRATROPIUM BROMIDE AND ALBUTEROL SULFATE SCH ML: .5; 3 SOLUTION RESPIRATORY (INHALATION) at 16:10

## 2022-12-12 RX ADMIN — METHYLPREDNISOLONE SODIUM SUCCINATE SCH MG: 40 INJECTION, POWDER, FOR SOLUTION INTRAMUSCULAR; INTRAVENOUS at 20:53

## 2022-12-12 RX ADMIN — FAMOTIDINE SCH MG: 20 TABLET, FILM COATED ORAL at 20:53

## 2022-12-12 RX ADMIN — PREGABALIN SCH MG: 100 CAPSULE ORAL at 20:52

## 2022-12-12 RX ADMIN — FUROSEMIDE SCH MG: 40 TABLET ORAL at 17:29

## 2022-12-12 RX ADMIN — LORATADINE SCH MG: 10 TABLET ORAL at 09:38

## 2022-12-12 RX ADMIN — AMPICILLIN SODIUM AND SULBACTAM SODIUM SCH MLS/HR: 2; 1 INJECTION, POWDER, FOR SOLUTION INTRAMUSCULAR; INTRAVENOUS at 23:18

## 2022-12-12 RX ADMIN — HEPARIN SODIUM SCH UNIT: 5000 INJECTION INTRAVENOUS; SUBCUTANEOUS at 17:27

## 2022-12-12 RX ADMIN — SENNOSIDES PRN MG: 8.6 TABLET, FILM COATED ORAL at 09:40

## 2022-12-12 RX ADMIN — AMPICILLIN SODIUM AND SULBACTAM SODIUM SCH MLS/HR: 2; 1 INJECTION, POWDER, FOR SOLUTION INTRAMUSCULAR; INTRAVENOUS at 17:29

## 2022-12-12 RX ADMIN — METHYLPREDNISOLONE SODIUM SUCCINATE SCH MG: 40 INJECTION, POWDER, FOR SOLUTION INTRAMUSCULAR; INTRAVENOUS at 09:37

## 2022-12-12 RX ADMIN — METOPROLOL TARTRATE SCH MG: 25 TABLET, FILM COATED ORAL at 09:37

## 2022-12-12 RX ADMIN — METOPROLOL TARTRATE SCH MG: 25 TABLET, FILM COATED ORAL at 20:52

## 2022-12-12 RX ADMIN — DOCUSATE SODIUM SCH MG: 100 CAPSULE, LIQUID FILLED ORAL at 09:37

## 2022-12-12 RX ADMIN — HYDROCODONE BITARTRATE AND ACETAMINOPHEN PRN EACH: 5; 325 TABLET ORAL at 06:27

## 2022-12-12 RX ADMIN — DOCUSATE SODIUM SCH MG: 100 CAPSULE, LIQUID FILLED ORAL at 20:53

## 2022-12-12 RX ADMIN — FUROSEMIDE SCH MG: 40 TABLET ORAL at 09:38

## 2022-12-12 RX ADMIN — IPRATROPIUM BROMIDE AND ALBUTEROL SULFATE SCH ML: .5; 3 SOLUTION RESPIRATORY (INHALATION) at 07:59

## 2022-12-12 RX ADMIN — COLLAGENASE SANTYL SCH APPLIC: 250 OINTMENT TOPICAL at 17:29

## 2022-12-13 LAB
GLUCOSE BLD-MCNC: 160 MG/DL (ref 70–110)
GLUCOSE BLD-MCNC: 190 MG/DL (ref 70–110)
GLUCOSE BLD-MCNC: 198 MG/DL (ref 70–110)
GLUCOSE BLD-MCNC: 234 MG/DL (ref 70–110)

## 2022-12-13 RX ADMIN — DOCUSATE SODIUM SCH MG: 100 CAPSULE, LIQUID FILLED ORAL at 21:32

## 2022-12-13 RX ADMIN — FAMOTIDINE SCH MG: 20 TABLET, FILM COATED ORAL at 21:33

## 2022-12-13 RX ADMIN — Medication SCH MG: at 10:27

## 2022-12-13 RX ADMIN — LIDOCAINE SCH PATCH: 50 PATCH TOPICAL at 12:58

## 2022-12-13 RX ADMIN — FAMOTIDINE SCH MG: 20 TABLET, FILM COATED ORAL at 10:27

## 2022-12-13 RX ADMIN — AMPICILLIN SODIUM AND SULBACTAM SODIUM SCH MLS/HR: 2; 1 INJECTION, POWDER, FOR SOLUTION INTRAMUSCULAR; INTRAVENOUS at 17:17

## 2022-12-13 RX ADMIN — IPRATROPIUM BROMIDE AND ALBUTEROL SULFATE SCH ML: .5; 3 SOLUTION RESPIRATORY (INHALATION) at 15:40

## 2022-12-13 RX ADMIN — INSULIN ASPART SCH UNIT: 100 INJECTION, SOLUTION INTRAVENOUS; SUBCUTANEOUS at 06:27

## 2022-12-13 RX ADMIN — INSULIN ASPART SCH UNIT: 100 INJECTION, SOLUTION INTRAVENOUS; SUBCUTANEOUS at 17:18

## 2022-12-13 RX ADMIN — DOCUSATE SODIUM SCH MG: 100 CAPSULE, LIQUID FILLED ORAL at 10:26

## 2022-12-13 RX ADMIN — METOPROLOL TARTRATE SCH MG: 25 TABLET, FILM COATED ORAL at 21:33

## 2022-12-13 RX ADMIN — HYDROCODONE BITARTRATE AND ACETAMINOPHEN PRN EACH: 5; 325 TABLET ORAL at 06:26

## 2022-12-13 RX ADMIN — AMPICILLIN SODIUM AND SULBACTAM SODIUM SCH MLS/HR: 2; 1 INJECTION, POWDER, FOR SOLUTION INTRAMUSCULAR; INTRAVENOUS at 06:28

## 2022-12-13 RX ADMIN — PREGABALIN SCH MG: 100 CAPSULE ORAL at 21:32

## 2022-12-13 RX ADMIN — INSULIN ASPART SCH UNIT: 100 INJECTION, SOLUTION INTRAVENOUS; SUBCUTANEOUS at 21:34

## 2022-12-13 RX ADMIN — IPRATROPIUM BROMIDE AND ALBUTEROL SULFATE SCH ML: .5; 3 SOLUTION RESPIRATORY (INHALATION) at 20:16

## 2022-12-13 RX ADMIN — COLLAGENASE SANTYL SCH APPLIC: 250 OINTMENT TOPICAL at 17:19

## 2022-12-13 RX ADMIN — PREGABALIN SCH MG: 100 CAPSULE ORAL at 10:31

## 2022-12-13 RX ADMIN — MORPHINE SULFATE SCH MG: 30 TABLET, FILM COATED, EXTENDED RELEASE ORAL at 21:32

## 2022-12-13 RX ADMIN — METHYLPREDNISOLONE SODIUM SUCCINATE SCH MG: 40 INJECTION, POWDER, FOR SOLUTION INTRAMUSCULAR; INTRAVENOUS at 10:32

## 2022-12-13 RX ADMIN — METOPROLOL TARTRATE SCH MG: 25 TABLET, FILM COATED ORAL at 10:27

## 2022-12-13 RX ADMIN — IPRATROPIUM BROMIDE AND ALBUTEROL SULFATE SCH ML: .5; 3 SOLUTION RESPIRATORY (INHALATION) at 08:12

## 2022-12-13 RX ADMIN — METHYLPREDNISOLONE SODIUM SUCCINATE SCH MG: 40 INJECTION, POWDER, FOR SOLUTION INTRAMUSCULAR; INTRAVENOUS at 21:34

## 2022-12-13 RX ADMIN — IPRATROPIUM BROMIDE AND ALBUTEROL SULFATE SCH ML: .5; 3 SOLUTION RESPIRATORY (INHALATION) at 12:09

## 2022-12-13 RX ADMIN — MORPHINE SULFATE SCH MG: 30 TABLET, FILM COATED, EXTENDED RELEASE ORAL at 17:17

## 2022-12-13 RX ADMIN — INSULIN ASPART SCH UNIT: 100 INJECTION, SOLUTION INTRAVENOUS; SUBCUTANEOUS at 12:57

## 2022-12-13 RX ADMIN — CEFEPIME HYDROCHLORIDE SCH MLS/HR: 2 INJECTION, POWDER, FOR SOLUTION INTRAVENOUS at 23:34

## 2022-12-13 RX ADMIN — FUROSEMIDE SCH MG: 40 TABLET ORAL at 10:26

## 2022-12-13 RX ADMIN — MORPHINE SULFATE SCH MG: 30 TABLET, FILM COATED, EXTENDED RELEASE ORAL at 10:27

## 2022-12-13 RX ADMIN — LORATADINE SCH MG: 10 TABLET ORAL at 10:27

## 2022-12-13 RX ADMIN — FUROSEMIDE SCH MG: 40 TABLET ORAL at 17:17

## 2022-12-13 RX ADMIN — HEPARIN SODIUM SCH UNIT: 5000 INJECTION INTRAVENOUS; SUBCUTANEOUS at 10:28

## 2022-12-13 RX ADMIN — AMPICILLIN SODIUM AND SULBACTAM SODIUM SCH MLS/HR: 2; 1 INJECTION, POWDER, FOR SOLUTION INTRAMUSCULAR; INTRAVENOUS at 12:57

## 2022-12-13 RX ADMIN — HEPARIN SODIUM SCH UNIT: 5000 INJECTION INTRAVENOUS; SUBCUTANEOUS at 17:17

## 2022-12-13 NOTE — P.PN
Subjective


Progress Note Date: 12/13/22


Principal diagnosis: 


Right diabetic foot infection





Patient is a 62-year-old  male with a past medical history taken for 

diabetes mellitus presenting to the hospital 4 days ago on 12/3/2022 for 

evaluation of increasing shortness of breath and lower extremity swelling ladan

ent also have wound on the plantar aspect of the right foot which has been there

for for more than a week before presentation to the hospital patient did have 

local cultures obtained growing MSSA and anaerobes.  Patient is status post 

bedside debridement of his right foot plantar wound by vascular surgery mention 

wound was extending on into the subcutaneous tissue no involvement of the bone, 

procedure completed on 12/08/2022


On today's evaluation that is 12/13/2022, the patient denies any fever or 

chills, the patient is breathing slightly comfortably on a 2 L nasal cannula, 

the patient denies  chest pain, the patient did have occasional dry cough no 

nausea no vomiting no abdominal pain however the patient complaining of 

significant diarrhea started after the patient was given the visiting for his 

constipation associated with his narcotic use





Objective





- Vital Signs


Vital signs: 


                                   Vital Signs











Temp  98.5 F   12/13/22 04:00


 


Pulse  90   12/13/22 12:20


 


Resp  16   12/13/22 12:00


 


BP  100/59   12/13/22 12:00


 


Pulse Ox  95   12/13/22 12:00


 


FiO2      








                                 Intake & Output











 12/12/22 12/13/22 12/13/22





 18:59 06:59 18:59


 


Intake Total 540  358


 


Output Total 600 825 400


 


Balance -60 -825 -42


 


Weight  104 kg 104 kg


 


Intake:   


 


  Oral 540  358


 


Output:   


 


  Urine 600 825 400


 


Other:   


 


  Voiding Method Urinal Urinal Urinal





 Diaper Diaper Diaper


 


  # Voids  1 


 


  # Bowel Movements   1














- Exam


GENERAL DESCRIPTION: An elderly male lying in bed in no distress





RESPIRATORY SYSTEM: Unlabored breathing , decreased breath sounds at bases





HEART: S1 S2 regular rate and rhythm ,





ABDOMEN: Soft , no tenderness





EXTREMITIES: Right foot plantar wound is currently dressed, no drainage on the 

dressing








- Labs


CBC & Chem 7: 


                                 12/12/22 07:29





                                 12/12/22 07:29


Labs: 


                  Abnormal Lab Results - Last 24 Hours (Table)











  12/12/22 12/12/22 12/13/22 Range/Units





  16:24 20:12 05:57 


 


POC Glucose (mg/dL)  187 H  234 H  198 H  ()  mg/dL














  12/13/22 Range/Units





  11:25 


 


POC Glucose (mg/dL)  234 H  ()  mg/dL














Assessment and Plan


(1) Diabetic foot infection


Current Visit: Yes   Status: Acute   Code(s): E11.628 - TYPE 2 DIABETES MELLITUS

WITH OTHER SKIN COMPLICATIONS; L08.9 - LOCAL INFECTION OF THE SKIN AND 

SUBCUTANEOUS TISSUE, UNSP   SNOMED Code(s): 609882648


   





(2) Wound of foot


Current Visit: Yes   Status: Acute   Code(s): S91.309A - UNSPECIFIED OPEN WOUND,

UNSPECIFIED FOOT, INITIAL ENCOUNTER   SNOMED Code(s): 089580994


   


Plan: 


1patient with right diabetic foot wound with secondary infection cultures are 

growing MSSA and anaerobes patient did have a wound with slough tissue and 

concern for underlying deep infection.


2patient is status post vascular surgery evaluation and debridement of the 

right foot plantar wound along with deep culture growing MSSA and Enterobacter 

along with anaerobes


3patient bone scan was suspicious for osteomyelitis antibiotic will be switched

over to cefepime and Flagyl PICC line for outpatient IV antibiotic therapy


Time with Patient: Less than 30

## 2022-12-13 NOTE — P.PN
Subjective


Progress Note Date: 12/12/22


Principal diagnosis: 


Right diabetic foot infection





Patient is a 62-year-old  male with a past medical history taken for 

diabetes mellitus presenting to the hospital 4 days ago on 12/3/2022 for 

evaluation of increasing shortness of breath and lower extremity swelling ladan

ent also have wound on the plantar aspect of the right foot which has been there

for for more than a week before presentation to the hospital patient did have 

local cultures obtained growing MSSA and anaerobes.  Patient is status post 

bedside debridement of his right foot plantar wound by vascular surgery mention 

wound was extending on into the subcutaneous tissue no involvement of the bone, 

procedure completed on 12/08/2022


On today's evaluation that is 12/12/2022, the patient continues to be afebrile, 

the patient is breathing slightly comfortably on a 2 L nasal cannula, the 

patient denies  chest pain, the patient did have occasional dry cough no nausea 

no vomiting no abdominal pain and no diarrhea





Objective





- Vital Signs


Vital signs: 


                                   Vital Signs











Temp  98.2 F   12/12/22 04:00


 


Pulse  89   12/12/22 12:00


 


Resp  16   12/12/22 12:00


 


BP  112/64   12/12/22 12:00


 


Pulse Ox  92 L  12/12/22 12:00


 


FiO2      








                                 Intake & Output











 12/11/22 12/12/22 12/12/22





 18:59 06:59 18:59


 


Intake Total 118  180


 


Output Total 500 300 225


 


Balance -382 -300 -45


 


Weight  103.5 kg 


 


Intake:   


 


  Oral 118  180


 


Output:   


 


  Urine 500 300 225


 


Other:   


 


  Voiding Method Urinal Urinal Urinal





 Diaper Diaper Diaper














- Exam


GENERAL DESCRIPTION: An elderly male lying in bed in no distress





RESPIRATORY SYSTEM: Unlabored breathing , decreased breath sounds at bases





HEART: S1 S2 regular rate and rhythm ,





ABDOMEN: Soft , no tenderness





EXTREMITIES: Right foot plantar wound is currently dressed, no drainage on the 

dressing








- Labs


CBC & Chem 7: 


                                 12/12/22 07:29





                                 12/12/22 07:29


Labs: 


                  Abnormal Lab Results - Last 24 Hours (Table)











  12/11/22 12/11/22 12/12/22 Range/Units





  16:34 19:58 06:00 


 


WBC     (3.8-10.6)  k/uL


 


RBC     (4.30-5.90)  m/uL


 


Hgb     (13.0-17.5)  gm/dL


 


Hct     (39.0-53.0)  %


 


MCH     (25.0-35.0)  pg


 


MCHC     (31.0-37.0)  g/dL


 


RDW     (11.5-15.5)  %


 


Neutrophils #     (1.3-7.7)  k/uL


 


Lymphocytes #     (1.0-4.8)  k/uL


 


Sodium     (137-145)  mmol/L


 


Chloride     ()  mmol/L


 


Carbon Dioxide     (22-30)  mmol/L


 


Creatinine     (0.66-1.25)  mg/dL


 


Glucose     (74-99)  mg/dL


 


POC Glucose (mg/dL)  197 H  133 H  146 H  ()  mg/dL


 


Calcium     (8.4-10.2)  mg/dL














  12/12/22 12/12/22 12/12/22 Range/Units





  07:29 07:29 11:26 


 


WBC  11.5 H    (3.8-10.6)  k/uL


 


RBC  3.81 L    (4.30-5.90)  m/uL


 


Hgb  9.1 L    (13.0-17.5)  gm/dL


 


Hct  32.1 L    (39.0-53.0)  %


 


MCH  23.8 L    (25.0-35.0)  pg


 


MCHC  28.2 L    (31.0-37.0)  g/dL


 


RDW  16.4 H    (11.5-15.5)  %


 


Neutrophils #  9.9 H    (1.3-7.7)  k/uL


 


Lymphocytes #  0.9 L    (1.0-4.8)  k/uL


 


Sodium   134 L   (137-145)  mmol/L


 


Chloride   90 L   ()  mmol/L


 


Carbon Dioxide   42 H*   (22-30)  mmol/L


 


Creatinine   0.51 L   (0.66-1.25)  mg/dL


 


Glucose   139 H   (74-99)  mg/dL


 


POC Glucose (mg/dL)    207 H  ()  mg/dL


 


Calcium   8.0 L   (8.4-10.2)  mg/dL








                      Microbiology - Last 24 Hours (Table)











 12/08/22 17:57 Gram Stain - Final





 Foot - Right Tissue Culture - Final





    Staphylococcus aureus





    Enterobacter cloacae














Assessment and Plan


(1) Diabetic foot infection


Current Visit: Yes   Status: Acute   Code(s): E11.628 - TYPE 2 DIABETES MELLITUS

WITH OTHER SKIN COMPLICATIONS; L08.9 - LOCAL INFECTION OF THE SKIN AND 

SUBCUTANEOUS TISSUE, UNSP   SNOMED Code(s): 049019986


   





(2) Wound of foot


Current Visit: Yes   Status: Acute   Code(s): S91.309A - UNSPECIFIED OPEN WOUND,

UNSPECIFIED FOOT, INITIAL ENCOUNTER   SNOMED Code(s): 257758621


   


Plan: 


1patient with right diabetic foot wound with secondary infection cultures are 

growing MSSA and anaerobes patient did have a wound with slough tissue and 

concern for underlying deep infection.


2patient is status post vascular surgery evaluation and debridement of the 

right foot plantar wound along with deep culture growing MSSA and gram-negative 

anaerobes


3patient is currently waiting for the bone scan to be completed to make sure no

evidence of any deep infection such as osteomyelitis patient will continue with 

Unasyn  


Time with Patient: Less than 30

## 2022-12-13 NOTE — NM
EXAMINATION TYPE: NM bone 3 phase

 

DATE OF EXAM: 12/13/2022

 

COMPARISON: X-ray 12/3/2022

 

HISTORY: Right foot ulcer

 

Triple phase bone scintigraphy was performed following the injection of 23.4 mCi Tc 99m MDP.  Immedia
te images and 5.75 hours post injection images acquired.

 

FINDINGS: 

 

There is increased flow to the right heel and foot.

There is increased soft tissue uptake involving the plantar surface of the right foot and heel

There is increased focal uptake involving the distal fifth metatarsal.

 

IMPRESSION:

1. Constellation of findings suspicious for osteomyelitis distal fifth metatarsal

## 2022-12-13 NOTE — P.PN
Subjective


Progress Note Date: 12/13/22


Principal diagnosis: 





Right diabetic foot infection


Acute exacerbation CHF


NSTEMI


Metastatic lung cancer





62-year-old gentleman with past medical history significant for lung cancer on 

immunotherapy, congestive heart Failure, COPD, asthma who presented to the ER 

because of worsening shortness of breath and swelling of legs.  Patient has been

noticing increasing shortness of breath on exertion for the last couple of days 

associated with increased swelling of legs.  Patient was on diuretics at home 

but stated that it was not working and he continues to be short of breath.  

Patient also currently on antibiotics for right foot wound.  Because of this w

orsening shortness of breath and swelling of legs patient came to the ER.  

Initial lab work showed patient to have a white count of 12.9, hemoglobin of 

8.8, platelet count of 628, sodium 137, potassium 4.3, carbon dioxide 38, 

troponin 0.1.  CTA chest done showed no evidence of pulmonary embolism.  It did 

show pleural thickening and destructive changes in the left upper ribs.  Large 

mass involving the upper limits of chest wall was also seen.  Patient was 

admitted to hospitalist service for further evaluation and treatment





12/10/2022


Patient is seen and evaluated with family at bedside; denies any fever or 

chills, the patient is breathing slightly comfortably on a 2 L nasal cannula, 

the patient denies having any chest pain occasional cough no nausea no vomiting 

no abdominal pain and denies having any pain to the right foot plantar wound 

area


patient with right diabetic foot wound with secondary infection cultures are 

growing MSSA and anaerobes patient did have a wound with slough tissue and 

concern for underlying deep infection.


patient is status post vascular surgery evaluation and debridement of the right

foot plantar wound along with deep culture which are currently growing staph 

aureus


patient to continue with Unasyn 3 g per 6-hour while inpatient and hopefully 

transition to oral antibodies on discharge





12/11/2022


Patient is seen and evaluated; breathing slightly comfortably on a 2 L nasal 

cannula, the patient denies  chest pain, the patient did have occasional cough 

no nausea no vomiting no abdominal pain and the patient denies having any pain 

to the right foot plantar wound area


Labs are reviewed and stable with WBC of 2.0, hemoglobin 8.7 and hematocrit of 

31.5 with platelet count of 471, sodium 133, potassium 4.3, BUN/creatinine of 

18/0.48 and blood was of 140


patient with right diabetic foot wound with secondary infection cultures are 

growing MSSA and anaerobes patient did have a wound with slough tissue and 

concern for underlying deep infection.


patient is status post vascular surgery evaluation and debridement of the right

foot plantar wound along with deep culture growing MSSA and gram-negative with 

medicines due to spending


patient seemed to have shown some clinical improvement as for his right foot 

wound is concerned and will continue with Unasyn 3 g per 6-hour adjusting it 

further on the basis of culture








12/12/2022


Patient is evaluated today sitting up at the bedside with family.  He is 

comfortable on 2 L of nasal cannula, he denies chest pain.  He has occasional 

cough.  He does report a bowel movement for the last 7 days.  He is receiving 

Colace and Senokot was added as well as a Dulcolax suppository 1 today.  He 

does have soft abdomen with normoactive bowel sounds.  White count 11.5 today, 

sodium 134.  Continues on IV Unasyn with further recommendations for DC from 

infectious disease. Continues on IV solumedrol, oral lasix. Pain management with

MS contin. Lower extremity edema has improved.





12/13/2022


Patient sitting up at bedside today. No acute events overnight. Will complete 

second portion of bone scan to right foot later today. Continues on IV unasyn. 

Blood glucose in the 200s insulin has been increased today. Remains afebrile. 





Review of Systems





Constitutional: Denied any fatigue denied any fever.


Cardio vascular: denied any chest pain, palpitations


Gastrointestinal: denied any nausea, vomiting, diarrhea


Pulmonary: Denied any shortness of breath Reports cough


Neurologic denied any new focal deficits





All inpatient medications were reviewed and appropriate changes in these 

medications as dictated in the interval history and assessment and plan.





PHYSICAL EXAMINATION: 





GENERAL: The patient is alert and oriented x3, not in any acute distress. on 2 L

nasal cannula. Well developed, well nourished. 


HEENT: Pupils are round and equally reacting to light. EOMI. No scleral icterus.

No conjunctival pallor. Normocephalic, atraumatic. No pharyngeal erythema. No 

thyromegaly. 


CARDIOVASCULAR: S1 and S2 present. No murmurs, rubs, or gallops. 


PULMONARY: Chest is clear to auscultation, no wheezing or crackles. 


ABDOMEN: Soft, nontender, nondistended, normoactive bowel sounds. No palpable 

organomegaly. 


MUSCULOSKELETAL: No joint swelling or deformity.


EXTREMITIES: No cyanosis, clubbing, or pedal edema. Dressing intact to right 

foot stump.


NEUROLOGICAL: Gross neurological examination did not reveal any focal deficits. 


SKIN: No rashes. 





Assessment plan


Assessment


-Right diabetic foot wound status post debridement and deep tissue culture rule 

out osteomyelitis


Acute on chronic hypoxic respiratory failure related to a combination of 

diastolic heart failure and COPD exacerbation


Acute onset congestive heart failure diastolic dysfunction, improved


Acute COPD exacerbation


Moderate aortic stenosis 


Lung mass with invasion to chest wall destruction involving the ribs


Chronic left shoulder pain and left upper extremity lymphedema 


Metastatic stage IV lung cancer


Diabetes Mellitus


Hypertension 


GI prophylaxis


DVT prophylaxis SubCu heparin 


Full Code 





Plan 


Continue on oral Lasix with strict intake and output


Monitor weights


Continue antibiotics in the form of IV unasyn


Patient to undergo bone scan to right foot today. 


Continue oxygen via nasal cannula 2 to 3 liters 


Pain management 


Continue bowel regimen. 


Continue local wound care with santyl cream daily per vascular services to right

foot wound


[place] on discharge 





The impression and plan of care has been dictated by Annamaria Pratt, Nurse 

Practitioner as directed.





Dr. Damaris MD


I have performed a history and physical examination and medical decision making 

of this patient, discussed the same with the dictator, and agree with the 

dictators assessment and plan as written, documented as a scribe. Based on total

visit time, I have performed more than 50% of this visit. 








Objective





- Vital Signs


Vital signs: 


                                   Vital Signs











Temp  98.5 F   12/13/22 04:00


 


Pulse  90   12/13/22 12:20


 


Resp  16   12/13/22 12:00


 


BP  100/59   12/13/22 12:00


 


Pulse Ox  95   12/13/22 12:00


 


FiO2      








                                 Intake & Output











 12/12/22 12/13/22 12/13/22





 18:59 06:59 18:59


 


Intake Total 540  358


 


Output Total 600 825 400


 


Balance -60 -825 -42


 


Weight  104 kg 104 kg


 


Intake:   


 


  Oral 540  358


 


Output:   


 


  Urine 600 825 400


 


Other:   


 


  Voiding Method Urinal Urinal Urinal





 Diaper Diaper Diaper


 


  # Voids  1 


 


  # Bowel Movements   1














- Labs


CBC & Chem 7: 


                                 12/12/22 07:29





                                 12/12/22 07:29


Labs: 


                  Abnormal Lab Results - Last 24 Hours (Table)











  12/12/22 12/12/22 12/13/22 Range/Units





  16:24 20:12 05:57 


 


POC Glucose (mg/dL)  187 H  234 H  198 H  ()  mg/dL














  12/13/22 Range/Units





  11:25 


 


POC Glucose (mg/dL)  234 H  ()  mg/dL














Assessment and Plan


Time with Patient: Less than 30

## 2022-12-14 VITALS — TEMPERATURE: 97.8 F

## 2022-12-14 VITALS — SYSTOLIC BLOOD PRESSURE: 109 MMHG | DIASTOLIC BLOOD PRESSURE: 72 MMHG

## 2022-12-14 VITALS — RESPIRATION RATE: 18 BRPM

## 2022-12-14 VITALS — HEART RATE: 82 BPM

## 2022-12-14 LAB
ANION GAP SERPL CALC-SCNC: 4 MMOL/L
BASOPHILS # BLD AUTO: 0 K/UL (ref 0–0.2)
BASOPHILS NFR BLD AUTO: 0 %
BUN SERPL-SCNC: 23 MG/DL (ref 9–20)
CALCIUM SPEC-MCNC: 8.6 MG/DL (ref 8.4–10.2)
CHLORIDE SERPL-SCNC: 91 MMOL/L (ref 98–107)
CO2 SERPL-SCNC: 40 MMOL/L (ref 22–30)
EOSINOPHIL # BLD AUTO: 0 K/UL (ref 0–0.7)
EOSINOPHIL NFR BLD AUTO: 0 %
ERYTHROCYTE [DISTWIDTH] IN BLOOD BY AUTOMATED COUNT: 4.04 M/UL (ref 4.3–5.9)
ERYTHROCYTE [DISTWIDTH] IN BLOOD: 16.4 % (ref 11.5–15.5)
GLUCOSE BLD-MCNC: 116 MG/DL (ref 70–110)
GLUCOSE BLD-MCNC: 95 MG/DL (ref 70–110)
GLUCOSE BLD-MCNC: 95 MG/DL (ref 70–110)
GLUCOSE SERPL-MCNC: 99 MG/DL (ref 74–99)
HCT VFR BLD AUTO: 34.1 % (ref 39–53)
HGB BLD-MCNC: 9.7 GM/DL (ref 13–17.5)
LYMPHOCYTES # SPEC AUTO: 1.1 K/UL (ref 1–4.8)
LYMPHOCYTES NFR SPEC AUTO: 7 %
MCH RBC QN AUTO: 24.1 PG (ref 25–35)
MCHC RBC AUTO-ENTMCNC: 28.6 G/DL (ref 31–37)
MCV RBC AUTO: 84.3 FL (ref 80–100)
MONOCYTES # BLD AUTO: 0.6 K/UL (ref 0–1)
MONOCYTES NFR BLD AUTO: 4 %
NEUTROPHILS # BLD AUTO: 14.2 K/UL (ref 1.3–7.7)
NEUTROPHILS NFR BLD AUTO: 88 %
PLATELET # BLD AUTO: 544 K/UL (ref 150–450)
POTASSIUM SERPL-SCNC: 4.6 MMOL/L (ref 3.5–5.1)
SODIUM SERPL-SCNC: 135 MMOL/L (ref 137–145)
WBC # BLD AUTO: 16.1 K/UL (ref 3.8–10.6)

## 2022-12-14 PROCEDURE — 02HV33Z INSERTION OF INFUSION DEVICE INTO SUPERIOR VENA CAVA, PERCUTANEOUS APPROACH: ICD-10-PCS

## 2022-12-14 RX ADMIN — MORPHINE SULFATE SCH MG: 30 TABLET, FILM COATED, EXTENDED RELEASE ORAL at 16:09

## 2022-12-14 RX ADMIN — LORATADINE SCH MG: 10 TABLET ORAL at 09:16

## 2022-12-14 RX ADMIN — HEPARIN SODIUM SCH UNIT: 5000 INJECTION INTRAVENOUS; SUBCUTANEOUS at 16:10

## 2022-12-14 RX ADMIN — INSULIN ASPART SCH UNIT: 100 INJECTION, SOLUTION INTRAVENOUS; SUBCUTANEOUS at 07:03

## 2022-12-14 RX ADMIN — LIDOCAINE SCH PATCH: 50 PATCH TOPICAL at 16:10

## 2022-12-14 RX ADMIN — FAMOTIDINE SCH MG: 20 TABLET, FILM COATED ORAL at 09:17

## 2022-12-14 RX ADMIN — INSULIN ASPART SCH: 100 INJECTION, SOLUTION INTRAVENOUS; SUBCUTANEOUS at 06:23

## 2022-12-14 RX ADMIN — DOCUSATE SODIUM SCH MG: 100 CAPSULE, LIQUID FILLED ORAL at 09:16

## 2022-12-14 RX ADMIN — METHYLPREDNISOLONE SODIUM SUCCINATE SCH MG: 40 INJECTION, POWDER, FOR SOLUTION INTRAMUSCULAR; INTRAVENOUS at 09:17

## 2022-12-14 RX ADMIN — INSULIN ASPART SCH: 100 INJECTION, SOLUTION INTRAVENOUS; SUBCUTANEOUS at 14:58

## 2022-12-14 RX ADMIN — INSULIN ASPART SCH: 100 INJECTION, SOLUTION INTRAVENOUS; SUBCUTANEOUS at 18:04

## 2022-12-14 RX ADMIN — IPRATROPIUM BROMIDE AND ALBUTEROL SULFATE SCH ML: .5; 3 SOLUTION RESPIRATORY (INHALATION) at 09:03

## 2022-12-14 RX ADMIN — COLLAGENASE SANTYL SCH APPLIC: 250 OINTMENT TOPICAL at 09:17

## 2022-12-14 RX ADMIN — FUROSEMIDE SCH MG: 40 TABLET ORAL at 16:09

## 2022-12-14 RX ADMIN — PREGABALIN SCH MG: 100 CAPSULE ORAL at 09:16

## 2022-12-14 RX ADMIN — Medication SCH MG: at 09:16

## 2022-12-14 RX ADMIN — CEFEPIME HYDROCHLORIDE SCH MLS/HR: 2 INJECTION, POWDER, FOR SOLUTION INTRAVENOUS at 07:03

## 2022-12-14 RX ADMIN — IPRATROPIUM BROMIDE AND ALBUTEROL SULFATE SCH: .5; 3 SOLUTION RESPIRATORY (INHALATION) at 15:56

## 2022-12-14 RX ADMIN — INSULIN ASPART SCH: 100 INJECTION, SOLUTION INTRAVENOUS; SUBCUTANEOUS at 18:03

## 2022-12-14 RX ADMIN — HEPARIN SODIUM SCH UNIT: 5000 INJECTION INTRAVENOUS; SUBCUTANEOUS at 00:11

## 2022-12-14 RX ADMIN — HEPARIN SODIUM SCH UNIT: 5000 INJECTION INTRAVENOUS; SUBCUTANEOUS at 09:16

## 2022-12-14 RX ADMIN — IPRATROPIUM BROMIDE AND ALBUTEROL SULFATE SCH: .5; 3 SOLUTION RESPIRATORY (INHALATION) at 11:56

## 2022-12-14 RX ADMIN — CEFEPIME HYDROCHLORIDE SCH: 2 INJECTION, POWDER, FOR SOLUTION INTRAVENOUS at 15:52

## 2022-12-14 RX ADMIN — FUROSEMIDE SCH MG: 40 TABLET ORAL at 09:16

## 2022-12-14 RX ADMIN — METOPROLOL TARTRATE SCH MG: 25 TABLET, FILM COATED ORAL at 09:16

## 2022-12-14 RX ADMIN — INSULIN ASPART SCH: 100 INJECTION, SOLUTION INTRAVENOUS; SUBCUTANEOUS at 14:59

## 2022-12-14 RX ADMIN — MORPHINE SULFATE SCH: 30 TABLET, FILM COATED, EXTENDED RELEASE ORAL at 14:59

## 2022-12-14 NOTE — XR
EXAMINATION TYPE: XR chest 2V

 

DATE OF EXAM: 12/14/2022

 

COMPARISON: 12/3/2022

 

TECHNIQUE: PA and lateral views submitted.

 

HISTORY: Difficulty breathing

 

FINDINGS:

Bilateral areas of consolidation and pleural effusion with suspicion for bilateral pulmonary nodules 
or masses. Findings of an reported by prior CT scan. Destruction of the upper left rib cage is suspec
ziyad. Heart size is normal with no pneumothorax. Interstitium is stable. Correlate for underlying COPD
. Hypertrophic and degenerative changes of the spine. Right-sided PICC line noted.

 

IMPRESSION:

1. Bilateral lower lobe infiltrate and small right pleural effusion likely superimposed on a backgrou
nd of COPD. Multiple pulmonary masses suspected as previously reported.

## 2022-12-14 NOTE — IR
PICC LINE PLACEMENT:

 

HISTORY:  Infection requiring long-term antibiotic therapy

 

PROCEDURE:  Ultrasound and fluoroscopic guidance of PICC line placement.

 

COMPLICATIONS:  None

 

ANESTHESIA:  1. 1% Lidocaine locally.

 

FINDINGS/TECHNIQUE:  The procedure was explained to the patient.  The risks, complications, benefits 
and alternatives were discussed and any questions were answered.  Informed consent was obtained.  The
 patient was placed supine on the fluoroscopic table and prepped and draped in the usual sterile fash
ion.  Utilizing a  21 gauge needle and sonographic and fluoroscopic guidance, access in the right bas
ilic vein was achieved and there is placement of a 0.018 guidewire.  The vein is patent.  A 4-F sheat
h was placed over the guidewire.  The guidewire and dilator were removed and a 4-F. PICC line was osmel
daisy through the sheath with the tip at the level of the SVC.  The sheath was removed, the catheter wa
s flushed and sutured into position.  The patient was stable throughout the procedure and remained st
able upon discharge from the Department of Radiology. 

 

The vein puncture was patent under ultrasound. A gray scale image was obtained to document patency of
 the vein punctured.

 

All elements of the maximal barrier technique were utilized.

 

FLUOROSCOPY TIME:  1.2 minutes of fluoroscopy in 1 images submitted

 

IMPRESSION: 

Successful PICC line placement under ultrasound and fluoroscopic guidance.

## 2022-12-15 NOTE — P.DS
Providers


Date of admission: 


12/03/22 22:38





Attending physician: 


Leonard Wallace MD





Consults: 





                                        





12/03/22 22:38


Consult Physician Routine 


   Consulting Provider: [facility]


   Consult Reason/Comments: nstemi, chf


   Do you want consulting provider notified?: Yes, Notify in am


Consult Physician Routine 


   Consulting Provider: Nasreen Eller


   Consult Reason/Comments: lung cancer


   Do you want consulting provider notified?: Yes, Notify in am





12/06/22 10:49


Consult Physician Routine 


   Consulting Provider: Antwon Wallace


   Consult Reason/Comments: hypoxia


   Do you want consulting provider notified?: Yes





12/07/22 17:51


Consult Physician Routine 


   Consulting Provider: Jose Ortiz


   Consult Reason/Comments: Diabetic ulcer of the right foot with positive wound

culture


   Do you want consulting provider notified?: Yes





12/07/22 22:58


Consult Physician Routine 


   Consulting Provider: Delmar Mota


   Consult Reason/Comments: right foot wound , debridemnt and deep cultures


   Do you want consulting provider notified?: Yes











Primary care physician: 


Jesica Arora





Hospital Course: 


Final Diagnosis


-Right diabetic foot wound status post debridement and deep tissue culture rule 

out osteomyelitis


Acute hypoxic respiratory failure related to a combination of diastolic heart 

failure and COPD exacerbation


Acute onset congestive heart failure diastolic dysfunction, improved


Acute COPD exacerbation


Chronic hypoxic respiratory failure with 2L home oxygen as needed 


Moderate aortic stenosis 


Lung mass with invasion to chest wall destruction involving the ribs


Chronic left shoulder pain and left upper extremity lymphedema 


Metastatic stage IV lung cancer


Diabetes Mellitus


Hypertension


Prior tobacco use  


Full Code 





Discharge Disposition 


Patient is stable for discharge to subacute rehab. He has received PICC line and

will discharge with recommendations for IV cefepime 2 gram every 8 hours and 

oral flagyl 500 mg TID for 6 weeks of therapy. Continue local wound care with 

santyl to plantar aspect of right foot. Patient to follow up with Dr. Ortiz on 

discharge and at [facility] wound clinic. He is also recommended to follow up 

with Dr Eller he has an appointment made for 12/23/22 at 1045. He has a follow 

up appointment with Dr. Arora 12/20/22 at 145 pm. Patient is discharged on 

oral lasix twice a day, prednisone taper. Toprol XL has been discontinued and 

patient started on metoprolol 25 mo po bid. HGB A1C found to be 6.1 patient did 

have elevated blood glucose this admission and was discharged on injectible 

insulin, This can be adjusted once he completes steroid taper and possible can 

discontinue insulin and follow up A1C in 3 months. Patient will also need to 

follow up with Dr. LINDA [facility]a at [facility]. 





Hospital Course 


This is a 62-year-old gentleman with past medical history significant for lung 

cancer on immunotherapy, congestive heart Failure, COPD, asthma, hypertension, 

hypertensive heart disease, who presented to the ER because of worsening 

shortness of breath and swelling of legs.  Patient has been noticing increasing 

shortness of breath on exertion for the last couple of days associated with 

increased swelling of legs.  Patient was on diuretics at home but stated that it

was not working and he continues to be short of breath.  Patient also currently 

on antibiotics for right foot wound.  Because of this worsening shortness of 

breath and swelling of legs patient came to the ER.  Initial lab work showed 

patient to have a white count of 12.9, hemoglobin of 8.8, platelet count of 628,

sodium 137, potassium 4.3, carbon dioxide 38, troponin 0.1.  CTA chest done 

showed no evidence of pulmonary embolism.  It did show pleural thickening and 

destructive changes in the left upper ribs.  Large mass involving the upper 

limits of chest wall was also seen. there are also multiple nodular masses 

bilaterally in lung field with right sided pleural effusion, effusion is 

increased since november of 2022.  Patient was admitted to hospitalist service 

for further evaluation and treatment. Cardiology was consulted for acute heart 

failure, patient had echocardiogram completed showing normal LV function and 

moderate aortic stenosis, mild mitral stenosis and was started on IV lasix. He 

was transitioned to oral lasix BID. Recommended to see cardiology on discharge. 

He was He was started on empiric antibiotics and infectious disease services 

consulted. Vascular surgery performed excisional debridement of right foot 

diabetic ulcer which cultures growing MSSA and enterobacter cloacae. Local wound

care completed with santyl daily. He underwent bone scan showing evidence of 

osteomyelitis and PICC line was placed single lumen right upper arm. He is 

discharged to subacute rehab on IV and oral antibiotics.  





12/14/2022


Patient evaluated today on medical floor. No acute events overnight. He denies 

shortness of breath, no chest pain. Received single lumen right upper arm PICC 

for discharge antibiotics. He will be discharged to subacute rehab today. Labs 

today showing white count of 16.1, hgb 9.7, sodium 135, potassium 4.6, BUN 23, 

creatinine 0.48. CRP 15.2. Infectious disease has cleared for discharge. Patient

will continue with local wound care as above and follow up with Dr. Ortiz in the

wound care clinic at [facility]. He will be discharged on oral steroid taper. 

His lungs are clear today, S1 S2 auscultated and remaining in sinus rhythm. 

Focal neurological exam is negative. Pain is controlled. Follow up appointments 

as above. He is afebrile, heart rate 82, blood pressure 109/72, 95% on 2L nasal 

cannula. 





Total time taken in discharge planning greater than 35 minutes. Please see 

medication reconciliation for a list of current medication. 





Thank you for allowing us to participate in the care of this patient.





The impression and plan of care has been dictated by Annamaria Pratt, Nurse 

Practitioner as directed.





Dr. Damaris MD


I have performed a history and physical examination and medical decision making 

of this patient, discussed the same with the dictator, and agree with the 

dictators assessment and plan as written, documented as a scribe. Based on total

visit time, I have performed more than 50% of this visit. 


Patient Condition at Discharge: Stable





Plan - Discharge Summary


New Discharge Prescriptions: 


New


   Furosemide [Lasix] 40 mg PO BID@0900,1600  tab


   Lidocaine 5% Patch [Lidoderm 5% Patch] 2 patch TOPICAL DAILY@1500  patch


   HYDROcodone/APAP 5-325MG [Norco 5-325] 1 each PO Q6HR PRN #4 tab


     PRN Reason: Pain


   INSULIN ASPART (NovoLOG) [NovoLOG (formulary)] 3 unit SQ AC-TID  each


   Famotidine [Pepcid] 20 mg PO BID  tab


   predniSONE 0 mg PO AS DIRECTED 16 Days #38 tab


   Collagenase [Santyl Ointment] 1 applic TOPICAL DAILY  each


   Docusate [Colace] 100 mg PO BID  cap


   metroNIDAZOLE [Flagyl] 500 mg PO TID  tab


   Insulin Detemir (Levemir) [Levemir] 17 unit SQ HS  each


   Metoprolol Tartrate [Lopressor] 25 mg PO BID  tab


   Cefepime [Maxipime] 2 gm IVPB Q8H  each


   INSULIN ASPART (NovoLOG) [NovoLOG (formulary)] 0 unit SQ ACHS  each


   Sennosides [Senokot] 8.6 mg PO BID PRN  tab


     PRN Reason: Constipation





Continue


   Albuterol Sulfate [Ventolin HFA] 2 puff INHALATION RT-Q4H PRN


     PRN Reason: Shortness Of Breath


   ondansetron HCL [Zofran] 8 mg PO TID PRN


     PRN Reason: Nausea And Vomiting


   Multivitamins, Thera [Multivitamin (formulary)] 1 tab PO DAILY


   Ipratropium-Albuterol Nebulize [Duoneb 0.5 mg-3 mg/3 ml Soln] 3 ml INHALATION

RT-QID PRN


     PRN Reason: Shortness Of Breath


   Loratadine [Claritin] 10 mg PO DAILY #30 tab


   Ferrous Sulfate [Iron (65 MG Elemental)] 325 mg PO DAILY


   Pregabalin [Lyrica] 200 mg PO BID #2 cap


   Morphine Sulfate [Ms Contin] 30 mg PO TID #3 tab





Discontinued


   Furosemide [Lasix] 20 mg PO DAILY PRN


     PRN Reason: Edema


   Cephalexin [Keflex] 500 mg PO QID


Discharge Medication List





Albuterol Sulfate [Ventolin HFA] 2 puff INHALATION RT-Q4H PRN 09/03/21 [History]


ondansetron HCL [Zofran] 8 mg PO TID PRN 09/17/22 [History]


Loratadine [Claritin] 10 mg PO DAILY #30 tab 09/18/22 [Rx]


Ferrous Sulfate [Iron (65 MG Elemental)] 325 mg PO DAILY 12/03/22 [History]


Ipratropium-Albuterol Nebulize [Duoneb 0.5 mg-3 mg/3 ml Soln] 3 ml INHALATION 

RT-QID PRN 12/03/22 [History]


Multivitamins, Thera [Multivitamin (formulary)] 1 tab PO DAILY 12/03/22 

[History]


Cefepime [Maxipime] 2 gm IVPB Q8H  each 12/14/22 [Rx]


Collagenase [Santyl Ointment] 1 applic TOPICAL DAILY  each 12/14/22 [Rx]


Docusate [Colace] 100 mg PO BID  cap 12/14/22 [Rx]


Famotidine [Pepcid] 20 mg PO BID  tab 12/14/22 [Rx]


Furosemide [Lasix] 40 mg PO BID@0900,1600  tab 12/14/22 [Rx]


HYDROcodone/APAP 5-325MG [Alpine 5-325] 1 each PO Q6HR PRN #4 tab 12/14/22 [Rx]


INSULIN ASPART (NovoLOG) [NovoLOG (formulary)] 0 unit SQ ACHS  each 12/14/22 

[Rx]


INSULIN ASPART (NovoLOG) [NovoLOG (formulary)] 3 unit SQ AC-TID  each 12/14/22 

[Rx]


Insulin Detemir (Levemir) [Levemir] 17 unit SQ HS  each 12/14/22 [Rx]


Lidocaine 5% Patch [Lidoderm 5% Patch] 2 patch TOPICAL DAILY@1500  patch 

12/14/22 [Rx]


Metoprolol Tartrate [Lopressor] 25 mg PO BID  tab 12/14/22 [Rx]


Morphine Sulfate [Ms Contin] 30 mg PO TID #3 tab 12/14/22 [Rx]


Pregabalin [Lyrica] 200 mg PO BID #2 cap 12/14/22 [Rx]


Sennosides [Senokot] 8.6 mg PO BID PRN  tab 12/14/22 [Rx]


metroNIDAZOLE [Flagyl] 500 mg PO TID  tab 12/14/22 [Rx]


predniSONE 0 mg PO AS DIRECTED 16 Days #38 tab 12/14/22 [Rx]








Follow up Appointment(s)/Referral(s): 


Jesica Arora MD [Primary Care Provider] - 12/20/22 1:45 pm


Antwon Wallace MD [STAFF PHYSICIAN] - 1 Week


Jose Ortiz MD [STAFF PHYSICIAN] - 1 Week


Joe Joya MD [STAFF PHYSICIAN] - 1 Week


[facility] Visiting Nurse, [NON-STAFF] - 


Nasreen Eller MD [STAFF PHYSICIAN] - 12/23/22 10:45 am


Ambulatory/Diagnostic Orders: 


Basic Metabolic Panel [LAB.AMB] Time Frame: 3 Days, Location: None Selected


Complete Blood Count w/diff [LAB.AMB] Time Frame: 3 Days, Location: None 

Selected


Activity/Diet/Wound Care/Special Instructions: 


Local wound care santyl to right plantar foot. 





Follow up with Dr. Ortiz in the Wound care Clinic please call [facility] 

care at [phone] to make follow up appointment 








Patient is being discharged with PICC line


Continue IV cefepime 2 grams every 8 hours for 6 weeks


Continue oral flagyl 500 mg Q8h for 6 weeks.





Discharge Disposition: TRANSFER TO SNF/ECF

## 2022-12-19 NOTE — P.PN
Subjective


Progress Note Date: 12/14/22


Principal diagnosis: 


Right diabetic foot infection





Patient is a 62-year-old  male with a past medical history taken for 

diabetes mellitus presenting to the hospital 4 days ago on 12/3/2022 for 

evaluation of increasing shortness of breath and lower extremity swelling ladan

ent also have wound on the plantar aspect of the right foot which has been there

for for more than a week before presentation to the hospital patient did have 

local cultures obtained growing MSSA and anaerobes.  Patient is status post 

bedside debridement of his right foot plantar wound by vascular surgery mention 

wound was extending on into the subcutaneous tissue no involvement of the bone, 

procedure completed on 12/08/2022


On today's evaluation that is 12/14/2022, the patient continues to be afebrile, 

the patient is breathing slightly comfortably on a 2 L nasal cannula, the 

patient denies  chest pain, the patient denies any worsening cough or sputum 

production, no nausea no vomiting no abdominal pain and diarrhea has slowed 

down, denies any pain to the right foot wound area





Objective





- Vital Signs


Vital signs: 


                                   Vital Signs











Temp  97.5 F L  12/14/22 07:57


 


Pulse  82   12/14/22 09:13


 


Resp  18   12/14/22 09:13


 


BP  105/62   12/14/22 07:57


 


Pulse Ox  94 L  12/14/22 09:03


 


FiO2      








                                 Intake & Output











 12/13/22 12/14/22 12/14/22





 18:59 06:59 18:59


 


Intake Total 476  


 


Output Total 400 875 600


 


Balance 76 -875 -600


 


Weight 104 kg 104 kg 


 


Intake:   


 


  Oral 476  


 


Output:   


 


  Urine 400 875 600


 


Other:   


 


  Voiding Method Urinal Urinal Urinal





 Diaper Diaper Diaper


 


  # Voids  1 


 


  # Bowel Movements 1  














- Exam


GENERAL DESCRIPTION: An elderly male lying in bed in no distress





RESPIRATORY SYSTEM: Unlabored breathing , decreased breath sounds at bases





HEART: S1 S2 regular rate and rhythm ,





ABDOMEN: Soft , no tenderness





EXTREMITIES: Right foot plantar wound is currently dressed, no drainage on the 

dressing








- Labs


CBC & Chem 7: 


                                 12/14/22 07:24





                                 12/14/22 07:24


Labs: 


                  Abnormal Lab Results - Last 24 Hours (Table)











  12/13/22 12/13/22 12/14/22 Range/Units





  16:17 20:09 06:11 


 


WBC     (3.8-10.6)  k/uL


 


RBC     (4.30-5.90)  m/uL


 


Hgb     (13.0-17.5)  gm/dL


 


Hct     (39.0-53.0)  %


 


MCH     (25.0-35.0)  pg


 


MCHC     (31.0-37.0)  g/dL


 


RDW     (11.5-15.5)  %


 


Plt Count     (150-450)  k/uL


 


Neutrophils #     (1.3-7.7)  k/uL


 


ESR     (0-15)  mm/hr


 


Sodium     (137-145)  mmol/L


 


Chloride     ()  mmol/L


 


Carbon Dioxide     (22-30)  mmol/L


 


BUN     (9-20)  mg/dL


 


Creatinine     (0.66-1.25)  mg/dL


 


POC Glucose (mg/dL)  160 H  190 H  116 H  ()  mg/dL


 


C-Reactive Protein     (<1.0)  mg/dL














  12/14/22 12/14/22 Range/Units





  07:24 07:24 


 


WBC  16.1 H   (3.8-10.6)  k/uL


 


RBC  4.04 L   (4.30-5.90)  m/uL


 


Hgb  9.7 L   (13.0-17.5)  gm/dL


 


Hct  34.1 L   (39.0-53.0)  %


 


MCH  24.1 L   (25.0-35.0)  pg


 


MCHC  28.6 L   (31.0-37.0)  g/dL


 


RDW  16.4 H   (11.5-15.5)  %


 


Plt Count  544 H   (150-450)  k/uL


 


Neutrophils #  14.2 H   (1.3-7.7)  k/uL


 


ESR  85 H   (0-15)  mm/hr


 


Sodium   135 L  (137-145)  mmol/L


 


Chloride   91 L  ()  mmol/L


 


Carbon Dioxide   40 H  (22-30)  mmol/L


 


BUN   23 H  (9-20)  mg/dL


 


Creatinine   0.48 L  (0.66-1.25)  mg/dL


 


POC Glucose (mg/dL)    ()  mg/dL


 


C-Reactive Protein   15.2 H  (<1.0)  mg/dL














Assessment and Plan


(1) Diabetic foot infection


Status: Acute   Code(s): E11.628 - TYPE 2 DIABETES MELLITUS WITH OTHER SKIN 

COMPLICATIONS; L08.9 - LOCAL INFECTION OF THE SKIN AND SUBCUTANEOUS TISSUE, UNSP

  SNOMED Code(s): 033520375


   





(2) Wound of foot


Status: Acute   Code(s): S91.309A - UNSPECIFIED OPEN WOUND, UNSPECIFIED FOOT, 

INITIAL ENCOUNTER   SNOMED Code(s): 847205275


   


Plan: 


1patient with right diabetic foot wound with secondary infection cultures are 

growing MSSA and anaerobes patient did have a wound with slough tissue and 

concern for underlying deep infection.


2patient is status post vascular surgery evaluation and debridement of the 

right foot plantar wound along with deep culture growing MSSA and Enterobacter 

along with anaerobes


3patient bone scan was suspicious for osteomyelitis , patient to continue with 

cefepime and Flagyl 6 weeks with weekly monitoring of CBC CRP and a sed rate 

and close outpatient follow-up


Time with Patient: Less than 30